# Patient Record
Sex: FEMALE | Race: WHITE | NOT HISPANIC OR LATINO | Employment: FULL TIME | URBAN - METROPOLITAN AREA
[De-identification: names, ages, dates, MRNs, and addresses within clinical notes are randomized per-mention and may not be internally consistent; named-entity substitution may affect disease eponyms.]

---

## 2017-01-27 ENCOUNTER — OFFICE VISIT (OUTPATIENT)
Dept: MEDICAL GROUP | Facility: MEDICAL CENTER | Age: 62
End: 2017-01-27
Payer: COMMERCIAL

## 2017-01-27 VITALS
BODY MASS INDEX: 31.77 KG/M2 | OXYGEN SATURATION: 95 % | TEMPERATURE: 97.3 F | DIASTOLIC BLOOD PRESSURE: 102 MMHG | HEART RATE: 80 BPM | HEIGHT: 66 IN | RESPIRATION RATE: 12 BRPM | WEIGHT: 197.7 LBS | SYSTOLIC BLOOD PRESSURE: 138 MMHG

## 2017-01-27 DIAGNOSIS — E78.49 OTHER HYPERLIPIDEMIA: ICD-10-CM

## 2017-01-27 DIAGNOSIS — Z01.419 ENCOUNTER FOR GYNECOLOGICAL EXAMINATION: ICD-10-CM

## 2017-01-27 DIAGNOSIS — I10 ESSENTIAL HYPERTENSION: ICD-10-CM

## 2017-01-27 DIAGNOSIS — J06.9 ACUTE URI: ICD-10-CM

## 2017-01-27 DIAGNOSIS — R73.9 HYPERGLYCEMIA: ICD-10-CM

## 2017-01-27 DIAGNOSIS — Z23 NEED FOR SHINGLES VACCINE: ICD-10-CM

## 2017-01-27 DIAGNOSIS — M85.80 OSTEOPENIA: ICD-10-CM

## 2017-01-27 DIAGNOSIS — Z23 NEED FOR INFLUENZA VACCINATION: ICD-10-CM

## 2017-01-27 DIAGNOSIS — D22.9 ATYPICAL NEVI: ICD-10-CM

## 2017-01-27 DIAGNOSIS — Z00.00 PREVENTATIVE HEALTH CARE: ICD-10-CM

## 2017-01-27 DIAGNOSIS — M79.671 RIGHT FOOT PAIN: ICD-10-CM

## 2017-01-27 DIAGNOSIS — E03.1 CONGENITAL HYPOTHYROIDISM: ICD-10-CM

## 2017-01-27 PROCEDURE — 90471 IMMUNIZATION ADMIN: CPT | Performed by: NURSE PRACTITIONER

## 2017-01-27 PROCEDURE — 99396 PREV VISIT EST AGE 40-64: CPT | Mod: 25 | Performed by: NURSE PRACTITIONER

## 2017-01-27 PROCEDURE — 90686 IIV4 VACC NO PRSV 0.5 ML IM: CPT | Performed by: NURSE PRACTITIONER

## 2017-01-27 PROCEDURE — 90736 HZV VACCINE LIVE SUBQ: CPT | Performed by: NURSE PRACTITIONER

## 2017-01-27 PROCEDURE — 90472 IMMUNIZATION ADMIN EACH ADD: CPT | Performed by: NURSE PRACTITIONER

## 2017-01-27 RX ORDER — PROMETHAZINE HYDROCHLORIDE AND CODEINE PHOSPHATE 6.25; 1 MG/5ML; MG/5ML
5-10 SYRUP ORAL NIGHTLY PRN
Qty: 240 ML | Refills: 0 | Status: SHIPPED
Start: 2017-01-27 | End: 2017-09-25

## 2017-01-27 RX ORDER — LISINOPRIL 5 MG/1
5 TABLET ORAL DAILY
Qty: 90 TAB | Refills: 3 | Status: SHIPPED | OUTPATIENT
Start: 2017-01-27 | End: 2018-03-01 | Stop reason: SDUPTHER

## 2017-01-27 RX ORDER — LEVOTHYROXINE SODIUM 112 UG/1
TABLET ORAL
Qty: 90 TAB | Refills: 3 | Status: SHIPPED | OUTPATIENT
Start: 2017-01-27 | End: 2018-04-27 | Stop reason: SDUPTHER

## 2017-01-27 RX ORDER — ROSUVASTATIN CALCIUM 40 MG/1
40 TABLET, COATED ORAL DAILY
Qty: 90 TAB | Refills: 3 | Status: SHIPPED | OUTPATIENT
Start: 2017-01-27 | End: 2018-02-09 | Stop reason: SDUPTHER

## 2017-01-27 ASSESSMENT — PATIENT HEALTH QUESTIONNAIRE - PHQ9: CLINICAL INTERPRETATION OF PHQ2 SCORE: 0

## 2017-01-27 NOTE — PROGRESS NOTES
Chief Complaint   Patient presents with   • Annual Exam     physical & possible pap       HPI:  Christa is a 61 y.o.  female who presents for annual exam. She is a few concerns such as new onset right foot pain for the past 6 months without trauma. She like an x-ray which was suggested by her chiropractor. 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 Her pain is typically with a lot of use of her right foot.  She has osteopenia, borderline osteoporosis with her last bone density showing a T score of -2.4 in her spine area Has had 2 prolia injections - last was Dec 2016 - pt has to pay a fortune for this and would like to discuss other options. She is allergic to bisphosphonates, she had temporary paralysis in her right leg after taking an oral bisphosphonate.   Her hypertension sent well controlled and she needs refills of medication, she states her blood pressure is always high when she is in here. Blood pressures been around 130/80 at home. She is due for blood work. She reports that she's been exercising and avoiding white carbohydrates, she does have type 2 diabetes and is taking metformin. She has been refusing to check her blood sugars at home.  Regarding her health maintenance:   Last pap: 2 years ago, she had a partial hysterectomy multiple years ago and cannot recall if she has a cervix  Abnormal Pap hx: No   Periods: None, hysterectomy patient  Last Mammo: Done 2 months ago and normal  Last colonoscopy: This is up-to-date.  Bone density test: She is due for this  Last Lab: due for follow up   Last Td:current   Influenza vaccination: She is due for this  Pneumococcal vaccination:not applicable   Zostavax: She is due for this  Hx STD''s: no   Regular exercise: yes      meds:   Current Outpatient Prescriptions   Medication Sig Dispense Refill   • metformin (GLUCOPHAGE) 500 MG Tab TAKE ONE TABLET BY MOUTH ONCE DAILY AT BEDTIME WITH DINNER. OK TO TAKE 0.5 TAB IF YOU HAVE STOMACH ISSUES 90 Tab  0   • ciprofloxacin (CIPRO) 500 MG Tab TAKE ONE TABLET BY MOUTH TWICE DAILY 20 Tab 0   • hydrocortisone 2.5 % Cream topical cream APPLY CREAM TOPICALLY TWICE DAILY AS NEEDED 1 Tube 0   • lisinopril (PRINIVIL) 5 MG Tab Take 1 Tab by mouth every day. 30 Tab 11   • cyclobenzaprine (FLEXERIL) 10 MG Tab Take 1 Tab by mouth 3 times a day as needed. 30 Tab 0   • albuterol (VENTOLIN OR PROVENTIL) 108 (90 BASE) MCG/ACT Aero Soln inhalation aerosol Inhale 2 Puffs by mouth every 6 hours as needed for Shortness of Breath. 8.5 g 3   • levothyroxine (SYNTHROID) 112 MCG Tab TAKE ONE TABLET BY MOUTH ONCE DAILY 90 Tab 3   • rosuvastatin (CRESTOR) 40 MG tablet Take 1 Tab by mouth every day. 30 Tab 11   • estradiol (ESTRACE) 0.5 MG tablet Take 1 Tab by mouth every day. 90 Tab 3   • hydrocortisone rectal (PROCTOCREAM-HC) 2.5 % CREA Apply bid prn 1 Tube 3   • aspirin buffered (ASCRIPTIN) 325 MG TABS Take 2 Tabs by mouth every four hours as needed.       No current facility-administered medications for this visit.       Allergies: No Known Allergies    family:   Family History   Problem Relation Age of Onset   • Heart Disease Mother    • Hypertension Mother    • Hyperlipidemia Sister    • Hyperlipidemia Brother    • Heart Disease Brother    • Heart Disease Maternal Grandmother    • Hyperlipidemia Maternal Grandmother        social hx:   Social History     Social History   • Marital Status: Single     Spouse Name: N/A   • Number of Children: N/A   • Years of Education: N/A     Occupational History   • Not on file.     Social History Main Topics   • Smoking status: Never Smoker    • Smokeless tobacco: Never Used   • Alcohol Use: No   • Drug Use: No   • Sexual Activity: Not on file      Comment: , 4 children, teacher @ [employer]     Other Topics Concern   • Not on file     Social History Narrative       ROS:  No fever, chills, sweats.   No polydipsia, polyuria, temperature intolerance, significant weight changes   No visual changes,  "blurred vision.  No chest pain, palpitations, peripheral swelling   No chronic cough, shortness of breath, dyspnea with exertion.   No dysphagia, odynophagia, black or bloody stools.   No abdominal pain, nausea, persistent diarrhea, constipation   No dysuria, hematuria, incontinence. Denies nocturia  No rash, pruritis, pigment changes.   No focal weakness, syncope, headache, confusion, persistent numbness.   All other systems are reviewed and negative.    PHYSICAL EXAMINATION:  Blood pressure 138/102, pulse 80, temperature 36.3 °C (97.3 °F), resp. rate 12, height 1.676 m (5' 6\"), weight 89.676 kg (197 lb 11.2 oz), SpO2 95 %.  General appearance: Overweight female, healthy, well developed, well nourished  Psych: alert, no distress, cooperative  Eyes: EOM's normal, pupils equal, round, reactive to light  ENT: Ears: external ears normal to inspection and palpation, TM's clear, Nose/Sinuses: nose shows no deformity, asymmetry, or inflammation  Neck: no asymmetry, masses, or scars, no adenopathy, thyroid normal to palpation  Lungs:chest symmetric with normal A/P diameter, no chest deformities noted, normal respiratory rate and rhythm  Cardiovascular:regular rate and rhythm, S1 normal  Breasts: normal in size and symmetry, skin normal, physiologic fibronodularity  Abdomen: umbilicus normal, no masses palpable, no organomegaly  Musculoskeletal:ROM of all joints is normal, no evidence of joint instability. Right foot: She does have some tenderness to her fifth distal metatarsal and in between her first and second metatarsal area  Lymphatic: None significantly enlarged  Skin: no rash, no edema.  5 x 7 mm raised irritated / scabbed nevi to right lower cheek just lateral ear.  Neuro: mental status intact, cranial nerves 2-12 intact  Pelvic: external genitalia normal, cervix and uterus are surgically absent, adnexa without masses or tenderness, vaginal mucosa normal      ASSESSMENT/PLAN:  1.annual physical exam: HCM:  Pap and " breast exams done.  BSE technique reviewed and patient encouraged to perform self-exam monthly.   Encourage monthly self breast exam  Encourage daily exercise for at least 30 minutes  Mammogram and colonoscopy are up-to-date  Recommend 1500 mg Calcium with 600 units vit d daily.    Follow up one year for annual PAP  Recommend CBC, CMP, TSH, LP, A1c- fasting and a follow-up here to review labs  2. Osteopenia: Consider Evista. Recheck DEXA prior to initiating any other therapy. Discussed the importance of weightbearing exercise and avoidance of caffeine.  3.  Right foot pain: Check x-ray, suspect arthritis.  4.  Hypertension: Elevated here. Reports that home monitoring has been good. Follow-up for numbers consistently greater than 140/90

## 2017-01-27 NOTE — MR AVS SNAPSHOT
"        Christa Ramirez   2017 3:20 PM   Office Visit   MRN: [id number]    Department:  [address]   Dept Phone:  [phone]    Description:  Female : 1955   Provider:  SABINO Mchugh           Reason for Visit     Annual Exam physical & possible pap      Allergies as of 2017     Allergen Noted Reactions    Boniva [Ibandronic Acid] 2016       \"right side numbness\"      You were diagnosed with     Encounter for gynecological examination   [5352367]       Atypical nevi   [366526]       Osteopenia   [653120]       Need for shingles vaccine   [332456]       Right foot pain   [973465]       Essential hypertension   [0197140]       Other hyperlipidemia   [1789072]       Congenital hypothyroidism   [243.ICD-9-CM]       Hyperglycemia   [458678]       Preventative health care   [428315]       Acute URI   [026965]       Need for influenza vaccination   [947584]         Vital Signs     Blood Pressure Pulse Temperature Respirations Height Weight    138/102 mmHg 80 36.3 °C (97.3 °F) 12 1.676 m (5' 6\") 89.676 kg (197 lb 11.2 oz)    Body Mass Index Oxygen Saturation Smoking Status             31.92 kg/m2 95% Never Smoker          Basic Information     Date Of Birth Sex Race Ethnicity Preferred Language    1955 Female Unknown Unknown English      Problem List              ICD-10-CM Priority Class Noted - Resolved    Hypertension I10   Unknown - Present    Hyperlipidemia E78.5   Unknown - Present    Frequent UTI N39.0   Unknown - Present    Hemorrhoid K64.9   Unknown - Present    Hypothyroid E03.9   2012 - Present    Hyperglycemia R73.9   2012 - Present    Obesity E66.9   2012 - Present    Thyroid nodule E04.1   2013 - Present    Osteopenia M85.80   2016 - Present      Health Maintenance        Date Due Completion Dates    IMM HEP B VACCINE (1 of 3 - Primary Series) 1955 ---    DIABETES MONOFILAMENT / LE EXAM 1955 ---    RETINAL " SCREENING 5/29/1974 ---    URINE ACR / MICROALBUMIN 5/29/1974 ---    IMM ZOSTER VACCINE 5/29/2016 ---    IMM INFLUENZA (1) 9/1/2016 12/23/2015, 12/22/2014, 11/26/2013, 10/9/2012    A1C SCREENING 10/29/2016 4/29/2016, 1/15/2016    FASTING LIPID PROFILE 1/15/2017 1/15/2016, 12/29/2014, 12/5/2013, 11/12/2012, 8/22/2011, 10/17/2009    SERUM CREATININE 1/15/2017 1/15/2016, 12/29/2014, 12/5/2013, 11/12/2012, 8/22/2011    COLONOSCOPY 9/11/2017 9/11/2007 (N/S)    Override on 9/11/2007: (N/S)    MAMMOGRAM 11/30/2017 11/30/2016, 1/15/2015, 1/9/2015, 12/20/2013, 12/14/2012, 11/23/2011, 10/1/2010, 7/28/2009, 7/28/2009, 7/10/2008, 7/10/2008, 6/26/2007, 6/26/2007, 4/26/2006    PAP SMEAR 12/23/2018 12/23/2015, 12/22/2014, 10/9/2012, 7/27/2009    IMM DTaP/Tdap/Td Vaccine (2 - Td) 11/26/2023 11/26/2013            Current Immunizations     Influenza TIV (IM) 11/26/2013, 10/9/2012 12:58 PM    Influenza Vaccine Quad Inj (Pf) 1/27/2017  4:02 PM, 12/23/2015 10:28 AM    Influenza Vaccine Quad Inj (Preserved) 12/22/2014  9:07 AM    SHINGLES VACCINE 1/27/2017  4:01 PM    Tdap Vaccine 11/26/2013      Below and/or attached are the medications your provider expects you to take. Review all of your home medications and newly ordered medications with your provider and/or pharmacist. Follow medication instructions as directed by your provider and/or pharmacist. Please keep your medication list with you and share with your provider. Update the information when medications are discontinued, doses are changed, or new medications (including over-the-counter products) are added; and carry medication information at all times in the event of emergency situations     Allergies:  BONIVA - (reactions not documented)               Medications  Valid as of: January 27, 2017 -  4:48 PM    Generic Name Brand Name Tablet Size Instructions for use    Albuterol Sulfate (Aero Soln) albuterol 108 (90 BASE) MCG/ACT Inhale 2 Puffs by mouth every 6 hours as needed for  Shortness of Breath.        Aspirin Buf(IrUoz-WrEps-BmIml) (Tab) ASCRIPTIN 325 MG Take 2 Tabs by mouth every four hours as needed.        Ciprofloxacin HCl (Tab) CIPRO 500 MG TAKE ONE TABLET BY MOUTH TWICE DAILY        Cyclobenzaprine HCl (Tab) FLEXERIL 10 MG Take 1 Tab by mouth 3 times a day as needed.        Estradiol (Tab) ESTRACE 0.5 MG Take 1 Tab by mouth every day.        Hydrocortisone (Cream) PROCTOZONE HC 2.5 % Apply bid prn        Hydrocortisone (Cream) hydrocortisone 2.5 % APPLY CREAM TOPICALLY TWICE DAILY AS NEEDED        Levothyroxine Sodium (Tab) SYNTHROID 112 MCG TAKE ONE TABLET BY MOUTH ONCE DAILY        Lisinopril (Tab) PRINIVIL 5 MG Take 1 Tab by mouth every day.        MetFORMIN HCl (Tab) GLUCOPHAGE 500 MG TAKE ONE TABLET BY MOUTH ONCE DAILY AT BEDTIME WITH DINNER. OK TO TAKE 0.5 TAB IF YOU HAVE STOMACH ISSUES        Promethazine-Codeine (Syrup) PHENERGAN-CODEINE 6.25-10 MG/5ML Take 5-10 mL by mouth at bedtime as needed for Cough.        Rosuvastatin Calcium (Tab) CRESTOR 40 MG Take 1 Tab by mouth every day.        .                 Medicines prescribed today were sent to:     [facility] [address] [place] [address] (), [place] - [id number] () [place] [address]    Phone: [phone] Fax: [phone]    Open 24 Hours?: No      Medication refill instructions:       If your prescription bottle indicates you have medication refills left, it is not necessary to call your provider’s office. Please contact your pharmacy and they will refill your medication.    If your prescription bottle indicates you do not have any refills left, you may request refills at any time through one of the following ways: The online [employer] system (except Urgent Care), by calling your provider’s office, or by asking your pharmacy to contact your provider’s office with a refill request. Medication refills are processed only during regular business hours and may not be available until the next business day.  Your provider may request additional information or to have a follow-up visit with you prior to refilling your medication.   *Please Note: Medication refills are assigned a new Rx number when refilled electronically. Your pharmacy may indicate that no refills were authorized even though a new prescription for the same medication is available at the pharmacy. Please request the medicine by name with the pharmacy before contacting your provider for a refill.        Your To Do List     Future Labs/Procedures Complete By Expires    CBC WITH DIFFERENTIAL  As directed 1/28/2018    COMP METABOLIC PANEL  As directed 1/28/2018    DS-BONE DENSITY STUDY (DEXA)  As directed 1/27/2018    DX-FOOT-COMPLETE 3+ RIGHT  As directed 7/30/2017    FREE THYROXINE  As directed 7/28/2017    HEMOGLOBIN A1C  As directed 1/28/2018    LIPID PROFILE  As directed 1/28/2018    TSH  As directed 1/28/2018      Referral     A referral request has been sent to our patient care coordination department. Please allow 3-5 business days for us to process this request and contact you either by phone or mail. If you do not hear from us by the 5th business day, please call us at [phone].           [facility] Access Code: Activation code not generated  Current [facility] Status: Active

## 2017-02-04 ENCOUNTER — HOSPITAL ENCOUNTER (OUTPATIENT)
Dept: LAB | Facility: MEDICAL CENTER | Age: 62
End: 2017-02-04
Attending: NURSE PRACTITIONER
Payer: COMMERCIAL

## 2017-02-04 DIAGNOSIS — Z00.00 PREVENTATIVE HEALTH CARE: ICD-10-CM

## 2017-02-04 LAB
ALBUMIN SERPL BCP-MCNC: 4.4 G/DL (ref 3.2–4.9)
ALBUMIN/GLOB SERPL: 1.6 G/DL
ALP SERPL-CCNC: 56 U/L (ref 30–99)
ALT SERPL-CCNC: 24 U/L (ref 2–50)
ANION GAP SERPL CALC-SCNC: 8 MMOL/L (ref 0–11.9)
AST SERPL-CCNC: 26 U/L (ref 12–45)
BASOPHILS # BLD AUTO: 0.04 K/UL (ref 0–0.12)
BASOPHILS NFR BLD AUTO: 0.7 % (ref 0–1.8)
BILIRUB SERPL-MCNC: 0.4 MG/DL (ref 0.1–1.5)
BUN SERPL-MCNC: 17 MG/DL (ref 8–22)
CALCIUM SERPL-MCNC: 9.8 MG/DL (ref 8.5–10.5)
CHLORIDE SERPL-SCNC: 105 MMOL/L (ref 96–112)
CHOLEST SERPL-MCNC: 154 MG/DL (ref 100–199)
CO2 SERPL-SCNC: 26 MMOL/L (ref 20–33)
CREAT SERPL-MCNC: 0.67 MG/DL (ref 0.5–1.4)
EOSINOPHIL # BLD: 0.18 K/UL (ref 0–0.51)
EOSINOPHIL NFR BLD AUTO: 3 % (ref 0–6.9)
ERYTHROCYTE [DISTWIDTH] IN BLOOD BY AUTOMATED COUNT: 48.8 FL (ref 35.9–50)
EST. AVERAGE GLUCOSE BLD GHB EST-MCNC: 131 MG/DL
GLOBULIN SER CALC-MCNC: 2.8 G/DL (ref 1.9–3.5)
GLUCOSE SERPL-MCNC: 100 MG/DL (ref 65–99)
HBA1C MFR BLD: 6.2 % (ref 0–5.6)
HCT VFR BLD AUTO: 46 % (ref 37–47)
HDLC SERPL-MCNC: 48 MG/DL
HGB BLD-MCNC: 14.6 G/DL (ref 12–16)
IMM GRANULOCYTES # BLD AUTO: 0.01 K/UL (ref 0–0.11)
IMM GRANULOCYTES NFR BLD AUTO: 0.2 % (ref 0–0.9)
LDLC SERPL CALC-MCNC: 86 MG/DL
LYMPHOCYTES # BLD: 1.56 K/UL (ref 1–4.8)
LYMPHOCYTES NFR BLD AUTO: 26 % (ref 22–41)
MCH RBC QN AUTO: 27.7 PG (ref 27–33)
MCHC RBC AUTO-ENTMCNC: 31.7 G/DL (ref 33.6–35)
MCV RBC AUTO: 87.1 FL (ref 81.4–97.8)
MONOCYTES # BLD: 0.41 K/UL (ref 0–0.85)
MONOCYTES NFR BLD AUTO: 6.8 % (ref 0–13.4)
NEUTROPHILS # BLD: 3.81 K/UL (ref 2–7.15)
NEUTROPHILS NFR BLD AUTO: 63.3 % (ref 44–72)
NRBC # BLD AUTO: 0 K/UL
NRBC BLD-RTO: 0 /100 WBC
PLATELET # BLD AUTO: 335 K/UL (ref 164–446)
PMV BLD AUTO: 8.9 FL (ref 9–12.9)
POTASSIUM SERPL-SCNC: 4.3 MMOL/L (ref 3.6–5.5)
PROT SERPL-MCNC: 7.2 G/DL (ref 6–8.2)
RBC # BLD AUTO: 5.28 M/UL (ref 4.2–5.4)
SODIUM SERPL-SCNC: 139 MMOL/L (ref 135–145)
T4 FREE SERPL-MCNC: 1.01 NG/DL (ref 0.53–1.43)
TRIGL SERPL-MCNC: 101 MG/DL (ref 0–149)
TSH SERPL DL<=0.005 MIU/L-ACNC: 0.7 UIU/ML (ref 0.3–3.7)
WBC # BLD AUTO: 6 K/UL (ref 4.8–10.8)

## 2017-02-04 PROCEDURE — 80061 LIPID PANEL: CPT

## 2017-02-04 PROCEDURE — 84443 ASSAY THYROID STIM HORMONE: CPT

## 2017-02-04 PROCEDURE — 84439 ASSAY OF FREE THYROXINE: CPT

## 2017-02-04 PROCEDURE — 80053 COMPREHEN METABOLIC PANEL: CPT

## 2017-02-04 PROCEDURE — 36415 COLL VENOUS BLD VENIPUNCTURE: CPT

## 2017-02-04 PROCEDURE — 85025 COMPLETE CBC W/AUTO DIFF WBC: CPT

## 2017-02-04 PROCEDURE — 83036 HEMOGLOBIN GLYCOSYLATED A1C: CPT

## 2017-02-13 DIAGNOSIS — J02.9 ACUTE PHARYNGITIS, UNSPECIFIED ETIOLOGY: ICD-10-CM

## 2017-02-13 RX ORDER — AZITHROMYCIN 250 MG/1
TABLET, FILM COATED ORAL
Qty: 6 TAB | Refills: 0 | Status: SHIPPED | OUTPATIENT
Start: 2017-02-13 | End: 2017-09-25

## 2017-02-16 ENCOUNTER — HOSPITAL ENCOUNTER (OUTPATIENT)
Dept: RADIOLOGY | Facility: MEDICAL CENTER | Age: 62
End: 2017-02-16
Attending: NURSE PRACTITIONER
Payer: COMMERCIAL

## 2017-02-16 DIAGNOSIS — M79.671 RIGHT FOOT PAIN: ICD-10-CM

## 2017-02-16 DIAGNOSIS — M85.80 OSTEOPENIA: ICD-10-CM

## 2017-02-16 PROCEDURE — 77080 DXA BONE DENSITY AXIAL: CPT

## 2017-02-16 PROCEDURE — 73630 X-RAY EXAM OF FOOT: CPT | Mod: RT

## 2017-02-17 DIAGNOSIS — M85.80 OSTEOPENIA: ICD-10-CM

## 2017-02-17 DIAGNOSIS — M79.671 RIGHT FOOT PAIN: ICD-10-CM

## 2017-02-17 RX ORDER — RALOXIFENE HYDROCHLORIDE 60 MG/1
60 TABLET, FILM COATED ORAL DAILY
Qty: 30 TAB | Refills: 11 | Status: SHIPPED | OUTPATIENT
Start: 2017-02-17 | End: 2017-07-11 | Stop reason: SDUPTHER

## 2017-02-28 ENCOUNTER — OFFICE VISIT (OUTPATIENT)
Dept: PLASTIC SURGERY | Facility: IMAGING CENTER | Age: 62
End: 2017-02-28
Payer: COMMERCIAL

## 2017-02-28 ENCOUNTER — HOSPITAL ENCOUNTER (OUTPATIENT)
Facility: MEDICAL CENTER | Age: 62
End: 2017-02-28
Attending: NURSE PRACTITIONER
Payer: COMMERCIAL

## 2017-02-28 DIAGNOSIS — D48.5 NEOPLASM OF UNCERTAIN BEHAVIOR OF SKIN: ICD-10-CM

## 2017-02-28 LAB — PATHOLOGY CONSULT NOTE: NORMAL

## 2017-02-28 PROCEDURE — 99201 PR OFFICE/OUTPT VISIT,NEW,LEVL I: CPT | Mod: 25 | Performed by: NURSE PRACTITIONER

## 2017-02-28 PROCEDURE — 11100 PR BIOPSY OF SKIN LESION: CPT | Performed by: NURSE PRACTITIONER

## 2017-02-28 PROCEDURE — 88305 TISSUE EXAM BY PATHOLOGIST: CPT

## 2017-02-28 NOTE — ASSESSMENT & PLAN NOTE
She has had a skin lesion on the right side of her jaw since December . She states it won't  stop bleeding, she has to keep a bandage on it all times or it will bleed on her clothing.

## 2017-02-28 NOTE — MR AVS SNAPSHOT
"        Christa Ramirez   2017 3:30 PM   Office Visit   MRN: [id number]    Department:  [facility]   Dept Phone:  [phone]    Description:  Female : 1955   Provider:  SABINO Galvan           Reason for Visit     Skin Lesion           Allergies as of 2017     Allergen Noted Reactions    Boniva [Ibandronic Acid] 2016       \"right side numbness\"      You were diagnosed with     Neoplasm of uncertain behavior of skin   [238.2.ICD-9-CM]         Vital Signs     Smoking Status                   Never Smoker            Basic Information     Date Of Birth Sex Race Ethnicity Preferred Language    1955 Female Unknown Non- English      Problem List              ICD-10-CM Priority Class Noted - Resolved    Hypertension I10   Unknown - Present    Hyperlipidemia E78.5   Unknown - Present    Frequent UTI N39.0   Unknown - Present    Hemorrhoid K64.9   Unknown - Present    Hypothyroid E03.9   2012 - Present    Hyperglycemia R73.9   2012 - Present    Obesity E66.9   2012 - Present    Thyroid nodule E04.1   2013 - Present    Osteopenia M85.80   2016 - Present    Neoplasm of uncertain behavior of skin D48.5   2017 - Present      Health Maintenance        Date Due Completion Dates    IMM HEP B VACCINE (1 of 3 - Primary Series) 1955 ---    DIABETES MONOFILAMENT / LE EXAM 1955 ---    RETINAL SCREENING 1974 ---    URINE ACR / MICROALBUMIN 1974 ---    A1C SCREENING 2017, 2016, 1/15/2016    COLONOSCOPY 2017 (N/S)    Override on 2007: (N/S)    MAMMOGRAM 2017, 1/15/2015, 2015, 2013, 2012, 2011, 10/1/2010, 2009, 2009, 7/10/2008, 7/10/2008, 2007, 2007, 2006    FASTING LIPID PROFILE 2018, 1/15/2016, 2014, 2013, 2012, 2011, 10/17/2009    SERUM CREATININE 2018, 1/15/2016, " 12/29/2014, 12/5/2013, 11/12/2012, 8/22/2011    PAP SMEAR 12/23/2018 12/23/2015, 12/22/2014, 10/9/2012, 7/27/2009    IMM DTaP/Tdap/Td Vaccine (2 - Td) 11/26/2023 11/26/2013            Current Immunizations     Influenza TIV (IM) 11/26/2013, 10/9/2012 12:58 PM    Influenza Vaccine Quad Inj (Pf) 1/27/2017  4:02 PM, 12/23/2015 10:28 AM    Influenza Vaccine Quad Inj (Preserved) 12/22/2014  9:07 AM    SHINGLES VACCINE 1/27/2017  4:01 PM    Tdap Vaccine 11/26/2013      Below and/or attached are the medications your provider expects you to take. Review all of your home medications and newly ordered medications with your provider and/or pharmacist. Follow medication instructions as directed by your provider and/or pharmacist. Please keep your medication list with you and share with your provider. Update the information when medications are discontinued, doses are changed, or new medications (including over-the-counter products) are added; and carry medication information at all times in the event of emergency situations     Allergies:  BONIVA - (reactions not documented)               Medications  Valid as of: March 07, 2017 -  3:01 PM    Generic Name Brand Name Tablet Size Instructions for use    Albuterol Sulfate (Aero Soln) albuterol 108 (90 BASE) MCG/ACT Inhale 2 Puffs by mouth every 6 hours as needed for Shortness of Breath.        Aspirin Buf(VvEjg-RiViv-QdVgh) (Tab) ASCRIPTIN 325 MG Take 2 Tabs by mouth every four hours as needed.        Azithromycin (Tab) ZITHROMAX 250  mg day one, 250 mg day 2-5        Ciprofloxacin HCl (Tab) CIPRO 500 MG TAKE ONE TABLET BY MOUTH TWICE DAILY        Cyclobenzaprine HCl (Tab) FLEXERIL 10 MG Take 1 Tab by mouth 3 times a day as needed.        Estradiol (Tab) ESTRACE 0.5 MG Take 1 Tab by mouth every day.        Hydrocortisone (Cream) PROCTOZONE HC 2.5 % Apply bid prn        Hydrocortisone (Cream) hydrocortisone 2.5 % APPLY CREAM TOPICALLY TO AFFECTED AREA(S) TWICE DAILY AS NEEDED         Levothyroxine Sodium (Tab) SYNTHROID 112 MCG TAKE ONE TABLET BY MOUTH ONCE DAILY        Lisinopril (Tab) PRINIVIL 5 MG Take 1 Tab by mouth every day.        MetFORMIN HCl (Tab) GLUCOPHAGE 500 MG TAKE ONE TABLET BY MOUTH ONCE DAILY AT BEDTIME WITH DINNER. OK TO TAKE 0.5 TAB IF YOU HAVE STOMACH ISSUES        Promethazine-Codeine (Syrup) PHENERGAN-CODEINE 6.25-10 MG/5ML Take 5-10 mL by mouth at bedtime as needed for Cough.        Raloxifene HCl (Tab) EVISTA 60 MG Take 1 Tab by mouth every day.        Rosuvastatin Calcium (Tab) CRESTOR 40 MG Take 1 Tab by mouth every day.        .                 Medicines prescribed today were sent to:     [facility] [address] [place] [address] (), [place] - [address]    [address] () [place] [address]    Phone: [phone] Fax: [phone]    Open 24 Hours?: No      Medication refill instructions:       If your prescription bottle indicates you have medication refills left, it is not necessary to call your provider’s office. Please contact your pharmacy and they will refill your medication.    If your prescription bottle indicates you do not have any refills left, you may request refills at any time through one of the following ways: The online [employer] system (except Urgent Care), by calling your provider’s office, or by asking your pharmacy to contact your provider’s office with a refill request. Medication refills are processed only during regular business hours and may not be available until the next business day. Your provider may request additional information or to have a follow-up visit with you prior to refilling your medication.   *Please Note: Medication refills are assigned a new Rx number when refilled electronically. Your pharmacy may indicate that no refills were authorized even though a new prescription for the same medication is available at the pharmacy. Please request the medicine by name with the pharmacy before contacting your provider for a  refill.           MyChart Access Code: Activation code not generated  Current [employer] Status: Active

## 2017-03-01 NOTE — PROGRESS NOTES
Chief Complaint   Patient presents with   • Skin Lesion         HISTORY OF THE PRESENT ILLNESS: Patient is a 61 y.o. Female patient of Aundrea Pat. This pleasant patient is here today regarding a skin lesion on the right side of her face at the jaw line. I      Neoplasm of uncertain behavior of skin  She has had a skin lesion on the right side of her jaw that has been bleeding since December . It was a round raised lesion prior to that, she scraped it and then it started bleeding. She states it won't  stop bleeding, she has to keep a bandage on it all times or it will bleed on her clothing.        Allergies: Boniva    Current Outpatient Prescriptions   Medication Sig Dispense Refill   • hydrocortisone 2.5 % Cream topical cream APPLY CREAM TOPICALLY TO AFFECTED AREA(S) TWICE DAILY AS NEEDED 28 g 0   • raloxifene (EVISTA) 60 MG Tab Take 1 Tab by mouth every day. 30 Tab 11   • azithromycin (ZITHROMAX) 250 MG Tab 500 mg day one, 250 mg day 2-5 6 Tab 0   • lisinopril (PRINIVIL) 5 MG Tab Take 1 Tab by mouth every day. 90 Tab 3   • rosuvastatin (CRESTOR) 40 MG tablet Take 1 Tab by mouth every day. 90 Tab 3   • levothyroxine (SYNTHROID) 112 MCG Tab TAKE ONE TABLET BY MOUTH ONCE DAILY 90 Tab 3   • promethazine-codeine (PHENERGAN-CODEINE) 6.25-10 MG/5ML Syrup Take 5-10 mL by mouth at bedtime as needed for Cough. 240 mL 0   • metformin (GLUCOPHAGE) 500 MG Tab TAKE ONE TABLET BY MOUTH ONCE DAILY AT BEDTIME WITH DINNER. OK TO TAKE 0.5 TAB IF YOU HAVE STOMACH ISSUES 90 Tab 0   • ciprofloxacin (CIPRO) 500 MG Tab TAKE ONE TABLET BY MOUTH TWICE DAILY 20 Tab 0   • cyclobenzaprine (FLEXERIL) 10 MG Tab Take 1 Tab by mouth 3 times a day as needed. 30 Tab 0   • albuterol (VENTOLIN OR PROVENTIL) 108 (90 BASE) MCG/ACT Aero Soln inhalation aerosol Inhale 2 Puffs by mouth every 6 hours as needed for Shortness of Breath. 8.5 g 3   • estradiol (ESTRACE) 0.5 MG tablet Take 1 Tab by mouth every day. 90 Tab 3   • hydrocortisone rectal  (PROCTOCREAM-HC) 2.5 % CREA Apply bid prn 1 Tube 3   • aspirin buffered (ASCRIPTIN) 325 MG TABS Take 2 Tabs by mouth every four hours as needed.       No current facility-administered medications for this visit.       Past Medical History   Diagnosis Date   • Hypertension    • Hyperlipidemia    • Frequent UTI    • HEMORRHOIDS    • OSTEOPOROSIS    • Neoplasm of uncertain behavior of skin 2017       Past Surgical History   Procedure Laterality Date   • Bladder suspension     • Colonoscopy       recheck 5 years   • Abdominal hysterectomy total         Social History   Substance Use Topics   • Smoking status: Never Smoker    • Smokeless tobacco: Never Used   • Alcohol Use: No       Family Status   Relation Status Death Age   • Mother       osteoporosis   • Sister Alive    • Brother     • Father       Family History   Problem Relation Age of Onset   • Heart Disease Mother    • Hypertension Mother    • Hyperlipidemia Sister    • Hyperlipidemia Brother    • Heart Disease Brother    • Heart Disease Maternal Grandmother    • Hyperlipidemia Maternal Grandmother        Review of Systems   Constitutional: Negative for fever, chills, weight loss and malaise/fatigue.   Skin:skin lesion on her face bleeding since December    Exam: There were no vitals taken for this visit.  General: Normal appearing. No distress.  Skin: five mm open bleeding lesion at the jaw line     Consent is signed     Preoperative diagnosis: Skin neoplasm of uncertain behavior of the skin  Postoperative diagnosis: Skin neoplasm of uncertain behavior of the skin            Anesthesia: Half to 1 cc of lidocaine with epinephrine    EBL: Less than 2 cc    Complications: None    Procedure: After informed consent patient was placed on the table supine. An area 2 cm superior lateral to the skin neoplasm was swabbed with CHlora prep due to her stated allergy to iodine Using a 27-gauge in 1-1/2 inch needle local anesthesia was obtained  with half cc of lidocaine with epinephrine.  Using a #5 punch, a punch biopsy was performed, lesion wassent to pathology. Two # 3 prolene sutures were placed.  Patient tolerated the procedure well . Antibacterial ointment and a sterile dressing was applied, patient was instructed on wound care. Patient was instructed on signs and symptoms of infection. She will call the office if these occur.  Please note that this dictation was created using voice recognition software. I have made every reasonable attempt to correct obvious errors, but I expect that there are errors of grammar and possibly content that I did not discover before finalizing the note.      Assessment/Plan  1. Neoplasm of uncertain behavior of skin  Further treatment based on pathology results

## 2017-03-07 ENCOUNTER — OFFICE VISIT (OUTPATIENT)
Dept: PLASTIC SURGERY | Facility: IMAGING CENTER | Age: 62
End: 2017-03-07
Payer: COMMERCIAL

## 2017-03-07 DIAGNOSIS — C44.310 BASAL CELL CARCINOMA OF FACE: ICD-10-CM

## 2017-03-07 NOTE — MR AVS SNAPSHOT
"        Christa Ramirez   3/7/2017 3:00 PM   Office Visit   MRN: [id number]    Department:  [facility]   Dept Phone:  [phone]    Description:  Female : 1955   Provider:  SABINO Galvan           Reason for Visit     Suture / Staple Removal           Allergies as of 3/7/2017     Allergen Noted Reactions    Boniva [Ibandronic Acid] 2016       \"right side numbness\"      Vital Signs     Smoking Status                   Never Smoker            Basic Information     Date Of Birth Sex Race Ethnicity Preferred Language    1955 Female Unknown Non- English      Your appointments     2017 10:00 AM   Office Procedure 1 HR with Nolberto Salinas M.D.   [facility] ([facility])    [address]  [place] [address]   [phone]              Problem List              ICD-10-CM Priority Class Noted - Resolved    Hypertension I10   Unknown - Present    Hyperlipidemia E78.5   Unknown - Present    Frequent UTI N39.0   Unknown - Present    Hemorrhoid K64.9   Unknown - Present    Hypothyroid E03.9   2012 - Present    Hyperglycemia R73.9   2012 - Present    Obesity E66.9   2012 - Present    Thyroid nodule E04.1   2013 - Present    Osteopenia M85.80   2016 - Present    Neoplasm of uncertain behavior of skin D48.5   2017 - Present      Health Maintenance        Date Due Completion Dates    IMM HEP B VACCINE (1 of 3 - Primary Series) 1955 ---    DIABETES MONOFILAMENT / LE EXAM 1955 ---    RETINAL SCREENING 1974 ---    URINE ACR / MICROALBUMIN 1974 ---    A1C SCREENING 2017, 2016, 1/15/2016    COLONOSCOPY 2017 (N/S)    Override on 2007: (N/S)    MAMMOGRAM 2017, 1/15/2015, 2015, 2013, 2012, 2011, 10/1/2010, 2009, 2009, 7/10/2008, 7/10/2008, 2007, 2007, 2006    FASTING LIPID PROFILE " 2/4/2018 2/4/2017, 1/15/2016, 12/29/2014, 12/5/2013, 11/12/2012, 8/22/2011, 10/17/2009    SERUM CREATININE 2/4/2018 2/4/2017, 1/15/2016, 12/29/2014, 12/5/2013, 11/12/2012, 8/22/2011    PAP SMEAR 12/23/2018 12/23/2015, 12/22/2014, 10/9/2012, 7/27/2009    IMM DTaP/Tdap/Td Vaccine (2 - Td) 11/26/2023 11/26/2013            Current Immunizations     Influenza TIV (IM) 11/26/2013, 10/9/2012 12:58 PM    Influenza Vaccine Quad Inj (Pf) 1/27/2017  4:02 PM, 12/23/2015 10:28 AM    Influenza Vaccine Quad Inj (Preserved) 12/22/2014  9:07 AM    SHINGLES VACCINE 1/27/2017  4:01 PM    Tdap Vaccine 11/26/2013      Below and/or attached are the medications your provider expects you to take. Review all of your home medications and newly ordered medications with your provider and/or pharmacist. Follow medication instructions as directed by your provider and/or pharmacist. Please keep your medication list with you and share with your provider. Update the information when medications are discontinued, doses are changed, or new medications (including over-the-counter products) are added; and carry medication information at all times in the event of emergency situations     Allergies:  BONIVA - (reactions not documented)               Medications  Valid as of: March 08, 2017 -  8:17 AM    Generic Name Brand Name Tablet Size Instructions for use    Albuterol Sulfate (Aero Soln) albuterol 108 (90 BASE) MCG/ACT Inhale 2 Puffs by mouth every 6 hours as needed for Shortness of Breath.        Aspirin Buf([id number]) (Tab) ASCRIPTIN 325 MG Take 2 Tabs by mouth every four hours as needed.        Azithromycin (Tab) ZITHROMAX 250  mg day one, 250 mg day 2-5        Ciprofloxacin HCl (Tab) CIPRO 500 MG TAKE ONE TABLET BY MOUTH TWICE DAILY        Cyclobenzaprine HCl (Tab) FLEXERIL 10 MG Take 1 Tab by mouth 3 times a day as needed.        Estradiol (Tab) ESTRACE 0.5 MG Take 1 Tab by mouth every day.        Hydrocortisone (Cream) PROCTOZONE  HC 2.5 % Apply bid prn        Hydrocortisone (Cream) hydrocortisone 2.5 % APPLY CREAM TOPICALLY TO AFFECTED AREA(S) TWICE DAILY AS NEEDED        Levothyroxine Sodium (Tab) SYNTHROID 112 MCG TAKE ONE TABLET BY MOUTH ONCE DAILY        Lisinopril (Tab) PRINIVIL 5 MG Take 1 Tab by mouth every day.        MetFORMIN HCl (Tab) GLUCOPHAGE 500 MG TAKE ONE TABLET BY MOUTH ONCE DAILY AT BEDTIME WITH DINNER. OK TO TAKE 0.5 TAB IF YOU HAVE STOMACH ISSUES        Promethazine-Codeine (Syrup) PHENERGAN-CODEINE 6.25-10 MG/5ML Take 5-10 mL by mouth at bedtime as needed for Cough.        Raloxifene HCl (Tab) EVISTA 60 MG Take 1 Tab by mouth every day.        Rosuvastatin Calcium (Tab) CRESTOR 40 MG Take 1 Tab by mouth every day.        .                 Medicines prescribed today were sent to:     [facility] [address] [place] [address] (), [place] - [id number] () [place] [address]    Phone: [phone] Fax: [phone]    Open 24 Hours?: No      Medication refill instructions:       If your prescription bottle indicates you have medication refills left, it is not necessary to call your provider’s office. Please contact your pharmacy and they will refill your medication.    If your prescription bottle indicates you do not have any refills left, you may request refills at any time through one of the following ways: The online [employer] system (except Urgent Care), by calling your provider’s office, or by asking your pharmacy to contact your provider’s office with a refill request. Medication refills are processed only during regular business hours and may not be available until the next business day. Your provider may request additional information or to have a follow-up visit with you prior to refilling your medication.   *Please Note: Medication refills are assigned a new Rx number when refilled electronically. Your pharmacy may indicate that no refills were authorized even though a new prescription for the same  medication is available at the pharmacy. Please request the medicine by name with the pharmacy before contacting your provider for a refill.           MyChart Access Code: Activation code not generated  Current MyChart Status: Active

## 2017-03-08 PROBLEM — C44.310 BASAL CELL CARCINOMA OF FACE: Status: ACTIVE | Noted: 2017-03-08

## 2017-03-08 NOTE — PROGRESS NOTES
Patient presents for suture removal from punch biopsy in her right cheek.   We reviewed pathology report that showed a invasive basal cell carcinoma. She scheduled an appointment with Dr Salinas for April for wider excision.    Two sutures were removed from clean and dry punch biopsy site.

## 2017-04-12 NOTE — TELEPHONE ENCOUNTER
Was the patient seen in the last year in this department? Yes     Does patient have an active prescription for medications requested? No     Received Request Via: Pharmacy     Last visit:1/27/17

## 2017-04-17 ENCOUNTER — TELEPHONE (OUTPATIENT)
Dept: MEDICAL GROUP | Facility: LAB | Age: 62
End: 2017-04-17

## 2017-04-17 DIAGNOSIS — M21.619 BUNION OF UNSPECIFIED FOOT: ICD-10-CM

## 2017-06-22 ENCOUNTER — OFFICE VISIT (OUTPATIENT)
Dept: DERMATOLOGY | Facility: IMAGING CENTER | Age: 62
End: 2017-06-22
Payer: COMMERCIAL

## 2017-06-22 DIAGNOSIS — C44.319 BASAL CELL CARCINOMA OF RIGHT LATERAL CHEEK: ICD-10-CM

## 2017-06-22 PROCEDURE — 99201 PR OFFICE/OUTPT VISIT,NEW,LEVL I: CPT | Performed by: SURGERY

## 2017-06-22 NOTE — PROGRESS NOTES
"CC:  Basal cell carcinoma of the right cheek    HPI:  63 y/o female with a lesion on her right cheek.  She is not certain how long it has been there, but she first noticed it a couple months ago when she scratched the area with her fingernail and caused some bleeding.  She was evaluated by Tiffanie Taveras who performed a punch biopsy which showed invasive basal cell carcinoma.  She is here for further management.  She does have a history of multiple skin cancers, mostly on her arms and trunk.  These have been treated with excision or with \"chemo cream\" with good results.  She has not tried treating this lesion with anything besides the biopsy.  Since the biopsy, she has not noticed any residual lesion.  She is interested in treating this one with a topical medication if possible.      Past Medical History   Diagnosis Date   • Hypertension    • Hyperlipidemia    • Frequent UTI    • HEMORRHOIDS    • OSTEOPOROSIS    • Neoplasm of uncertain behavior of skin 2/28/2017     Current Outpatient Prescriptions on File Prior to Visit   Medication Sig Dispense Refill   • metformin (GLUCOPHAGE) 500 MG Tab TAKE ONE TABLET BY MOUTH AT BEDTIME WITH DINNER. OK TO TAKE 1/2 TABLET IF YOU HAVE STOMACH ISSUES 90 Tab 0   • hydrocortisone 2.5 % Cream topical cream APPLY CREAM TOPICALLY TO AFFECTED AREA(S) TWICE DAILY AS NEEDED 28 g 0   • raloxifene (EVISTA) 60 MG Tab Take 1 Tab by mouth every day. 30 Tab 11   • azithromycin (ZITHROMAX) 250 MG Tab 500 mg day one, 250 mg day 2-5 6 Tab 0   • lisinopril (PRINIVIL) 5 MG Tab Take 1 Tab by mouth every day. 90 Tab 3   • rosuvastatin (CRESTOR) 40 MG tablet Take 1 Tab by mouth every day. 90 Tab 3   • levothyroxine (SYNTHROID) 112 MCG Tab TAKE ONE TABLET BY MOUTH ONCE DAILY 90 Tab 3   • promethazine-codeine (PHENERGAN-CODEINE) 6.25-10 MG/5ML Syrup Take 5-10 mL by mouth at bedtime as needed for Cough. 240 mL 0   • ciprofloxacin (CIPRO) 500 MG Tab TAKE ONE TABLET BY MOUTH TWICE DAILY 20 Tab 0   • " cyclobenzaprine (FLEXERIL) 10 MG Tab Take 1 Tab by mouth 3 times a day as needed. 30 Tab 0   • albuterol (VENTOLIN OR PROVENTIL) 108 (90 BASE) MCG/ACT Aero Soln inhalation aerosol Inhale 2 Puffs by mouth every 6 hours as needed for Shortness of Breath. 8.5 g 3   • estradiol (ESTRACE) 0.5 MG tablet Take 1 Tab by mouth every day. 90 Tab 3   • hydrocortisone rectal (PROCTOCREAM-HC) 2.5 % CREA Apply bid prn 1 Tube 3   • aspirin buffered (ASCRIPTIN) 325 MG TABS Take 2 Tabs by mouth every four hours as needed.       No current facility-administered medications on file prior to visit.     Allergies: Boniva    Exam:    HEENT: normocephalic, atraumatic  Eyes:  EOMI, sclera non-icteric  Lymphatics: no periauricular, submental/submandibular, or cervical adenopathy  Skin: 1.0 x 0.3 area of skin discoloration and scar from prior biopsy on right lateral cheek, just above jawline; no definite lesion seen.    Assessment: invasive basal cell carcinoma of the right cheek    Plan: we have discussed various treatment options.  She is interested in treatment with efudex, as she has had success with this in the past, and her  has recently successfully treated several skin cancers with it.  I have cautioned her that it is not as effective at invasive basal cell as it is with superficial basal cell, but because there was no gross lesion left after the biopsy, and only microscopic positive margins, I feel that it is worth a try.  She will use it for 6 weeks and follow back up 2-3 weeks after stopping to check for any residual.  Follow up sooner if she notices any growing lesions.

## 2017-06-22 NOTE — MR AVS SNAPSHOT
"        Christa Ramirez   2017 10:00 AM   Appointment   MRN: [id number]    Department:  [facility]   Dept Phone:  [phone]    Description:  Female : 1955   Provider:  Nolberto Salinas M.D.           Allergies as of 2017     Allergen Noted Reactions    Boniva [Ibandronic Acid] 2016       \"right side numbness\"      Vital Signs     Smoking Status                   Never Smoker            Basic Information     Date Of Birth Sex Race Ethnicity Preferred Language    1955 Female Unknown Non- English      Your appointments     Aug 10, 2017  1:00 PM   Follow Up Visit with Nolberto Salinas M.D.   [facility] ([facility])    [address]  [facility] [id number] [phone]           You will be receiving a confirmation call a few days before your appointment from our automated call confirmation system.              Problem List              ICD-10-CM Priority Class Noted - Resolved    Hypertension I10   Unknown - Present    Hyperlipidemia E78.5   Unknown - Present    Frequent UTI N39.0   Unknown - Present    Hemorrhoid K64.9   Unknown - Present    Hypothyroid E03.9   2012 - Present    Hyperglycemia R73.9   2012 - Present    Obesity E66.9   2012 - Present    Thyroid nodule E04.1   2013 - Present    Osteopenia M85.80   2016 - Present    Neoplasm of uncertain behavior of skin D48.5   2017 - Present    Basal cell carcinoma of face C44.310   3/8/2017 - Present      Health Maintenance        Date Due Completion Dates    DIABETES MONOFILAMENT / LE EXAM 1955 ---    RETINAL SCREENING 1974 ---    URINE ACR / MICROALBUMIN 1974 ---    A1C SCREENING 2017, 2016, 1/15/2016    COLONOSCOPY 2017 (N/S)    Override on 2007: (N/S)    MAMMOGRAM 2017, 1/15/2015, 2015, 2013, 2012, 2011, 10/1/2010, 2009, " 7/28/2009, 7/10/2008, 7/10/2008, 6/26/2007, 6/26/2007, 4/26/2006    FASTING LIPID PROFILE 2/4/2018 2/4/2017, 1/15/2016, 12/29/2014, 12/5/2013, 11/12/2012, 8/22/2011, 10/17/2009    SERUM CREATININE 2/4/2018 2/4/2017, 1/15/2016, 12/29/2014, 12/5/2013, 11/12/2012, 8/22/2011    PAP SMEAR 12/23/2018 12/23/2015, 12/22/2014, 10/9/2012, 7/27/2009    IMM DTaP/Tdap/Td Vaccine (2 - Td) 11/26/2023 11/26/2013            Current Immunizations     Influenza TIV (IM) 11/26/2013, 10/9/2012 12:58 PM    Influenza Vaccine Quad Inj (Pf) 1/27/2017  4:02 PM, 12/23/2015 10:28 AM    Influenza Vaccine Quad Inj (Preserved) 12/22/2014  9:07 AM    SHINGLES VACCINE 1/27/2017  4:01 PM    Tdap Vaccine 11/26/2013      Below and/or attached are the medications your provider expects you to take. Review all of your home medications and newly ordered medications with your provider and/or pharmacist. Follow medication instructions as directed by your provider and/or pharmacist. Please keep your medication list with you and share with your provider. Update the information when medications are discontinued, doses are changed, or new medications (including over-the-counter products) are added; and carry medication information at all times in the event of emergency situations     Allergies:  BONIVA - (reactions not documented)               Medications  Valid as of: June 22, 2017 - 11:07 AM    Generic Name Brand Name Tablet Size Instructions for use    Albuterol Sulfate (Aero Soln) albuterol 108 (90 BASE) MCG/ACT Inhale 2 Puffs by mouth every 6 hours as needed for Shortness of Breath.        Aspirin Buf([id number]) (Tab) ASCRIPTIN 325 MG Take 2 Tabs by mouth every four hours as needed.        Azithromycin (Tab) ZITHROMAX 250  mg day one, 250 mg day 2-5        Ciprofloxacin HCl (Tab) CIPRO 500 MG TAKE ONE TABLET BY MOUTH TWICE DAILY        Cyclobenzaprine HCl (Tab) FLEXERIL 10 MG Take 1 Tab by mouth 3 times a day as needed.        Estradiol  (Tab) ESTRACE 0.5 MG Take 1 Tab by mouth every day.        Hydrocortisone (Cream) PROCTOZONE HC 2.5 % Apply bid prn        Hydrocortisone (Cream) hydrocortisone 2.5 % APPLY CREAM TOPICALLY TO AFFECTED AREA(S) TWICE DAILY AS NEEDED        Levothyroxine Sodium (Tab) SYNTHROID 112 MCG TAKE ONE TABLET BY MOUTH ONCE DAILY        Lisinopril (Tab) PRINIVIL 5 MG Take 1 Tab by mouth every day.        MetFORMIN HCl (Tab) GLUCOPHAGE 500 MG TAKE ONE TABLET BY MOUTH AT BEDTIME WITH DINNER. OK TO TAKE 1/2 TABLET IF YOU HAVE STOMACH ISSUES        Promethazine-Codeine (Syrup) PHENERGAN-CODEINE 6.25-10 MG/5ML Take 5-10 mL by mouth at bedtime as needed for Cough.        Raloxifene HCl (Tab) EVISTA 60 MG Take 1 Tab by mouth every day.        Rosuvastatin Calcium (Tab) CRESTOR 40 MG Take 1 Tab by mouth every day.        .                 Medicines prescribed today were sent to:     [facility] [address] [place] [address]), [place]  [id number] () [place] [address]    Phone: [phone] Fax: [phone]    Open 24 Hours?: No      Medication refill instructions:       If your prescription bottle indicates you have medication refills left, it is not necessary to call your provider’s office. Please contact your pharmacy and they will refill your medication.    If your prescription bottle indicates you do not have any refills left, you may request refills at any time through one of the following ways: The online [employer] system (except Urgent Care), by calling your provider’s office, or by asking your pharmacy to contact your provider’s office with a refill request. Medication refills are processed only during regular business hours and may not be available until the next business day. Your provider may request additional information or to have a follow-up visit with you prior to refilling your medication.   *Please Note: Medication refills are assigned a new Rx number when refilled electronically. Your pharmacy may  indicate that no refills were authorized even though a new prescription for the same medication is available at the pharmacy. Please request the medicine by name with the pharmacy before contacting your provider for a refill.           [employer] Access Code: Activation code not generated  Current [employer] Status: Active

## 2017-06-26 RX ORDER — FLUOROURACIL 50 MG/G
CREAM TOPICAL
Qty: 1 TUBE | Refills: 0 | Status: SHIPPED | OUTPATIENT
Start: 2017-06-26 | End: 2017-11-10

## 2017-07-11 DIAGNOSIS — M85.80 OSTEOPENIA, UNSPECIFIED LOCATION: ICD-10-CM

## 2017-07-11 RX ORDER — RALOXIFENE HYDROCHLORIDE 60 MG/1
60 TABLET, FILM COATED ORAL DAILY
Qty: 90 TAB | Refills: 3 | Status: SHIPPED | OUTPATIENT
Start: 2017-07-11 | End: 2017-11-10

## 2017-07-11 NOTE — TELEPHONE ENCOUNTER
Was the patient seen in the last year in this department? Yes     Does patient have an active prescription for medications requested? No     Received Request Via: Patient     Last visit:1/27/17    Patient would like 90 day rx

## 2017-08-10 ENCOUNTER — OFFICE VISIT (OUTPATIENT)
Dept: DERMATOLOGY | Facility: IMAGING CENTER | Age: 62
End: 2017-08-10
Payer: COMMERCIAL

## 2017-08-10 DIAGNOSIS — C44.319 BASAL CELL CARCINOMA OF RIGHT CHEEK: ICD-10-CM

## 2017-08-10 PROCEDURE — 99212 OFFICE O/P EST SF 10 MIN: CPT | Performed by: SURGERY

## 2017-08-10 NOTE — MR AVS SNAPSHOT
"        Christa Ramirez   8/10/2017 1:00 PM   Office Visit   MRN: [id number]    Department:  [facility]   Dept Phone:  [phone]    Description:  Female : 1955   Provider:  Nolberto Salinas M.D.           Allergies as of 8/10/2017     Allergen Noted Reactions    Boniva [Ibandronic Acid] 2016       \"right side numbness\"      You were diagnosed with     Basal cell carcinoma of right cheek   [[id number]]         Vital Signs     Smoking Status                   Never Smoker            Basic Information     Date Of Birth Sex Race Ethnicity Preferred Language    1955 Female Unknown Non- English      Problem List              ICD-10-CM Priority Class Noted - Resolved    Hypertension I10   Unknown - Present    Hyperlipidemia E78.5   Unknown - Present    Frequent UTI N39.0   Unknown - Present    Hemorrhoid K64.9   Unknown - Present    Hypothyroid E03.9   2012 - Present    Hyperglycemia R73.9   2012 - Present    Obesity E66.9   2012 - Present    Thyroid nodule E04.1   2013 - Present    Osteopenia M85.80   2016 - Present    Neoplasm of uncertain behavior of skin D48.5   2017 - Present    Basal cell carcinoma of face C44.310   3/8/2017 - Present      Health Maintenance        Date Due Completion Dates    DIABETES MONOFILAMENT / LE EXAM 1955 ---    RETINAL SCREENING 1974 ---    URINE ACR / MICROALBUMIN 1974 ---    A1C SCREENING 2017, 2016, 1/15/2016    IMM INFLUENZA (1) 2017, 2015, 2014, 2013, 10/9/2012    COLONOSCOPY 2017 (N/S)    Override on 2007: (N/S)    MAMMOGRAM 2017, 1/15/2015, 2015, 2013, 2012, 2011, 10/1/2010, 2009, 2009, 7/10/2008, 7/10/2008, 2007, 2007, 2006    FASTING LIPID PROFILE 2018, 1/15/2016, 2014, 2013, 2012, 2011, 10/17/2009    SERUM CREATININE " 2/4/2018 2/4/2017, 1/15/2016, 12/29/2014, 12/5/2013, 11/12/2012, 8/22/2011    PAP SMEAR 12/23/2018 12/23/2015, 12/22/2014, 10/9/2012, 7/27/2009    IMM DTaP/Tdap/Td Vaccine (2 - Td) 11/26/2023 11/26/2013            Current Immunizations     Influenza TIV (IM) 11/26/2013, 10/9/2012 12:58 PM    Influenza Vaccine Quad Inj (Pf) 1/27/2017  4:02 PM, 12/23/2015 10:28 AM    Influenza Vaccine Quad Inj (Preserved) 12/22/2014  9:07 AM    SHINGLES VACCINE 1/27/2017  4:01 PM    Tdap Vaccine 11/26/2013      Below and/or attached are the medications your provider expects you to take. Review all of your home medications and newly ordered medications with your provider and/or pharmacist. Follow medication instructions as directed by your provider and/or pharmacist. Please keep your medication list with you and share with your provider. Update the information when medications are discontinued, doses are changed, or new medications (including over-the-counter products) are added; and carry medication information at all times in the event of emergency situations     Allergies:  BONIVA - (reactions not documented)               Medications  Valid as of: August 10, 2017 -  2:02 PM    Generic Name Brand Name Tablet Size Instructions for use    Albuterol Sulfate (Aero Soln) albuterol 108 (90 BASE) MCG/ACT Inhale 2 Puffs by mouth every 6 hours as needed for Shortness of Breath.        Aspirin Buf(XlIjr-JfCjl-[id number]) (Tab) ASCRIPTIN 325 MG Take 2 Tabs by mouth every four hours as needed.        Azithromycin (Tab) ZITHROMAX 250  mg day one, 250 mg day 2-5        Ciprofloxacin HCl (Tab) CIPRO 500 MG TAKE ONE TABLET BY MOUTH TWICE DAILY        Cyclobenzaprine HCl (Tab) FLEXERIL 10 MG Take 1 Tab by mouth 3 times a day as needed.        Estradiol (Tab) ESTRACE 0.5 MG Take 1 Tab by mouth every day.        Fluorouracil (Cream) EFUDEX 5 % Apply to right cheek lesion twice daily for 6 weeks.        Hydrocortisone (Cream) PROCTOZONE HC 2.5 % Apply  bid prn        Hydrocortisone (Cream) hydrocortisone 2.5 % APPLY CREAM TOPICALLY TO AFFECTED AREA(S) TWICE DAILY AS NEEDED        Levothyroxine Sodium (Tab) SYNTHROID 112 MCG TAKE ONE TABLET BY MOUTH ONCE DAILY        Lisinopril (Tab) PRINIVIL 5 MG Take 1 Tab by mouth every day.        MetFORMIN HCl (Tab) GLUCOPHAGE 500 MG TAKE ONE TABLET BY MOUTH IN THE EVENING WITH  DINNER. OK  TO TAKE ONE-HALF TABLET IF YOU HAVE STOMACH ISSUES        Promethazine-Codeine (Syrup) PHENERGAN-CODEINE 6.25-10 MG/5ML Take 5-10 mL by mouth at bedtime as needed for Cough.        Raloxifene HCl (Tab) EVISTA 60 MG Take 1 Tab by mouth every day.        Rosuvastatin Calcium (Tab) CRESTOR 40 MG Take 1 Tab by mouth every day.        .                 Medicines prescribed today were sent to:     [facility] [address] [place] [address] (), [place] - [id number] () [place] [address]    Phone: [phone] Fax: [phone]    Open 24 Hours?: No      Medication refill instructions:       If your prescription bottle indicates you have medication refills left, it is not necessary to call your provider’s office. Please contact your pharmacy and they will refill your medication.    If your prescription bottle indicates you do not have any refills left, you may request refills at any time through one of the following ways: The online [employer] system (except Urgent Care), by calling your provider’s office, or by asking your pharmacy to contact your provider’s office with a refill request. Medication refills are processed only during regular business hours and may not be available until the next business day. Your provider may request additional information or to have a follow-up visit with you prior to refilling your medication.   *Please Note: Medication refills are assigned a new Rx number when refilled electronically. Your pharmacy may indicate that no refills were authorized even though a new prescription for the same medication is  available at the pharmacy. Please request the medicine by name with the pharmacy before contacting your provider for a refill.           MyChart Access Code: Activation code not generated  Current MyChart Status: Active

## 2017-08-10 NOTE — PROGRESS NOTES
S:  Here for follow up for efudex treatment of basal cell carcinoma of the right jawline.  She used the efudex for 6 weeks, and did not notice anything.  She says that she has been unable to see any trace of the lesion since the biopsy has healed.  No new lesions, and no new issues.    O:  Right jawline - scar visible from previous biopsy, but no surrounding lesion or discoloration.    A:  Basal cell carcinoma right cheek (jawline).     P:  Though the biopsy showed positive margins, she does reiterate that essentially all of the lesion was removed with the biopsy.  She has used efudex for 6 weeks, and there is still no trace of any lesion.  We have discussed different treatment options, and she would like to just monitor the area for now.  She will follow up in 3 months, or sooner if there is any change.

## 2017-09-25 DIAGNOSIS — N39.0 ACUTE UTI: ICD-10-CM

## 2017-09-25 RX ORDER — CIPROFLOXACIN 500 MG/1
500 TABLET, FILM COATED ORAL 2 TIMES DAILY
Qty: 10 TAB | Refills: 0 | Status: SHIPPED | OUTPATIENT
Start: 2017-09-25 | End: 2017-09-30

## 2017-11-10 ENCOUNTER — OFFICE VISIT (OUTPATIENT)
Dept: MEDICAL GROUP | Facility: PHYSICIAN GROUP | Age: 62
End: 2017-11-10
Payer: COMMERCIAL

## 2017-11-10 VITALS
WEIGHT: 200 LBS | SYSTOLIC BLOOD PRESSURE: 124 MMHG | HEIGHT: 66 IN | TEMPERATURE: 97.5 F | RESPIRATION RATE: 14 BRPM | OXYGEN SATURATION: 95 % | DIASTOLIC BLOOD PRESSURE: 78 MMHG | BODY MASS INDEX: 32.14 KG/M2 | HEART RATE: 76 BPM

## 2017-11-10 DIAGNOSIS — E04.1 THYROID NODULE: ICD-10-CM

## 2017-11-10 DIAGNOSIS — E78.2 MIXED HYPERLIPIDEMIA: ICD-10-CM

## 2017-11-10 DIAGNOSIS — Z23 NEED FOR VACCINATION: ICD-10-CM

## 2017-11-10 DIAGNOSIS — E03.8 HYPOTHYROIDISM DUE TO HASHIMOTO'S THYROIDITIS: ICD-10-CM

## 2017-11-10 DIAGNOSIS — E66.09 CLASS 1 OBESITY DUE TO EXCESS CALORIES WITHOUT SERIOUS COMORBIDITY WITH BODY MASS INDEX (BMI) OF 30.0 TO 30.9 IN ADULT: ICD-10-CM

## 2017-11-10 DIAGNOSIS — M25.561 CHRONIC PAIN OF RIGHT KNEE: ICD-10-CM

## 2017-11-10 DIAGNOSIS — E11.9 TYPE 2 DIABETES MELLITUS WITHOUT COMPLICATION, WITHOUT LONG-TERM CURRENT USE OF INSULIN (HCC): ICD-10-CM

## 2017-11-10 DIAGNOSIS — I10 ESSENTIAL HYPERTENSION: ICD-10-CM

## 2017-11-10 DIAGNOSIS — E55.9 VITAMIN D DEFICIENCY: ICD-10-CM

## 2017-11-10 DIAGNOSIS — N39.0 FREQUENT UTI: ICD-10-CM

## 2017-11-10 DIAGNOSIS — K64.9 HEMORRHOIDS, UNSPECIFIED HEMORRHOID TYPE: ICD-10-CM

## 2017-11-10 DIAGNOSIS — G89.29 CHRONIC PAIN OF RIGHT KNEE: ICD-10-CM

## 2017-11-10 DIAGNOSIS — E06.3 HYPOTHYROIDISM DUE TO HASHIMOTO'S THYROIDITIS: ICD-10-CM

## 2017-11-10 DIAGNOSIS — Z12.11 SCREEN FOR COLON CANCER: ICD-10-CM

## 2017-11-10 DIAGNOSIS — M85.80 OSTEOPENIA, UNSPECIFIED LOCATION: ICD-10-CM

## 2017-11-10 PROBLEM — C44.310 BASAL CELL CARCINOMA OF FACE: Status: RESOLVED | Noted: 2017-03-08 | Resolved: 2017-11-10

## 2017-11-10 PROBLEM — D48.5 NEOPLASM OF UNCERTAIN BEHAVIOR OF SKIN: Status: RESOLVED | Noted: 2017-02-28 | Resolved: 2017-11-10

## 2017-11-10 PROCEDURE — 99214 OFFICE O/P EST MOD 30 MIN: CPT | Mod: 25 | Performed by: INTERNAL MEDICINE

## 2017-11-10 PROCEDURE — 90471 IMMUNIZATION ADMIN: CPT | Performed by: INTERNAL MEDICINE

## 2017-11-10 PROCEDURE — 90686 IIV4 VACC NO PRSV 0.5 ML IM: CPT | Performed by: INTERNAL MEDICINE

## 2017-11-11 NOTE — ASSESSMENT & PLAN NOTE
She tells me that she is in medication for osteoporosis, but she does not remember name. She used to be on prolia infusion, but it is expensive   DEXA Scan 2016:  FINDINGS:  lumbar spine T score of -2.3   The proximal left femur  T score of -1.3     When compared with the most recent study dated 1/9/2015, there has been a 1.6% increase in the bone mineral density of the lumbar spine and a 1.2% increase in the bone mineral density of the left femur.

## 2017-11-11 NOTE — ASSESSMENT & PLAN NOTE
She is trying low calories, exercise, she has lost weight. Counseling for a small portion, balanced diet, exercising for3-5 times per week.

## 2017-11-11 NOTE — ASSESSMENT & PLAN NOTE
TSH nl 02/2017. She has been on similar levothyroxine dose for many years. She denies hair loss, dry skin, constipation,

## 2017-11-11 NOTE — ASSESSMENT & PLAN NOTE
Well controled. She is only on metformin 500 mg daily. Last a1c 6.2. She has no complication, she is having low carb diet. She is on statin, asa 81 mg daily, she is on ACE. She has no complications. She has had regular retinal screening

## 2017-11-13 ENCOUNTER — TELEPHONE (OUTPATIENT)
Dept: MEDICAL GROUP | Facility: PHYSICIAN GROUP | Age: 62
End: 2017-11-13

## 2017-11-13 RX ORDER — RALOXIFENE HYDROCHLORIDE 60 MG/1
60 TABLET, FILM COATED ORAL DAILY
Qty: 30 TAB | Refills: 11 | COMMUNITY
Start: 2017-11-13 | End: 2018-04-13 | Stop reason: SDUPTHER

## 2017-11-14 NOTE — PROGRESS NOTES
Subjective:   Christa Ramirez is a 62 y.o. female here today for establish, diabetes     Vitamin D deficiency  She is on vitamin d supplement, last lab work vitamin D level, not on file     Type 2 diabetes mellitus without complication (CMS-[facility])  Well controled. She is only on metformin 500 mg daily. Last a1c 6.2. She has no complication, she is having low carb diet. She is on statin, asa 81 mg daily, she is on ACE. She has no complications. She has had regular retinal screening     Osteopenia  She tells me that she is in medication for osteoporosis, but she does not remember name. She used to be on prolia infusion, but it is expensive   DEXA Scan 2016:  FINDINGS:  lumbar spine T score of -2.3   The proximal left femur  T score of -1.3     When compared with the most recent study dated 1/9/2015, there has been a 1.6% increase in the bone mineral density of the lumbar spine and a 1.2% increase in the bone mineral density of the left femur.    Obesity  She is trying low calories, exercise, she has lost weight. Counseling for a small portion, balanced diet, exercising for3-5 times per week.      Hypothyroid  TSH nl 02/2017. She has been on similar levothyroxine dose for many years. She denies hair loss, dry skin, constipation,    Hypertension  Blood pressure well controlled on lisinopril 5 mg daily. She denies chest pain, palpitation, headache, blurry vision, lightheadedness, leg swelling, orthopnea.    Hyperlipidemia  Last lipid panel 02/2017. Well managed on crestor 40 mg daily. Primary prevention     Hemorrhoid  Not bothering. She uses hydrocortisone cream prn     Frequent UTI  She has history of frequent UTI. She has seen uorlogist. She takes cipro prn , when she feels that she is getting a UTI. Today. She denies dysuria, hematuria, flank pain    Chronic pain of right knee  She has chronic bilateral knee, but the right is worse. She has seen orthopedic. She was told she needs knee replacement, but she is  putting on hold.       Current medicines (including changes today)  Current Outpatient Prescriptions   Medication Sig Dispense Refill   • metformin (GLUCOPHAGE) 500 MG Tab TAKE ONE TABLET BY MOUTH ONCE DAILY IN THE EVENING WITH  DINNER  *OK  TO  TAKE  ONE-HALF  IF  YOU  HAVE  STOMACH  ISSUES* 90 Tab 1   • lisinopril (PRINIVIL) 5 MG Tab Take 1 Tab by mouth every day. 90 Tab 3   • rosuvastatin (CRESTOR) 40 MG tablet Take 1 Tab by mouth every day. 90 Tab 3   • levothyroxine (SYNTHROID) 112 MCG Tab TAKE ONE TABLET BY MOUTH ONCE DAILY 90 Tab 3   • ciprofloxacin (CIPRO) 500 MG Tab TAKE ONE TABLET BY MOUTH TWICE DAILY 20 Tab 0   • hydrocortisone rectal (PROCTOCREAM-HC) 2.5 % CREA Apply bid prn 1 Tube 3   • aspirin buffered (ASCRIPTIN) 325 MG TABS Take 2 Tabs by mouth every four hours as needed.     • raloxifene (EVISTA) 60 MG Tab Take 1 Tab by mouth every day. 30 Tab 11     No current facility-administered medications for this visit.      She  has a past medical history of Cancer (CMS-[facility]); Diabetes (CMS-[facility]); Frequent UTI; HEMORRHOIDS; Hyperlipidemia; Hypertension; Neoplasm of uncertain behavior of skin (2/28/2017); OSTEOPOROSIS; and Thyroid disease.    Current Outpatient Prescriptions   Medication Sig Dispense Refill   • metformin (GLUCOPHAGE) 500 MG Tab TAKE ONE TABLET BY MOUTH ONCE DAILY IN THE EVENING WITH  DINNER  *OK  TO  TAKE  ONE-HALF  IF  YOU  HAVE  STOMACH  ISSUES* 90 Tab 1   • lisinopril (PRINIVIL) 5 MG Tab Take 1 Tab by mouth every day. 90 Tab 3   • rosuvastatin (CRESTOR) 40 MG tablet Take 1 Tab by mouth every day. 90 Tab 3   • levothyroxine (SYNTHROID) 112 MCG Tab TAKE ONE TABLET BY MOUTH ONCE DAILY 90 Tab 3   • ciprofloxacin (CIPRO) 500 MG Tab TAKE ONE TABLET BY MOUTH TWICE DAILY 20 Tab 0   • hydrocortisone rectal (PROCTOCREAM-HC) 2.5 % CREA Apply bid prn 1 Tube 3   • aspirin buffered (ASCRIPTIN) 325 MG TABS Take 2 Tabs by mouth every four hours as needed.     • raloxifene (EVISTA) 60 MG Tab Take 1 Tab by  "mouth every day. 30 Tab 11     No current facility-administered medications for this visit.        Allergies as of 11/10/2017 - Reviewed 11/10/2017   Allergen Reaction Noted   • Boniva [ibandronic acid]  05/13/2016       Social History     Social History   • Marital status:      Spouse name: N/A   • Number of children: N/A   • Years of education: N/A     Occupational History   • Not on file.     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[employer]     Other Topics Concern   • Not on file     Social History Narrative   • No narrative on file        Family History   Problem Relation Age of Onset   • Heart Disease Mother    • Hyperlipidemia Brother    • Heart Disease Brother 65     MI   • Heart Disease Maternal Grandmother    • Hyperlipidemia Maternal Grandmother    • Cancer Neg Hx        Past Surgical History:   Procedure Laterality Date   • COLONOSCOPY  2007    recheck 5 years   • ABDOMINAL HYSTERECTOMY TOTAL      partial   • APPENDECTOMY     • BLADDER SUSPENSION     • KNEE ARTHROPLASTY TOTAL     • TONSILLECTOMY         ROS   All systems reviewed are negative except for HPI       Objective:     Blood pressure 124/78, pulse 76, temperature 36.4 °C (97.5 °F), resp. rate 14, height 1.676 m (5' 6\"), weight 90.7 kg (200 lb), SpO2 95 %, not currently breastfeeding. Body mass index is 32.28 kg/m².   Physical Exam:  Constitutional: Alert, no distress.  Skin: Warm, dry, good turgor, no rashes in visible areas.  Eye: Equal, round and reactive, conjunctiva clear, lids normal.  ENMT: Lips without lesions, good dentition, oropharynx clear.  Neck: Trachea midline, no masses, no thyromegaly. No cervical or supraclavicular lymphadenopathy  Respiratory: Unlabored respiratory effort, lungs clear to auscultation, no wheezes, no ronchi.  Cardiovascular: Normal S1, S2, no " murmur, no edema.  Abdomen: Soft, non-tender, no masses, no hepatosplenomegaly.  Psych: Alert and oriented x3, normal affect and mood.        Assessment and Plan:   The following treatment plan was discussed    1. Frequent UTI  cipro refill, if pt feels she is developing uTI   - CBC WITH DIFFERENTIAL; Future    2. Hemorrhoids, unspecified hemorrhoid type  Steroid cream prn   - CBC WITH DIFFERENTIAL; Future  - COMP METABOLIC PANEL; Future    3. Type 2 diabetes mellitus without complication, without long-term current use of insulin (CMS-HCC)  Monofilament testing with a 10 gram force: sensation intact: intact bilaterally  Visual Inspection: Feet without maceration, ulcers, fissures.  Pedal pulses: intact bilaterally  Continue same treatment, I encourage weight loss, eating healthy.   continue to monitor   - CBC WITH DIFFERENTIAL; Future  - COMP METABOLIC PANEL; Future  - HEMOGLOBIN A1C; Future  - MICROALBUMIN CREAT RATIO URINE; Future  - LIPID PROFILE; Future    4. Essential hypertension  Continue same treatment, continue to monitor   - CBC WITH DIFFERENTIAL; Future  - COMP METABOLIC PANEL; Future  - LIPID PROFILE; Future    5. Mixed hyperlipidemia  Continue same treatment, continue to monitor   - CBC WITH DIFFERENTIAL; Future  - COMP METABOLIC PANEL; Future  - LIPID PROFILE; Future    6. Hypothyroidism due to Hashimoto's thyroiditis  Continue same treatment, continue to monitor   - CBC WITH DIFFERENTIAL; Future  - COMP METABOLIC PANEL; Future  - TSH WITH REFLEX TO FT4; Future    7. Thyroid nodule  Consider repeat US .  - CBC WITH DIFFERENTIAL; Future  - COMP METABOLIC PANEL; Future  - TSH WITH REFLEX TO FT4; Future    8. Osteopenia, unspecified location  Continue vitamin d and Ca   - CBC WITH DIFFERENTIAL; Future  - COMP METABOLIC PANEL; Future    9. Class 1 obesity due to excess calories without serious comorbidity with body mass index (BMI) of 30.0 to 30.9 in adult  Counseling for a small portion, balanced diet,  exercising 3-5 times per week.    - Patient identified as having weight management issue.  Appropriate orders and counseling given.  - CBC WITH DIFFERENTIAL; Future  - COMP METABOLIC PANEL; Future    10. Need for vaccination  - INFLUENZA VACCINE QUAD INJ >3Y(PF)    11. Screen for colon cancer    - OCCULT BLOOD FECES IMMUNOASSAY (FIT); Future      12. Chronic pain of right knee  Consider XR, ortho referral whenever pt needs   - CBC WITH DIFFERENTIAL; Future  - COMP METABOLIC PANEL; Future    13. Vitamin D deficiency  Continue supplement, continue to monitor.   - VITAMIN D,25 HYDROXY; Future      Followup: Return in about 6 months (around 5/10/2018) for Short, follow up on DMII.

## 2017-11-14 NOTE — ASSESSMENT & PLAN NOTE
She has history of frequent UTI. She has seen uorlogist. She takes cipro prn , when she feels that she is getting a UTI. Today. She denies dysuria, hematuria, flank pain

## 2017-11-14 NOTE — TELEPHONE ENCOUNTER
----- Message from Jackeline Cueva, Med Ass't sent at 11/13/2017  7:15 AM PST -----  Regarding: FW: Prescription Question  Contact: [phone]      ----- Message -----  From: Christa Ramirez  Sent: 11/11/2017   6:35 PM  To: Jeff Mg Ma  Subject: Prescription Question                            Yg De Guzman-   It was nice to meet you yesterday. The medication for my bones that I am taking is Raloxifene HCL 60 MG.  Also, I seem to have left the forms for blood work and knee x-ray referral that you gave me in your office.  I will stop by on Monday and see if I can get those from the  staff.    Many thanks,    Christa Ramirez

## 2017-11-14 NOTE — ASSESSMENT & PLAN NOTE
She has chronic bilateral knee, but the right is worse. She has seen orthopedic. She was told she needs knee replacement, but she is putting on hold.

## 2017-11-14 NOTE — ASSESSMENT & PLAN NOTE
Blood pressure well controlled on lisinopril 5 mg daily. She denies chest pain, palpitation, headache, blurry vision, lightheadedness, leg swelling, orthopnea.

## 2017-12-14 RX ORDER — CIPROFLOXACIN 500 MG/1
TABLET, FILM COATED ORAL
Qty: 20 TAB | Refills: 0 | Status: SHIPPED | OUTPATIENT
Start: 2017-12-14 | End: 2018-06-06 | Stop reason: SDUPTHER

## 2017-12-29 ENCOUNTER — HOSPITAL ENCOUNTER (OUTPATIENT)
Dept: RADIOLOGY | Facility: MEDICAL CENTER | Age: 62
End: 2017-12-29
Attending: INTERNAL MEDICINE
Payer: COMMERCIAL

## 2017-12-29 DIAGNOSIS — Z12.39 SCREENING FOR MALIGNANT NEOPLASM OF BREAST: ICD-10-CM

## 2017-12-29 PROCEDURE — G0202 SCR MAMMO BI INCL CAD: HCPCS

## 2018-02-05 ENCOUNTER — OFFICE VISIT (OUTPATIENT)
Dept: URGENT CARE | Facility: CLINIC | Age: 63
End: 2018-02-05
Payer: COMMERCIAL

## 2018-02-05 VITALS
SYSTOLIC BLOOD PRESSURE: 110 MMHG | RESPIRATION RATE: 18 BRPM | DIASTOLIC BLOOD PRESSURE: 60 MMHG | HEART RATE: 84 BPM | OXYGEN SATURATION: 97 % | BODY MASS INDEX: 30.67 KG/M2 | WEIGHT: 190.8 LBS | HEIGHT: 66 IN | TEMPERATURE: 97.6 F

## 2018-02-05 DIAGNOSIS — R11.0 NAUSEA: ICD-10-CM

## 2018-02-05 DIAGNOSIS — K52.9 GASTROENTERITIS: ICD-10-CM

## 2018-02-05 PROCEDURE — 99214 OFFICE O/P EST MOD 30 MIN: CPT | Performed by: PHYSICIAN ASSISTANT

## 2018-02-05 RX ORDER — ONDANSETRON 4 MG/1
4 TABLET, FILM COATED ORAL EVERY 6 HOURS PRN
Qty: 20 TAB | Refills: 1 | Status: SHIPPED | OUTPATIENT
Start: 2018-02-05 | End: 2018-03-08

## 2018-02-05 RX ORDER — ONDANSETRON 4 MG/1
4 TABLET, ORALLY DISINTEGRATING ORAL ONCE
Status: COMPLETED | OUTPATIENT
Start: 2018-02-05 | End: 2018-02-05

## 2018-02-05 RX ADMIN — ONDANSETRON 4 MG: 4 TABLET, ORALLY DISINTEGRATING ORAL at 08:56

## 2018-02-05 ASSESSMENT — ENCOUNTER SYMPTOMS
HEADACHES: 0
SHORTNESS OF BREATH: 0
VOMITING: 1
FLANK PAIN: 0
DIARRHEA: 0
ROS GI COMMENTS: 1
FEVER: 0
NAUSEA: 1
NUMBER OF EPISODES OF EMESIS TODAY: 1
HEARTBURN: 0
SENSORY CHANGE: 0
CONSTIPATION: 0
CHILLS: 0
COUGH: 0
SORE THROAT: 0
BLOOD IN STOOL: 0
ABDOMINAL PAIN: 0
FOCAL WEAKNESS: 0

## 2018-02-05 NOTE — PROGRESS NOTES
"Subjective:   Christa Ramirez is a 62 y.o. female who presents for GI Problem (cannot keep anything in stomach, bubbling feeling) and Emesis (x3days)        GI Problem   This is a new problem. The current episode started in the past 7 days (3d). Associated symptoms include nausea and vomiting. Pertinent negatives include no abdominal pain ( more dyspepsia), chest pain, chills, congestion, coughing, fever, headaches, rash or sore throat.   Emesis   Associated symptoms include nausea and vomiting. Pertinent negatives include no abdominal pain ( more dyspepsia), chest pain, chills, congestion, coughing, fever, headaches, rash or sore throat.   Notes nausea/vomiting, denies diarrhea, notes started Wednesday night, no emesis until last night,  Denies recent: abx, international travel, preparing water from outdoor sources or suspicious food sources. 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 Denies PMH of abd surg.      Denies suspect food, denies eating any foods of concern. Denies PMH of abd surgery, denies abd pain c/o dyspepsia, deneiies dysuria/freq/urg/hematuria.     Review of Systems   Constitutional: Negative for chills and fever.   HENT: Negative for congestion and sore throat.    Respiratory: Negative for cough and shortness of breath.    Cardiovascular: Negative for chest pain and leg swelling.   Gastrointestinal: Positive for nausea and vomiting. Negative for abdominal pain ( more dyspepsia), blood in stool, constipation, diarrhea, heartburn and melena.        1   Genitourinary: Negative for dysuria, flank pain, frequency, hematuria and urgency.   Skin: Negative for rash.   Neurological: Negative for sensory change, focal weakness and headaches.     Allergies   Allergen Reactions   • Boniva [Ibandronic Acid]      \"right side numbness\"     I have worn a mask for the entire encounter with this patient.      Objective:   /60   Pulse 84   Temp 36.4 °C (97.6 °F)   Resp 18  " " Ht 1.676 m (5' 6\")   Wt 86.5 kg (190 lb 12.8 oz)   SpO2 97%   BMI 30.80 kg/m²   Physical Exam   Constitutional: She is oriented to person, place, and time. She appears well-developed and well-nourished. No distress.   HENT:   Head: Normocephalic and atraumatic.   Right Ear: External ear normal.   Left Ear: External ear normal.   Nose: Nose normal.   Mouth/Throat: Uvula is midline, oropharynx is clear and moist and mucous membranes are normal.   Eyes: Conjunctivae and lids are normal. Right eye exhibits no discharge. Left eye exhibits no discharge. No scleral icterus.   Neck: Neck supple.   Pulmonary/Chest: Effort normal. No respiratory distress.   Abdominal: Soft. Normal appearance and bowel sounds are normal. She exhibits no distension and no mass. There is no tenderness. There is no rigidity, no rebound, no guarding, no CVA tenderness, no tenderness at McBurney's point and negative Salinas's sign.   Musculoskeletal: Normal range of motion.   Lymphadenopathy:     She has no cervical adenopathy.   Neurological: She is alert and oriented to person, place, and time. She is not disoriented.   Skin: Skin is warm and dry. She is not diaphoretic. No erythema. No pallor.   Psychiatric: Her speech is normal and behavior is normal.   Nursing note and vitals reviewed.  POCT zofran - tolerates well      Assessment/Plan:   Assessment    1. Gastroenteritis  Supportive care is reviewed with patient/caregiver - recommend to push PO fluids and electrolytes, discuss typical course, sent w/ school /' work note, zofran / imoidum, BRAT diet, trend resolution  Return to clinic with lack of resolution or progression of symptoms.  - ondansetron (ZOFRAN) 4 MG Tab tablet; Take 1 Tab by mouth every 6 hours as needed for Nausea/Vomiting.  Dispense: 20 Tab; Refill: 1    2. Nausea    - ondansetron (ZOFRAN) 4 MG Tab tablet; Take 1 Tab by mouth every 6 hours as needed for Nausea/Vomiting.  Dispense: 20 Tab; Refill: 1  - ondansetron (ZOFRAN " ODT) dispertab 4 mg; Take 1 Tab by mouth Once.

## 2018-02-05 NOTE — LETTER
February 5, 2018       Patient: Christa Ramirez   YOB: 1955   Date of Visit: 2/5/2018         To Whom It May Concern:    It is my medical opinion that Christa Ramirez should be excused from work for Wednesday - Friday of last week and Monday and Tuesday of this week due to illness.        If you have any questions or concerns, please don't hesitate to call [phone]          Sincerely,          Elias Funez P.A.-C.  Electronically Signed

## 2018-02-09 DIAGNOSIS — E78.49 OTHER HYPERLIPIDEMIA: ICD-10-CM

## 2018-02-09 RX ORDER — ROSUVASTATIN CALCIUM 40 MG/1
TABLET, COATED ORAL
Qty: 90 TAB | Refills: 3 | Status: SHIPPED | OUTPATIENT
Start: 2018-02-09 | End: 2019-02-11 | Stop reason: SDUPTHER

## 2018-03-01 ENCOUNTER — PATIENT MESSAGE (OUTPATIENT)
Dept: MEDICAL GROUP | Facility: PHYSICIAN GROUP | Age: 63
End: 2018-03-01

## 2018-03-01 DIAGNOSIS — E06.3 HYPOTHYROIDISM DUE TO HASHIMOTO'S THYROIDITIS: ICD-10-CM

## 2018-03-01 DIAGNOSIS — I10 ESSENTIAL HYPERTENSION: ICD-10-CM

## 2018-03-01 DIAGNOSIS — E55.9 VITAMIN D DEFICIENCY: ICD-10-CM

## 2018-03-01 DIAGNOSIS — E03.8 HYPOTHYROIDISM DUE TO HASHIMOTO'S THYROIDITIS: ICD-10-CM

## 2018-03-01 DIAGNOSIS — E11.9 TYPE 2 DIABETES MELLITUS WITHOUT COMPLICATION, WITHOUT LONG-TERM CURRENT USE OF INSULIN (HCC): ICD-10-CM

## 2018-03-01 DIAGNOSIS — E78.2 MIXED HYPERLIPIDEMIA: ICD-10-CM

## 2018-03-01 NOTE — TELEPHONE ENCOUNTER
----- Message from Isadora Kaminski, Med Ass't sent at 3/1/2018  7:28 AM PST -----  Regarding: FW: Non-Urgent Medical Question  Contact: [phone]      ----- Message -----  From: Christa Ramirez  Sent: 3/1/2018   7:20 AM  To: Jeff Mccarthy Ma  Subject: Non-Urgent Medical Question                      Good morning-  Have my physical with you next week, 8th.  Should I have the blood work done ahead of time or after? How do I go about getting the paperwork for that or is it in the system and I just come in and do it at your facility?  Thanks!

## 2018-03-02 RX ORDER — LISINOPRIL 5 MG/1
TABLET ORAL
Qty: 90 TAB | Refills: 3 | Status: SHIPPED | OUTPATIENT
Start: 2018-03-02 | End: 2019-03-25 | Stop reason: SDUPTHER

## 2018-03-05 ENCOUNTER — HOSPITAL ENCOUNTER (OUTPATIENT)
Dept: LAB | Facility: MEDICAL CENTER | Age: 63
End: 2018-03-05
Attending: INTERNAL MEDICINE
Payer: COMMERCIAL

## 2018-03-05 DIAGNOSIS — E66.09 CLASS 1 OBESITY DUE TO EXCESS CALORIES WITHOUT SERIOUS COMORBIDITY WITH BODY MASS INDEX (BMI) OF 30.0 TO 30.9 IN ADULT: ICD-10-CM

## 2018-03-05 DIAGNOSIS — N39.0 FREQUENT UTI: ICD-10-CM

## 2018-03-05 DIAGNOSIS — Z12.11 SCREEN FOR COLON CANCER: ICD-10-CM

## 2018-03-05 DIAGNOSIS — E03.8 HYPOTHYROIDISM DUE TO HASHIMOTO'S THYROIDITIS: ICD-10-CM

## 2018-03-05 DIAGNOSIS — Z23 NEED FOR VACCINATION: ICD-10-CM

## 2018-03-05 DIAGNOSIS — E06.3 HYPOTHYROIDISM DUE TO HASHIMOTO'S THYROIDITIS: ICD-10-CM

## 2018-03-05 DIAGNOSIS — M25.561 CHRONIC PAIN OF RIGHT KNEE: ICD-10-CM

## 2018-03-05 DIAGNOSIS — K64.9 HEMORRHOIDS, UNSPECIFIED HEMORRHOID TYPE: ICD-10-CM

## 2018-03-05 DIAGNOSIS — E55.9 VITAMIN D DEFICIENCY: ICD-10-CM

## 2018-03-05 DIAGNOSIS — M85.80 OSTEOPENIA, UNSPECIFIED LOCATION: ICD-10-CM

## 2018-03-05 DIAGNOSIS — E11.9 TYPE 2 DIABETES MELLITUS WITHOUT COMPLICATION, WITHOUT LONG-TERM CURRENT USE OF INSULIN (HCC): ICD-10-CM

## 2018-03-05 DIAGNOSIS — E04.1 THYROID NODULE: ICD-10-CM

## 2018-03-05 DIAGNOSIS — G89.29 CHRONIC PAIN OF RIGHT KNEE: ICD-10-CM

## 2018-03-05 DIAGNOSIS — E78.2 MIXED HYPERLIPIDEMIA: ICD-10-CM

## 2018-03-05 DIAGNOSIS — I10 ESSENTIAL HYPERTENSION: ICD-10-CM

## 2018-03-05 LAB
25(OH)D3 SERPL-MCNC: 25 NG/ML (ref 30–100)
ALBUMIN SERPL BCP-MCNC: 4.2 G/DL (ref 3.2–4.9)
ALBUMIN/GLOB SERPL: 1.4 G/DL
ALP SERPL-CCNC: 55 U/L (ref 30–99)
ALT SERPL-CCNC: 15 U/L (ref 2–50)
ANION GAP SERPL CALC-SCNC: 8 MMOL/L (ref 0–11.9)
AST SERPL-CCNC: 20 U/L (ref 12–45)
BASOPHILS # BLD AUTO: 0.6 % (ref 0–1.8)
BASOPHILS # BLD: 0.04 K/UL (ref 0–0.12)
BILIRUB SERPL-MCNC: 0.5 MG/DL (ref 0.1–1.5)
BUN SERPL-MCNC: 18 MG/DL (ref 8–22)
CALCIUM SERPL-MCNC: 9.8 MG/DL (ref 8.5–10.5)
CHLORIDE SERPL-SCNC: 107 MMOL/L (ref 96–112)
CHOLEST SERPL-MCNC: 152 MG/DL (ref 100–199)
CO2 SERPL-SCNC: 25 MMOL/L (ref 20–33)
CREAT SERPL-MCNC: 0.66 MG/DL (ref 0.5–1.4)
CREAT UR-MCNC: 170.4 MG/DL
EOSINOPHIL # BLD AUTO: 0.15 K/UL (ref 0–0.51)
EOSINOPHIL NFR BLD: 2.3 % (ref 0–6.9)
ERYTHROCYTE [DISTWIDTH] IN BLOOD BY AUTOMATED COUNT: 48.9 FL (ref 35.9–50)
EST. AVERAGE GLUCOSE BLD GHB EST-MCNC: 151 MG/DL
GLOBULIN SER CALC-MCNC: 3 G/DL (ref 1.9–3.5)
GLUCOSE SERPL-MCNC: 116 MG/DL (ref 65–99)
HBA1C MFR BLD: 6.9 % (ref 0–5.6)
HCT VFR BLD AUTO: 43.8 % (ref 37–47)
HDLC SERPL-MCNC: 49 MG/DL
HGB BLD-MCNC: 14.2 G/DL (ref 12–16)
IMM GRANULOCYTES # BLD AUTO: 0.02 K/UL (ref 0–0.11)
IMM GRANULOCYTES NFR BLD AUTO: 0.3 % (ref 0–0.9)
LDLC SERPL CALC-MCNC: 82 MG/DL
LYMPHOCYTES # BLD AUTO: 1.4 K/UL (ref 1–4.8)
LYMPHOCYTES NFR BLD: 21.5 % (ref 22–41)
MCH RBC QN AUTO: 28.4 PG (ref 27–33)
MCHC RBC AUTO-ENTMCNC: 32.4 G/DL (ref 33.6–35)
MCV RBC AUTO: 87.6 FL (ref 81.4–97.8)
MICROALBUMIN UR-MCNC: 1.1 MG/DL
MICROALBUMIN/CREAT UR: 6 MG/G (ref 0–30)
MONOCYTES # BLD AUTO: 0.45 K/UL (ref 0–0.85)
MONOCYTES NFR BLD AUTO: 6.9 % (ref 0–13.4)
NEUTROPHILS # BLD AUTO: 4.45 K/UL (ref 2–7.15)
NEUTROPHILS NFR BLD: 68.4 % (ref 44–72)
NRBC # BLD AUTO: 0 K/UL
NRBC BLD-RTO: 0 /100 WBC
PLATELET # BLD AUTO: 286 K/UL (ref 164–446)
PMV BLD AUTO: 9.3 FL (ref 9–12.9)
POTASSIUM SERPL-SCNC: 3.9 MMOL/L (ref 3.6–5.5)
PROT SERPL-MCNC: 7.2 G/DL (ref 6–8.2)
RBC # BLD AUTO: 5 M/UL (ref 4.2–5.4)
SODIUM SERPL-SCNC: 140 MMOL/L (ref 135–145)
TRIGL SERPL-MCNC: 105 MG/DL (ref 0–149)
TSH SERPL DL<=0.005 MIU/L-ACNC: 0.86 UIU/ML (ref 0.38–5.33)
WBC # BLD AUTO: 6.5 K/UL (ref 4.8–10.8)

## 2018-03-05 PROCEDURE — 80053 COMPREHEN METABOLIC PANEL: CPT

## 2018-03-05 PROCEDURE — 82043 UR ALBUMIN QUANTITATIVE: CPT

## 2018-03-05 PROCEDURE — 84443 ASSAY THYROID STIM HORMONE: CPT

## 2018-03-05 PROCEDURE — 83036 HEMOGLOBIN GLYCOSYLATED A1C: CPT

## 2018-03-05 PROCEDURE — 36415 COLL VENOUS BLD VENIPUNCTURE: CPT

## 2018-03-05 PROCEDURE — 82306 VITAMIN D 25 HYDROXY: CPT

## 2018-03-05 PROCEDURE — 85025 COMPLETE CBC W/AUTO DIFF WBC: CPT

## 2018-03-05 PROCEDURE — 80061 LIPID PANEL: CPT

## 2018-03-05 PROCEDURE — 82570 ASSAY OF URINE CREATININE: CPT

## 2018-03-06 NOTE — PROGRESS NOTES
Dk starks  I wanted to let you know that the labs look good. Glucose is worse.  We can discuss at your next appointment.   Best,  Zita De Guzman M.D.

## 2018-03-08 ENCOUNTER — OFFICE VISIT (OUTPATIENT)
Dept: MEDICAL GROUP | Facility: PHYSICIAN GROUP | Age: 63
End: 2018-03-08
Payer: COMMERCIAL

## 2018-03-08 VITALS
HEIGHT: 66 IN | WEIGHT: 189.6 LBS | RESPIRATION RATE: 14 BRPM | HEART RATE: 73 BPM | TEMPERATURE: 98.4 F | SYSTOLIC BLOOD PRESSURE: 132 MMHG | OXYGEN SATURATION: 95 % | BODY MASS INDEX: 30.47 KG/M2 | DIASTOLIC BLOOD PRESSURE: 78 MMHG

## 2018-03-08 DIAGNOSIS — E11.9 TYPE 2 DIABETES MELLITUS WITHOUT COMPLICATION, WITHOUT LONG-TERM CURRENT USE OF INSULIN (HCC): ICD-10-CM

## 2018-03-08 DIAGNOSIS — E06.3 HYPOTHYROIDISM DUE TO HASHIMOTO'S THYROIDITIS: ICD-10-CM

## 2018-03-08 DIAGNOSIS — E55.9 VITAMIN D DEFICIENCY: ICD-10-CM

## 2018-03-08 DIAGNOSIS — M85.80 OSTEOPENIA, UNSPECIFIED LOCATION: ICD-10-CM

## 2018-03-08 DIAGNOSIS — M25.561 CHRONIC PAIN OF RIGHT KNEE: ICD-10-CM

## 2018-03-08 DIAGNOSIS — E66.09 CLASS 1 OBESITY DUE TO EXCESS CALORIES WITHOUT SERIOUS COMORBIDITY WITH BODY MASS INDEX (BMI) OF 30.0 TO 30.9 IN ADULT: ICD-10-CM

## 2018-03-08 DIAGNOSIS — G89.29 CHRONIC PAIN OF RIGHT KNEE: ICD-10-CM

## 2018-03-08 DIAGNOSIS — I10 ESSENTIAL HYPERTENSION: ICD-10-CM

## 2018-03-08 DIAGNOSIS — Z23 NEED FOR VACCINATION: ICD-10-CM

## 2018-03-08 DIAGNOSIS — E03.8 HYPOTHYROIDISM DUE TO HASHIMOTO'S THYROIDITIS: ICD-10-CM

## 2018-03-08 DIAGNOSIS — K64.9 HEMORRHOIDS, UNSPECIFIED HEMORRHOID TYPE: ICD-10-CM

## 2018-03-08 DIAGNOSIS — N39.0 FREQUENT UTI: ICD-10-CM

## 2018-03-08 DIAGNOSIS — E78.2 MIXED HYPERLIPIDEMIA: ICD-10-CM

## 2018-03-08 PROCEDURE — 99396 PREV VISIT EST AGE 40-64: CPT | Mod: 25 | Performed by: INTERNAL MEDICINE

## 2018-03-08 PROCEDURE — 90471 IMMUNIZATION ADMIN: CPT | Performed by: INTERNAL MEDICINE

## 2018-03-08 PROCEDURE — 90732 PPSV23 VACC 2 YRS+ SUBQ/IM: CPT | Performed by: INTERNAL MEDICINE

## 2018-03-08 ASSESSMENT — PAIN SCALES - GENERAL: PAINLEVEL: NO PAIN

## 2018-03-08 ASSESSMENT — PATIENT HEALTH QUESTIONNAIRE - PHQ9: CLINICAL INTERPRETATION OF PHQ2 SCORE: 0

## 2018-03-09 NOTE — ASSESSMENT & PLAN NOTE
A1c slight worse 6.9. She is only on metformin 500 mg daily. No complications. She is on ace, asa, statin. She is up to date with retinal screening, we will try to get records. She is up to date . Microalbinuria,  cholesterol

## 2018-03-09 NOTE — ASSESSMENT & PLAN NOTE
TSH nl 03/2018. She is on levothyroxine 112 MCG daily. She denies constipation, loss, dry skin, brittle nails, intolerance to cold weather.

## 2018-03-09 NOTE — ASSESSMENT & PLAN NOTE
Counseling for a small portion, balanced diet, exercising for3-5 times per week. She is working with that. Small portion

## 2018-03-09 NOTE — PROGRESS NOTES
Subjective:   Christa Ramirez is a 62 y.o. female here today for physical, diabetes, htn     Hypertension  Blood pressure well controlled on lisinopril 5 mg daily. She denies chest pain, palpitation, headache, blurry vision, lightheadedness, leg swelling, orthopnea.    Hyperlipidemia  Last lipid panel 03/2018. Well managed, she is on crestor 40 mg daily. No side effects     Frequent UTI  She has history of frequent UTI. She has seen uorlogist. She takes cipro prn , when she feels that she is getting a UTI. Today. She denies dysuria, hematuria, flank pain    Hemorrhoid  Not bothering. She has seen GI. She is using steroid cream prn     Hypothyroid  TSH nl 03/2018. She is on levothyroxine 112 MCG daily. She denies constipation, loss, dry skin, brittle nails, intolerance to cold weather.    Type 2 diabetes mellitus without complication (CMS-[facility])  A1c slight worse 6.9. She is only on metformin 500 mg daily. No complications. She is on ace, asa, statin. She is up to date with retinal screening, we will try to get records. She is up to date . Microalbinuria,  cholesterol    Obesity  Counseling for a small portion, balanced diet, exercising for3-5 times per week. She is working with that. Small portion       Osteopenia  She tells me that she is in medication for osteoporosis, but she does not remember name. She used to be on prolia infusion, but it is expensive   DEXA Scan 2016:  FINDINGS:  lumbar spine T score of -2.3   The proximal left femur  T score of -1.3     When compared with the most recent study dated 1/9/2015, there has been a 1.6% increase in the bone mineral density of the lumbar spine and a 1.2% increase in the bone mineral density of the left femur.    Chronic pain of right knee  She has chronic bilateral knee, but the right is worse. She has seen orthopedic. She was told she needs knee replacement, but she is putting on hold.     Vitamin D deficiency  She started to take vitamin D supplement, I  advise to take vitamin D supplement 1000 U daily        Current medicines (including changes today)  Current Outpatient Prescriptions   Medication Sig Dispense Refill   • aspirin EC (ECOTRIN) 81 MG Tablet Delayed Response Take 1 Tab by mouth every day. 30 Tab    • lisinopril (PRINIVIL) 5 MG Tab TAKE ONE TABLET BY MOUTH ONCE DAILY (DISCONTINUE  HCTZ) 90 Tab 3   • rosuvastatin (CRESTOR) 40 MG tablet TAKE ONE TABLET BY MOUTH ONCE DAILY 90 Tab 3   • ciprofloxacin (CIPRO) 500 MG Tab TAKE ONE TABLET BY MOUTH TWICE DAILY 20 Tab 0   • raloxifene (EVISTA) 60 MG Tab Take 1 Tab by mouth every day. 30 Tab 11   • metformin (GLUCOPHAGE) 500 MG Tab TAKE ONE TABLET BY MOUTH ONCE DAILY IN THE EVENING WITH  DINNER  *OK  TO  TAKE  ONE-HALF  IF  YOU  HAVE  STOMACH  ISSUES* 90 Tab 1   • levothyroxine (SYNTHROID) 112 MCG Tab TAKE ONE TABLET BY MOUTH ONCE DAILY 90 Tab 3   • hydrocortisone rectal (PROCTOCREAM-HC) 2.5 % CREA Apply bid prn 1 Tube 3     No current facility-administered medications for this visit.      She  has a past medical history of Cancer (CMS-HCC); Diabetes (CMS-HCC); Frequent UTI; HEMORRHOIDS; Hyperlipidemia; Hypertension; Neoplasm of uncertain behavior of skin (2/28/2017); OSTEOPOROSIS; and Thyroid disease.    Current Outpatient Prescriptions   Medication Sig Dispense Refill   • aspirin EC (ECOTRIN) 81 MG Tablet Delayed Response Take 1 Tab by mouth every day. 30 Tab    • lisinopril (PRINIVIL) 5 MG Tab TAKE ONE TABLET BY MOUTH ONCE DAILY (DISCONTINUE  HCTZ) 90 Tab 3   • rosuvastatin (CRESTOR) 40 MG tablet TAKE ONE TABLET BY MOUTH ONCE DAILY 90 Tab 3   • ciprofloxacin (CIPRO) 500 MG Tab TAKE ONE TABLET BY MOUTH TWICE DAILY 20 Tab 0   • raloxifene (EVISTA) 60 MG Tab Take 1 Tab by mouth every day. 30 Tab 11   • metformin (GLUCOPHAGE) 500 MG Tab TAKE ONE TABLET BY MOUTH ONCE DAILY IN THE EVENING WITH  DINNER  *OK  TO  TAKE  ONE-HALF  IF  YOU  HAVE  STOMACH  ISSUES* 90 Tab 1   • levothyroxine (SYNTHROID) 112 MCG Tab TAKE ONE  "TABLET BY MOUTH ONCE DAILY 90 Tab 3   • hydrocortisone rectal (PROCTOCREAM-HC) 2.5 % CREA Apply bid prn 1 Tube 3     No current facility-administered medications for this visit.        Allergies as of 03/08/2018 - Reviewed 03/08/2018   Allergen Reaction Noted   • Boniva [ibandronic acid]  05/13/2016       Social History     Social History   • Marital status:      Spouse name: N/A   • Number of children: N/A   • Years of education: N/A     Occupational History   • Not on file.     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[employer]     Other Topics Concern   • Not on file     Social History Narrative   • No narrative on file        Family History   Problem Relation Age of Onset   • Heart Disease Mother    • Hyperlipidemia Brother    • Heart Disease Brother 65     MI   • Heart Disease Maternal Grandmother    • Hyperlipidemia Maternal Grandmother    • Cancer Neg Hx        Past Surgical History:   Procedure Laterality Date   • COLONOSCOPY  2007    recheck 5 years   • ABDOMINAL HYSTERECTOMY TOTAL      partial   • APPENDECTOMY     • BLADDER SUSPENSION     • KNEE ARTHROPLASTY TOTAL     • TONSILLECTOMY         ROS   All systems reviewed are negative except for HPI       Objective:     Blood pressure 132/78, pulse 73, temperature 36.9 °C (98.4 °F), resp. rate 14, height 1.676 m (5' 6\"), weight 86 kg (189 lb 9.5 oz), SpO2 95 %, not currently breastfeeding. Body mass index is 30.6 kg/m².   Physical Exam:  Constitutional: Alert, no distress.  Skin: Warm, dry, good turgor, no rashes in visible areas.  Eye: Equal, round and reactive, conjunctiva clear, lids normal.  ENMT: Lips without lesions, good dentition, oropharynx clear.  Neck: Trachea midline, no masses, no thyromegaly. No cervical or supraclavicular lymphadenopathy. Tympanic membranes clear  Respiratory: " Unlabored respiratory effort, lungs clear to auscultation, no wheezes, no ronchi.  Cardiovascular: Normal S1, S2, no murmur, no edema.  Abdomen: Soft, non-tender, no masses, no hepatosplenomegaly.  Psych: Alert and oriented x3, normal affect and mood.        Assessment and Plan:   The following treatment plan was discussed    1. Essential hypertension  Continue same treatment. Continue to monitor    2. Mixed hyperlipidemia  Continue same treatment. Continue to monitor    3. Frequent UTI  No active symptoms, cipro prn     4. Hemorrhoids, unspecified hemorrhoid type  Steroid cream prn. Well hydration, high fiber diet     5. Hypothyroidism due to Hashimoto's thyroiditis  Continue same treatment. Continue to monitor    6. Type 2 diabetes mellitus without complication, without long-term current use of insulin (CMS-[facility])  Continue same treatment. Continue to monitor    7. Class 1 obesity due to excess calories without serious comorbidity with body mass index (BMI) of 30.0 to 30.9 in adult  Counseling for a small portion, balanced diet, exercising for3-5 times per week.      8. Osteopenia, unspecified location  Continue same treatment. Continue to monitor    9. Chronic pain of right knee  Tylenol prn     10. Vitamin D deficiency  Continue supplement, continue to monitor     11. Need for vaccination  - PNEUMOCOCCAL POLYSACCHARIDE VACCINE 23-VALENT =>1YO SQ/IM      Followup: Return in about 1 year (around 3/8/2019), or if symptoms worsen or fail to improve, for Short.

## 2018-03-20 ENCOUNTER — OFFICE VISIT (OUTPATIENT)
Dept: URGENT CARE | Facility: CLINIC | Age: 63
End: 2018-03-20
Payer: COMMERCIAL

## 2018-03-20 VITALS
SYSTOLIC BLOOD PRESSURE: 130 MMHG | DIASTOLIC BLOOD PRESSURE: 74 MMHG | RESPIRATION RATE: 14 BRPM | HEART RATE: 80 BPM | HEIGHT: 66 IN | WEIGHT: 189 LBS | OXYGEN SATURATION: 97 % | TEMPERATURE: 98.6 F | BODY MASS INDEX: 30.37 KG/M2

## 2018-03-20 DIAGNOSIS — I88.9 LYMPHADENITIS: ICD-10-CM

## 2018-03-20 DIAGNOSIS — J02.9 PHARYNGITIS, UNSPECIFIED ETIOLOGY: ICD-10-CM

## 2018-03-20 PROCEDURE — 99214 OFFICE O/P EST MOD 30 MIN: CPT | Performed by: FAMILY MEDICINE

## 2018-03-20 RX ORDER — AMOXICILLIN 500 MG/1
500 CAPSULE ORAL 2 TIMES DAILY
Qty: 20 CAP | Refills: 0 | Status: SHIPPED | OUTPATIENT
Start: 2018-03-20 | End: 2018-03-30

## 2018-03-20 RX ORDER — IBUPROFEN 200 MG
200 TABLET ORAL EVERY 6 HOURS PRN
COMMUNITY
End: 2019-03-19

## 2018-03-26 ASSESSMENT — ENCOUNTER SYMPTOMS
HEADACHES: 0
SORE THROAT: 1

## 2018-03-26 NOTE — PROGRESS NOTES
"Subjective:   Christa Ramirez is a 62 y.o. female who presents for Otalgia (l neck lump x 3 days . )        Otalgia    There is pain in the left ear. This is a new problem. The current episode started in the past 7 days. The problem occurs constantly. The problem has been gradually worsening. There has been no fever. Associated symptoms include a sore throat. Pertinent negatives include no ear discharge, headaches or hearing loss.     Review of Systems   HENT: Positive for ear pain and sore throat. Negative for ear discharge and hearing loss.    Neurological: Negative for headaches.     Allergies   Allergen Reactions   • Boniva [Ibandronic Acid]      \"right side numbness\"   • Other Food      Walnuts .      Objective:   /74   Pulse 80   Temp 37 °C (98.6 °F)   Resp 14   Ht 1.676 m (5' 6\")   Wt 85.7 kg (189 lb)   SpO2 97%   BMI 30.51 kg/m²   Physical Exam   Constitutional: She is oriented to person, place, and time. She appears well-developed and well-nourished. No distress.   HENT:   Head: Normocephalic and atraumatic.   Right Ear: External ear normal.   Left Ear: External ear normal.   Mouth/Throat: Uvula is midline. Posterior oropharyngeal edema and posterior oropharyngeal erythema present. No oropharyngeal exudate or tonsillar abscesses.   Eyes: Conjunctivae and EOM are normal. Pupils are equal, round, and reactive to light.   Cardiovascular: Normal rate, regular rhythm, normal heart sounds and intact distal pulses.    No murmur heard.  Pulmonary/Chest: Effort normal and breath sounds normal. No respiratory distress.   Abdominal: Soft. Bowel sounds are normal. She exhibits no distension. There is no tenderness.   Musculoskeletal: Normal range of motion.   Lymphadenopathy:     She has cervical adenopathy.        Left cervical: Superficial cervical adenopathy present. No deep cervical and no posterior cervical adenopathy present.        Left: No supraclavicular adenopathy present.   Neurological: " She is alert and oriented to person, place, and time. She has normal reflexes. No sensory deficit.   Skin: Skin is warm and dry.   Psychiatric: She has a normal mood and affect. Her behavior is normal.         Assessment/Plan:   Assessment    1. Lymphadenitis  - amoxicillin (AMOXIL) 500 MG Cap; Take 1 Cap by mouth 2 times a day for 10 days.  Dispense: 20 Cap; Refill: 0    2. Pharyngitis, unspecified etiology  - amoxicillin (AMOXIL) 500 MG Cap; Take 1 Cap by mouth 2 times a day for 10 days.  Dispense: 20 Cap; Refill: 0  Differential diagnosis, natural history, supportive care, and indications for immediate follow-up discussed.

## 2018-04-13 RX ORDER — RALOXIFENE HYDROCHLORIDE 60 MG/1
60 TABLET, FILM COATED ORAL DAILY
Qty: 90 TAB | Refills: 3 | Status: SHIPPED | OUTPATIENT
Start: 2018-04-13 | End: 2019-07-08 | Stop reason: SDUPTHER

## 2018-04-27 DIAGNOSIS — E03.1 CONGENITAL HYPOTHYROIDISM: ICD-10-CM

## 2018-04-27 RX ORDER — LEVOTHYROXINE SODIUM 112 UG/1
TABLET ORAL
Qty: 90 TAB | Refills: 3 | Status: SHIPPED | OUTPATIENT
Start: 2018-04-27 | End: 2019-06-17 | Stop reason: SDUPTHER

## 2018-05-18 ENCOUNTER — APPOINTMENT (OUTPATIENT)
Dept: MEDICAL GROUP | Facility: PHYSICIAN GROUP | Age: 63
End: 2018-05-18

## 2018-06-06 ENCOUNTER — PATIENT MESSAGE (OUTPATIENT)
Dept: MEDICAL GROUP | Facility: PHYSICIAN GROUP | Age: 63
End: 2018-06-06

## 2018-06-06 RX ORDER — CIPROFLOXACIN 500 MG/1
TABLET, FILM COATED ORAL
Qty: 20 TAB | Refills: 0 | Status: SHIPPED | OUTPATIENT
Start: 2018-06-06 | End: 2019-03-19

## 2018-06-06 NOTE — TELEPHONE ENCOUNTER
----- Message from Kristin Boggs, Med Ass't sent at 6/6/2018  2:51 PM PDT -----  Regarding: FW: Prescription Question  Contact: [phone]      ----- Message -----  From: Christa Ramirez  Sent: 6/6/2018   2:42 PM  To: Jeff Mccarthy Ma  Subject: Prescription Question                            Hi Dr. De Guzman-  Leaving for my  trip next week for three weeks and was hoping to take a prescription of ciproflaxen with me.  I will be swimming and in hot tubs etc and do not want to be without it should a bladder infection start.  Hope your holiday was restful and fun!    Thanks, Christa Ramirez

## 2018-10-13 ENCOUNTER — OFFICE VISIT (OUTPATIENT)
Dept: URGENT CARE | Facility: CLINIC | Age: 63
End: 2018-10-13
Payer: COMMERCIAL

## 2018-10-13 VITALS
SYSTOLIC BLOOD PRESSURE: 162 MMHG | DIASTOLIC BLOOD PRESSURE: 110 MMHG | WEIGHT: 185 LBS | HEIGHT: 66 IN | TEMPERATURE: 97.5 F | BODY MASS INDEX: 29.73 KG/M2 | OXYGEN SATURATION: 96 % | HEART RATE: 76 BPM

## 2018-10-13 DIAGNOSIS — R03.0 TRANSIENT ELEVATED BLOOD PRESSURE: ICD-10-CM

## 2018-10-13 DIAGNOSIS — J00 NASOPHARYNGITIS: ICD-10-CM

## 2018-10-13 DIAGNOSIS — J40 BRONCHITIS: ICD-10-CM

## 2018-10-13 PROCEDURE — 99214 OFFICE O/P EST MOD 30 MIN: CPT | Performed by: PHYSICIAN ASSISTANT

## 2018-10-13 RX ORDER — PROMETHAZINE HYDROCHLORIDE AND CODEINE PHOSPHATE 6.25; 1 MG/5ML; MG/5ML
5-10 SYRUP ORAL 3 TIMES DAILY PRN
Qty: 150 ML | Refills: 0 | Status: SHIPPED | OUTPATIENT
Start: 2018-10-13 | End: 2018-10-18

## 2018-10-13 RX ORDER — CLONIDINE HYDROCHLORIDE 0.1 MG/1
0.1 TABLET ORAL ONCE
Status: COMPLETED | OUTPATIENT
Start: 2018-10-13 | End: 2018-10-13

## 2018-10-13 RX ORDER — METHYLPREDNISOLONE 4 MG/1
TABLET ORAL
Qty: 1 TAB | Refills: 0 | Status: SHIPPED | OUTPATIENT
Start: 2018-10-13 | End: 2019-03-19

## 2018-10-13 RX ORDER — CEFUROXIME AXETIL 500 MG/1
500 TABLET ORAL 2 TIMES DAILY
Qty: 20 TAB | Refills: 0 | Status: SHIPPED | OUTPATIENT
Start: 2018-10-13 | End: 2018-10-23

## 2018-10-13 RX ADMIN — CLONIDINE HYDROCHLORIDE 0.1 MG: 0.1 TABLET ORAL at 15:58

## 2018-10-13 ASSESSMENT — ENCOUNTER SYMPTOMS
COUGH: 1
SINUS PAIN: 1
SORE THROAT: 0
SHORTNESS OF BREATH: 0
RHINORRHEA: 1
FEVER: 0
HEMOPTYSIS: 0
CONSTITUTIONAL NEGATIVE: 1
EYES NEGATIVE: 1
CARDIOVASCULAR NEGATIVE: 1
MUSCULOSKELETAL NEGATIVE: 1
WHEEZING: 0

## 2018-10-13 NOTE — PROGRESS NOTES
"Subjective:      Christa Ramirez is a 63 y.o. female who presents with Congestion (x8 days. Productive cough, chest congestion, sinus congestion.)            Cough   This is a new problem. The current episode started in the past 7 days. The problem has been unchanged. The problem occurs every few minutes. The cough is productive of sputum. Associated symptoms include nasal congestion and rhinorrhea. Pertinent negatives include no fever, hemoptysis, sore throat, shortness of breath or wheezing. Nothing aggravates the symptoms. She has tried nothing for the symptoms. The treatment provided no relief. There is no history of asthma.       Review of Systems   Constitutional: Negative.  Negative for fever.   HENT: Positive for congestion, rhinorrhea and sinus pain. Negative for sore throat.         +phar./tons redn  +maxill.sinus press.     Eyes: Negative.    Respiratory: Positive for cough. Negative for hemoptysis, shortness of breath and wheezing.    Cardiovascular: Negative.    Musculoskeletal: Negative.    Skin: Negative.           Objective:     BP (!) 162/110   Pulse 76   Temp 36.4 °C (97.5 °F)   Ht 1.676 m (5' 6\")   Wt 83.9 kg (185 lb)   SpO2 96%   BMI 29.86 kg/m²      Physical Exam   Constitutional: She is oriented to person, place, and time. She appears well-developed and well-nourished. No distress.   HENT:   Head: Normocephalic and atraumatic.   Mouth/Throat: No oropharyngeal exudate.      +maxill.sinus press.     Eyes: Pupils are equal, round, and reactive to light. Conjunctivae and EOM are normal.   Neck: Normal range of motion. Neck supple.   Cardiovascular: Normal rate, regular rhythm and normal heart sounds.    Pulmonary/Chest: Effort normal and breath sounds normal. No respiratory distress. She has no wheezes. She has no rales.   Lymphadenopathy:     She has cervical adenopathy.   Neurological: She is alert and oriented to person, place, and time.   Skin: Skin is warm and dry.   Psychiatric: " She has a normal mood and affect. Her behavior is normal.   Nursing note and vitals reviewed.    Active Ambulatory Problems     Diagnosis Date Noted   • Hypertension    • Hyperlipidemia    • Frequent UTI    • Hemorrhoid    • Hypothyroid 11/18/2012   • Type 2 diabetes mellitus without complication (HCC) 11/18/2012   • Obesity 11/18/2012   • Osteopenia 02/16/2016   • Chronic pain of right knee 11/10/2017   • Vitamin D deficiency 11/10/2017     Resolved Ambulatory Problems     Diagnosis Date Noted   • OSTEOPOROSIS    • Thyroid nodule 11/28/2013   • Neoplasm of uncertain behavior of skin 02/28/2017   • Basal cell carcinoma of face 03/08/2017     Past Medical History:   Diagnosis Date   • Cancer (HCC)    • Diabetes (HCC)    • Frequent UTI    • HEMORRHOIDS    • Hyperlipidemia    • Hypertension    • Neoplasm of uncertain behavior of skin 2/28/2017   • OSTEOPOROSIS    • Thyroid disease      Current Outpatient Prescriptions on File Prior to Visit   Medication Sig Dispense Refill   • metFORMIN (GLUCOPHAGE) 500 MG Tab TAKE 1 TABLET BY MOUTH IN THE EVENING WITH DINNER (TAKE 1/2 IF YOU HAVE STOMACH ISSUES) 90 Tab 0   • levothyroxine (SYNTHROID) 112 MCG Tab TAKE ONE TABLET BY MOUTH ONCE DAILY 90 Tab 3   • raloxifene (EVISTA) 60 MG Tab Take 1 Tab by mouth every day. 90 Tab 3   • aspirin EC (ECOTRIN) 81 MG Tablet Delayed Response Take 1 Tab by mouth every day. 30 Tab    • lisinopril (PRINIVIL) 5 MG Tab TAKE ONE TABLET BY MOUTH ONCE DAILY (DISCONTINUE  HCTZ) 90 Tab 3   • rosuvastatin (CRESTOR) 40 MG tablet TAKE ONE TABLET BY MOUTH ONCE DAILY 90 Tab 3   • hydrocortisone rectal (PROCTOCREAM-HC) 2.5 % CREA Apply bid prn 1 Tube 3   • ciprofloxacin (CIPRO) 500 MG Tab TAKE ONE TABLET BY MOUTH TWICE DAILY 20 Tab 0   • ibuprofen (MOTRIN) 200 MG Tab Take 200 mg by mouth every 6 hours as needed.       No current facility-administered medications on file prior to visit.      Social History     Social History   • Marital status:       Spouse name: N/A   • Number of children: N/A   • Years of education: N/A     Occupational History   • Not on file.     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[employer]     Other Topics Concern   • Not on file     Social History Narrative   • No narrative on file     Family History   Problem Relation Age of Onset   • Heart Disease Mother    • Hyperlipidemia Brother    • Heart Disease Brother 65        MI   • Heart Disease Maternal Grandmother    • Hyperlipidemia Maternal Grandmother    • Cancer Neg Hx      Boniva [ibandronic acid] and Other food              Assessment/Plan:     ·  bronchitis/RAD  · Sinusitis  · elev BP          ·  pt has been taking lot of otc cold meds, decong; suspect is cause of high BP; no CP or neuro sx; ; nsaids; [hold rx for abx prn [conditional use] if sx persist/worsen]  ·  monitor BP; if still high RTC or f/u PCP

## 2019-01-02 ENCOUNTER — HOSPITAL ENCOUNTER (OUTPATIENT)
Dept: RADIOLOGY | Facility: MEDICAL CENTER | Age: 64
End: 2019-01-02
Attending: INTERNAL MEDICINE
Payer: COMMERCIAL

## 2019-01-02 DIAGNOSIS — Z12.31 VISIT FOR SCREENING MAMMOGRAM: ICD-10-CM

## 2019-01-02 PROCEDURE — 77063 BREAST TOMOSYNTHESIS BI: CPT

## 2019-02-11 DIAGNOSIS — E78.49 OTHER HYPERLIPIDEMIA: ICD-10-CM

## 2019-02-12 RX ORDER — ROSUVASTATIN CALCIUM 40 MG/1
TABLET, COATED ORAL
Qty: 90 TAB | Refills: 1 | Status: SHIPPED | OUTPATIENT
Start: 2019-02-12 | End: 2019-08-20 | Stop reason: SDUPTHER

## 2019-03-14 ENCOUNTER — APPOINTMENT (OUTPATIENT)
Dept: RADIOLOGY | Facility: IMAGING CENTER | Age: 64
End: 2019-03-14
Attending: PHYSICIAN ASSISTANT
Payer: COMMERCIAL

## 2019-03-14 ENCOUNTER — OFFICE VISIT (OUTPATIENT)
Dept: URGENT CARE | Facility: CLINIC | Age: 64
End: 2019-03-14
Payer: COMMERCIAL

## 2019-03-14 VITALS
TEMPERATURE: 96.9 F | SYSTOLIC BLOOD PRESSURE: 120 MMHG | HEART RATE: 81 BPM | WEIGHT: 185 LBS | OXYGEN SATURATION: 96 % | RESPIRATION RATE: 16 BRPM | HEIGHT: 66 IN | DIASTOLIC BLOOD PRESSURE: 70 MMHG | BODY MASS INDEX: 29.73 KG/M2

## 2019-03-14 DIAGNOSIS — R05.9 COUGH: ICD-10-CM

## 2019-03-14 DIAGNOSIS — J40 BRONCHITIS: ICD-10-CM

## 2019-03-14 PROCEDURE — 71046 X-RAY EXAM CHEST 2 VIEWS: CPT | Mod: TC | Performed by: PHYSICIAN ASSISTANT

## 2019-03-14 PROCEDURE — 99214 OFFICE O/P EST MOD 30 MIN: CPT | Performed by: PHYSICIAN ASSISTANT

## 2019-03-14 RX ORDER — PROMETHAZINE HYDROCHLORIDE AND CODEINE PHOSPHATE 6.25; 1 MG/5ML; MG/5ML
5 SYRUP ORAL 4 TIMES DAILY PRN
Qty: 100 ML | Refills: 0 | Status: SHIPPED | OUTPATIENT
Start: 2019-03-14 | End: 2019-03-19

## 2019-03-14 RX ORDER — AZITHROMYCIN 250 MG/1
TABLET, FILM COATED ORAL
Qty: 6 TAB | Refills: 0 | Status: SHIPPED | OUTPATIENT
Start: 2019-03-14 | End: 2019-03-19

## 2019-03-14 ASSESSMENT — ENCOUNTER SYMPTOMS
EYE DISCHARGE: 0
EYE PAIN: 0
MYALGIAS: 0
SINUS PAIN: 0
CHILLS: 0
SORE THROAT: 0
HEMOPTYSIS: 0
FEVER: 0
SHORTNESS OF BREATH: 0
SPUTUM PRODUCTION: 1
NAUSEA: 0
COUGH: 1
CONSTIPATION: 0
ABDOMINAL PAIN: 0
DIARRHEA: 0
HEADACHES: 0
VOMITING: 0

## 2019-03-19 ENCOUNTER — OFFICE VISIT (OUTPATIENT)
Dept: MEDICAL GROUP | Facility: PHYSICIAN GROUP | Age: 64
End: 2019-03-19
Payer: COMMERCIAL

## 2019-03-19 VITALS
DIASTOLIC BLOOD PRESSURE: 82 MMHG | HEART RATE: 81 BPM | TEMPERATURE: 97.3 F | OXYGEN SATURATION: 97 % | WEIGHT: 196 LBS | SYSTOLIC BLOOD PRESSURE: 126 MMHG | RESPIRATION RATE: 14 BRPM | HEIGHT: 66 IN | BODY MASS INDEX: 31.5 KG/M2

## 2019-03-19 DIAGNOSIS — K64.9 HEMORRHOIDS, UNSPECIFIED HEMORRHOID TYPE: ICD-10-CM

## 2019-03-19 DIAGNOSIS — Z12.11 SCREENING FOR COLORECTAL CANCER: ICD-10-CM

## 2019-03-19 DIAGNOSIS — E55.9 VITAMIN D DEFICIENCY: ICD-10-CM

## 2019-03-19 DIAGNOSIS — E66.09 CLASS 1 OBESITY DUE TO EXCESS CALORIES WITHOUT SERIOUS COMORBIDITY WITH BODY MASS INDEX (BMI) OF 30.0 TO 30.9 IN ADULT: ICD-10-CM

## 2019-03-19 DIAGNOSIS — E78.2 MIXED HYPERLIPIDEMIA: ICD-10-CM

## 2019-03-19 DIAGNOSIS — E03.8 HYPOTHYROIDISM DUE TO HASHIMOTO'S THYROIDITIS: ICD-10-CM

## 2019-03-19 DIAGNOSIS — E06.3 HYPOTHYROIDISM DUE TO HASHIMOTO'S THYROIDITIS: ICD-10-CM

## 2019-03-19 DIAGNOSIS — E11.9 TYPE 2 DIABETES MELLITUS WITHOUT COMPLICATION, WITHOUT LONG-TERM CURRENT USE OF INSULIN (HCC): ICD-10-CM

## 2019-03-19 DIAGNOSIS — I10 ESSENTIAL HYPERTENSION: ICD-10-CM

## 2019-03-19 DIAGNOSIS — Z12.12 SCREENING FOR COLORECTAL CANCER: ICD-10-CM

## 2019-03-19 PROCEDURE — 99396 PREV VISIT EST AGE 40-64: CPT | Performed by: INTERNAL MEDICINE

## 2019-03-19 ASSESSMENT — PATIENT HEALTH QUESTIONNAIRE - PHQ9: CLINICAL INTERPRETATION OF PHQ2 SCORE: 0

## 2019-03-20 NOTE — PROGRESS NOTES
Subjective:   Christa Ramirez is a 63 y.o. female here today for diabetes, hypertension, physical     61-year-old female past medical history of hypothyroidism, type 2 diabetes, hypertension, hyperlipidemia presented as routine follow-up for physical.  Patient has no active complaints.  She has gained a few pounds due to weather, not being active. She is feeling well. BP well controled on lisinopril 5 mg daily.  Denies chest pain, shortness of breath, leg swelling, blurry vision lightheadedness.  Diabetes has been well managed in the past.  There is no complication from diabetes.  She is on metformin 500 mg twice daily.  She is on statin, aspirin 81 mg daily.  Monofilament testing with a 10 gram force: sensation intact: intact bilaterally  Visual Inspection: Feet without maceration, ulcers, fissures.  Pedal pulses: intact bilaterally    She is due for colon cancer screening. colo guard ordered.     Current medicines (including changes today)  Current Outpatient Prescriptions   Medication Sig Dispense Refill   • hydrocortisone rectal (PROCTOCREAM-HC) 2.5 % Cream Apply bid prn 1 Tube 3   • rosuvastatin (CRESTOR) 40 MG tablet TAKE 1 TABLET BY MOUTH ONCE DAILY 90 Tab 1   • metFORMIN (GLUCOPHAGE) 500 MG Tab TAKE 1 TABLET BY MOUTH ONCE DAILY IN THE EVENING WITH  DINNER(TAKE ONE-HALF TAB IF YOU HAVE STOMACH ISSUES) 90 Tab 1   • levothyroxine (SYNTHROID) 112 MCG Tab TAKE ONE TABLET BY MOUTH ONCE DAILY 90 Tab 3   • raloxifene (EVISTA) 60 MG Tab Take 1 Tab by mouth every day. 90 Tab 3   • aspirin EC (ECOTRIN) 81 MG Tablet Delayed Response Take 1 Tab by mouth every day. 30 Tab    • lisinopril (PRINIVIL) 5 MG Tab TAKE ONE TABLET BY MOUTH ONCE DAILY (DISCONTINUE  HCTZ) 90 Tab 3     No current facility-administered medications for this visit.      She  has a past medical history of Cancer (HCC); Diabetes (HCC); Frequent UTI; HEMORRHOIDS; Hyperlipidemia; Hypertension; Neoplasm of uncertain behavior of skin (2/28/2017);  OSTEOPOROSIS; and Thyroid disease.    Current Outpatient Prescriptions   Medication Sig Dispense Refill   • hydrocortisone rectal (PROCTOCREAM-HC) 2.5 % Cream Apply bid prn 1 Tube 3   • rosuvastatin (CRESTOR) 40 MG tablet TAKE 1 TABLET BY MOUTH ONCE DAILY 90 Tab 1   • metFORMIN (GLUCOPHAGE) 500 MG Tab TAKE 1 TABLET BY MOUTH ONCE DAILY IN THE EVENING WITH  DINNER(TAKE ONE-HALF TAB IF YOU HAVE STOMACH ISSUES) 90 Tab 1   • levothyroxine (SYNTHROID) 112 MCG Tab TAKE ONE TABLET BY MOUTH ONCE DAILY 90 Tab 3   • raloxifene (EVISTA) 60 MG Tab Take 1 Tab by mouth every day. 90 Tab 3   • aspirin EC (ECOTRIN) 81 MG Tablet Delayed Response Take 1 Tab by mouth every day. 30 Tab    • lisinopril (PRINIVIL) 5 MG Tab TAKE ONE TABLET BY MOUTH ONCE DAILY (DISCONTINUE  HCTZ) 90 Tab 3     No current facility-administered medications for this visit.        Allergies as of 03/19/2019 - Reviewed 03/19/2019   Allergen Reaction Noted   • Boniva [ibandronic acid]  05/13/2016   • Other food  03/20/2018       Social History     Social History   • Marital status:      Spouse name: N/A   • Number of children: N/A   • Years of education: N/A     Occupational History   • Not on file.     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[employer]     Other Topics Concern   • Not on file     Social History Narrative   • No narrative on file        Family History   Problem Relation Age of Onset   • Heart Disease Mother    • Hyperlipidemia Brother    • Heart Disease Brother 65        MI   • Heart Disease Maternal Grandmother    • Hyperlipidemia Maternal Grandmother    • Cancer Neg Hx        Past Surgical History:   Procedure Laterality Date   • COLONOSCOPY  2007    recheck 5 years   • ABDOMINAL HYSTERECTOMY TOTAL      partial   • APPENDECTOMY     • BLADDER SUSPENSION     • KNEE ARTHROPLASTY TOTAL    "  • TONSILLECTOMY         ROS   All systems reviewed are negative except for HPI       Objective:     Blood pressure 126/82, pulse 81, temperature 36.3 °C (97.3 °F), temperature source Temporal, resp. rate 14, height 1.676 m (5' 6\"), weight 88.9 kg (196 lb), SpO2 97 %, not currently breastfeeding. Body mass index is 31.64 kg/m².   Physical Exam:  Constitutional: Alert, no distress.  Skin: Warm, dry, good turgor, no rashes in visible areas.  Eye: Equal, round and reactive, conjunctiva clear, lids normal.  ENMT: Lips without lesions, good dentition, oropharynx clear.  Neck: Trachea midline, no masses, no thyromegaly. No cervical or supraclavicular lymphadenopathy  Respiratory: Unlabored respiratory effort, lungs clear to auscultation, no wheezes, no ronchi.  Cardiovascular: Normal S1, S2, no murmur, no edema.  Abdomen: Soft, non-tender, no masses, no hepatosplenomegaly.  Psych: Alert and oriented x3, normal affect and mood.  Monofilament as per above       Assessment and Plan:   The following treatment plan was discussed    1. Hemorrhoids, unspecified hemorrhoid type  Refill provided on the cream. She is doing well  - hydrocortisone rectal (PROCTOCREAM-HC) 2.5 % Cream; Apply bid prn  Dispense: 1 Tube; Refill: 3    2. Screening for colorectal cancer  - COLOGUARD (FIT DNA)    3. Mixed hyperlipidemia  Continue same treatment, continue to monitor   - Lipid Profile; Future    4. Essential hypertension  Continue same treatment, continue to monitor   - Lipid Profile; Future    5. Hypothyroidism due to Hashimoto's thyroiditis  Continue same treatment, continue to monitor   - CBC WITH DIFFERENTIAL; Future  - TSH; Future    6. Class 1 obesity due to excess calories without serious comorbidity with body mass index (BMI) of 30.0 to 30.9 in adult  Counseling for a small portion, balanced diet, exercising for3-5 times per week.    7. Type 2 diabetes mellitus without complication, without long-term current use of insulin " (HCC)  Continue same treatment, continue to monitor   - HEMOGLOBIN A1C; Future  - Comp Metabolic Panel; Future  - MICROALBUMIN CREAT RATIO URINE; Future  - Diabetic Monofilament Lower Extremity Exam    8. Vitamin D deficiency  Continue supplement, continue to monitor   - VITAMIN D,25 HYDROXY; Future      Followup: Return in about 1 year (around 3/19/2020), or if symptoms worsen or fail to improve, for Short.

## 2019-03-25 DIAGNOSIS — I10 ESSENTIAL HYPERTENSION: ICD-10-CM

## 2019-03-26 RX ORDER — LISINOPRIL 5 MG/1
TABLET ORAL
Qty: 90 TAB | Refills: 3 | Status: SHIPPED | OUTPATIENT
Start: 2019-03-26 | End: 2020-01-02 | Stop reason: SDUPTHER

## 2019-03-29 ENCOUNTER — HOSPITAL ENCOUNTER (OUTPATIENT)
Dept: LAB | Facility: MEDICAL CENTER | Age: 64
End: 2019-03-29
Attending: INTERNAL MEDICINE
Payer: COMMERCIAL

## 2019-03-29 DIAGNOSIS — E11.9 TYPE 2 DIABETES MELLITUS WITHOUT COMPLICATION, WITHOUT LONG-TERM CURRENT USE OF INSULIN (HCC): ICD-10-CM

## 2019-03-29 DIAGNOSIS — E06.3 HYPOTHYROIDISM DUE TO HASHIMOTO'S THYROIDITIS: ICD-10-CM

## 2019-03-29 DIAGNOSIS — E78.2 MIXED HYPERLIPIDEMIA: ICD-10-CM

## 2019-03-29 DIAGNOSIS — I10 ESSENTIAL HYPERTENSION: ICD-10-CM

## 2019-03-29 DIAGNOSIS — E55.9 VITAMIN D DEFICIENCY: ICD-10-CM

## 2019-03-29 DIAGNOSIS — E03.8 HYPOTHYROIDISM DUE TO HASHIMOTO'S THYROIDITIS: ICD-10-CM

## 2019-03-29 LAB
25(OH)D3 SERPL-MCNC: 25 NG/ML (ref 30–100)
ALBUMIN SERPL BCP-MCNC: 4.2 G/DL (ref 3.2–4.9)
ALBUMIN/GLOB SERPL: 1.3 G/DL
ALP SERPL-CCNC: 54 U/L (ref 30–99)
ALT SERPL-CCNC: 22 U/L (ref 2–50)
ANION GAP SERPL CALC-SCNC: 7 MMOL/L (ref 0–11.9)
AST SERPL-CCNC: 19 U/L (ref 12–45)
BASOPHILS # BLD AUTO: 0.6 % (ref 0–1.8)
BASOPHILS # BLD: 0.04 K/UL (ref 0–0.12)
BILIRUB SERPL-MCNC: 0.4 MG/DL (ref 0.1–1.5)
BUN SERPL-MCNC: 15 MG/DL (ref 8–22)
CALCIUM SERPL-MCNC: 9.1 MG/DL (ref 8.5–10.5)
CHLORIDE SERPL-SCNC: 105 MMOL/L (ref 96–112)
CHOLEST SERPL-MCNC: 187 MG/DL (ref 100–199)
CO2 SERPL-SCNC: 26 MMOL/L (ref 20–33)
CREAT SERPL-MCNC: 0.71 MG/DL (ref 0.5–1.4)
CREAT UR-MCNC: 129.8 MG/DL
EOSINOPHIL # BLD AUTO: 0.19 K/UL (ref 0–0.51)
EOSINOPHIL NFR BLD: 2.9 % (ref 0–6.9)
ERYTHROCYTE [DISTWIDTH] IN BLOOD BY AUTOMATED COUNT: 50.5 FL (ref 35.9–50)
EST. AVERAGE GLUCOSE BLD GHB EST-MCNC: 177 MG/DL
FASTING STATUS PATIENT QL REPORTED: NORMAL
GLOBULIN SER CALC-MCNC: 3.2 G/DL (ref 1.9–3.5)
GLUCOSE SERPL-MCNC: 145 MG/DL (ref 65–99)
HBA1C MFR BLD: 7.8 % (ref 0–5.6)
HCT VFR BLD AUTO: 45.2 % (ref 37–47)
HDLC SERPL-MCNC: 47 MG/DL
HGB BLD-MCNC: 13.7 G/DL (ref 12–16)
IMM GRANULOCYTES # BLD AUTO: 0.01 K/UL (ref 0–0.11)
IMM GRANULOCYTES NFR BLD AUTO: 0.2 % (ref 0–0.9)
LDLC SERPL CALC-MCNC: 106 MG/DL
LYMPHOCYTES # BLD AUTO: 1.72 K/UL (ref 1–4.8)
LYMPHOCYTES NFR BLD: 26.7 % (ref 22–41)
MCH RBC QN AUTO: 27.4 PG (ref 27–33)
MCHC RBC AUTO-ENTMCNC: 30.3 G/DL (ref 33.6–35)
MCV RBC AUTO: 90.4 FL (ref 81.4–97.8)
MICROALBUMIN UR-MCNC: 1.6 MG/DL
MICROALBUMIN/CREAT UR: 12 MG/G (ref 0–30)
MONOCYTES # BLD AUTO: 0.42 K/UL (ref 0–0.85)
MONOCYTES NFR BLD AUTO: 6.5 % (ref 0–13.4)
NEUTROPHILS # BLD AUTO: 4.07 K/UL (ref 2–7.15)
NEUTROPHILS NFR BLD: 63.1 % (ref 44–72)
NRBC # BLD AUTO: 0 K/UL
NRBC BLD-RTO: 0 /100 WBC
PLATELET # BLD AUTO: 342 K/UL (ref 164–446)
PMV BLD AUTO: 9.1 FL (ref 9–12.9)
POTASSIUM SERPL-SCNC: 4 MMOL/L (ref 3.6–5.5)
PROT SERPL-MCNC: 7.4 G/DL (ref 6–8.2)
RBC # BLD AUTO: 5 M/UL (ref 4.2–5.4)
SODIUM SERPL-SCNC: 138 MMOL/L (ref 135–145)
TRIGL SERPL-MCNC: 170 MG/DL (ref 0–149)
TSH SERPL DL<=0.005 MIU/L-ACNC: 2.68 UIU/ML (ref 0.38–5.33)
WBC # BLD AUTO: 6.5 K/UL (ref 4.8–10.8)

## 2019-03-29 PROCEDURE — 82306 VITAMIN D 25 HYDROXY: CPT

## 2019-03-29 PROCEDURE — 80061 LIPID PANEL: CPT

## 2019-03-29 PROCEDURE — 83036 HEMOGLOBIN GLYCOSYLATED A1C: CPT

## 2019-03-29 PROCEDURE — 84443 ASSAY THYROID STIM HORMONE: CPT

## 2019-03-29 PROCEDURE — 82043 UR ALBUMIN QUANTITATIVE: CPT

## 2019-03-29 PROCEDURE — 80053 COMPREHEN METABOLIC PANEL: CPT

## 2019-03-29 PROCEDURE — 82570 ASSAY OF URINE CREATININE: CPT

## 2019-03-29 PROCEDURE — 36415 COLL VENOUS BLD VENIPUNCTURE: CPT

## 2019-03-29 PROCEDURE — 85025 COMPLETE CBC W/AUTO DIFF WBC: CPT

## 2019-04-02 ENCOUNTER — TELEPHONE (OUTPATIENT)
Dept: MEDICAL GROUP | Facility: PHYSICIAN GROUP | Age: 64
End: 2019-04-02

## 2019-04-02 NOTE — TELEPHONE ENCOUNTER
----- Message from Zita De Guzman M.D. sent at 4/2/2019  8:24 AM PDT -----  Please let patient know that diabetes is slightly worse. Also cholesterol is slight elevated. Please advise healthy diet, low carb diet. She can schedule another apt to adjust medications, or she can try conservative management and reevaluate with Dr. Cota at her next apt. Vitamin D is slight low. Please advise pt to take vitamin D supplement 2000 U daily.  Please let me know if patient has any other questions  Thank you  Zita De Guzman M.D.

## 2019-04-02 NOTE — TELEPHONE ENCOUNTER
VOICEMAIL  1. Caller Name: Christa Ramirez                        Call Back Number: [phone] (home)       2. Message: Called and left patient a message to call back to get lab results. LM     3. Patient approves office to leave a detailed voicemail/MyChart message: N\A

## 2019-04-03 NOTE — TELEPHONE ENCOUNTER
Phone Number Called: [phone] (home)       Message: Called and left patient a message. LM     Left Message for patient to call back: yes

## 2019-04-04 ENCOUNTER — PATIENT MESSAGE (OUTPATIENT)
Dept: MEDICAL GROUP | Facility: PHYSICIAN GROUP | Age: 64
End: 2019-04-04

## 2019-04-04 DIAGNOSIS — R19.5 POSITIVE COLORECTAL CANCER SCREENING USING COLOGUARD TEST: ICD-10-CM

## 2019-06-17 DIAGNOSIS — E03.1 CONGENITAL HYPOTHYROIDISM: ICD-10-CM

## 2019-06-17 RX ORDER — LEVOTHYROXINE SODIUM 112 UG/1
TABLET ORAL
Qty: 90 TAB | Refills: 0 | Status: SHIPPED | OUTPATIENT
Start: 2019-06-17 | End: 2019-09-17 | Stop reason: SDUPTHER

## 2019-06-28 ENCOUNTER — APPOINTMENT (OUTPATIENT)
Dept: MEDICAL GROUP | Facility: PHYSICIAN GROUP | Age: 64
End: 2019-06-28
Payer: COMMERCIAL

## 2019-06-28 ENCOUNTER — OFFICE VISIT (OUTPATIENT)
Dept: MEDICAL GROUP | Facility: PHYSICIAN GROUP | Age: 64
End: 2019-06-28
Payer: COMMERCIAL

## 2019-06-28 VITALS
RESPIRATION RATE: 16 BRPM | BODY MASS INDEX: 31.66 KG/M2 | HEART RATE: 78 BPM | TEMPERATURE: 97.9 F | SYSTOLIC BLOOD PRESSURE: 146 MMHG | WEIGHT: 197 LBS | OXYGEN SATURATION: 93 % | HEIGHT: 66 IN | DIASTOLIC BLOOD PRESSURE: 90 MMHG

## 2019-06-28 DIAGNOSIS — E03.8 HYPOTHYROIDISM DUE TO HASHIMOTO'S THYROIDITIS: ICD-10-CM

## 2019-06-28 DIAGNOSIS — E66.9 OBESITY (BMI 30-39.9): ICD-10-CM

## 2019-06-28 DIAGNOSIS — E06.3 HYPOTHYROIDISM DUE TO HASHIMOTO'S THYROIDITIS: ICD-10-CM

## 2019-06-28 DIAGNOSIS — I10 ESSENTIAL HYPERTENSION: Primary | ICD-10-CM

## 2019-06-28 DIAGNOSIS — E11.9 TYPE 2 DIABETES MELLITUS WITHOUT COMPLICATION, WITHOUT LONG-TERM CURRENT USE OF INSULIN (HCC): ICD-10-CM

## 2019-06-28 DIAGNOSIS — Z11.59 NEED FOR HEPATITIS C SCREENING TEST: ICD-10-CM

## 2019-06-28 DIAGNOSIS — Z23 NEED FOR VACCINATION: ICD-10-CM

## 2019-06-28 DIAGNOSIS — N39.0 FREQUENT UTI: ICD-10-CM

## 2019-06-28 LAB
HBA1C MFR BLD: 7.5 % (ref 0–5.6)
INT CON NEG: NEGATIVE
INT CON POS: POSITIVE

## 2019-06-28 PROCEDURE — 99214 OFFICE O/P EST MOD 30 MIN: CPT | Mod: 25 | Performed by: FAMILY MEDICINE

## 2019-06-28 PROCEDURE — 90746 HEPB VACCINE 3 DOSE ADULT IM: CPT | Performed by: FAMILY MEDICINE

## 2019-06-28 PROCEDURE — 83036 HEMOGLOBIN GLYCOSYLATED A1C: CPT | Performed by: FAMILY MEDICINE

## 2019-06-28 PROCEDURE — 90471 IMMUNIZATION ADMIN: CPT | Performed by: FAMILY MEDICINE

## 2019-06-28 NOTE — PROGRESS NOTES
Subjective:     CC:  The primary encounter diagnosis was Essential hypertension. Diagnoses of Type 2 diabetes mellitus without complication, without long-term current use of insulin (HCC), Frequent UTI, Hypothyroidism due to Hashimoto's thyroiditis, Obesity (BMI 30-39.9), Need for hepatitis C screening test, and Need for vaccination were also pertinent to this visit.    HISTORY OF THE PRESENT ILLNESS: Patient is a 64 y.o. female. This pleasant patient is here today to establish care. Her prior PCP was Zita De Guzman MD.    Hypertension  This is a chronic condition.  Current Meds: lisinopril 5 qd  Side effects: none  Home BP Los/70s  Associated symptoms: no cp, no sob, no headaches      Type 2 diabetes mellitus without complication (CMS-HCC) (HCC)  This is a chronic condition.  Current medications: metformin 500 qd  Last A1c: 7.8% (3/2019)  Last diabetic foot exam: 3/2019  Last retinal eye exam: 10/2018 - requesting records  ACEi/ARB: lisinopril  Statin: rosuvastatin   Aspirin: yes  Concomitant HTN: yes - not at goal today  Nightly foot checks: yes      Frequent UTI  This is a chronic condition. She gets frequent UTI and her last UTI was ~6 months ago. She did see urology who told her that she gets UTIs because she has incomplete emptying of the bladder.    Hypothyroid  This is a chronic condition. She has been on medication for ~20 years. Her last TSH in 3/2019 was normal.     Obesity (BMI 30-39.9)  This is a chronic condition.  Weight change: stable  Diet: avoiding processed flours/grains, limiting sugars, working on portion control  Exercise: limited due to knee pain but does walk daily 30 minutes        Allergies: Other food and Boniva [ibandronic acid]    Current Outpatient Prescriptions Ordered in [facility]   Medication Sig Dispense Refill   • CALCIUM-VITAMIN D PO Take  by mouth.     • Multiple Vitamins-Minerals (MULTIVITAMIN PO) Take  by mouth.     • metFORMIN (GLUCOPHAGE) 500 MG Tab Take 1 Tab by mouth 2 times  a day, with meals. 180 Tab 0   • levothyroxine (SYNTHROID) 112 MCG Tab TAKE 1 TABLET BY MOUTH ONCE DAILY 90 Tab 0   • lisinopril (PRINIVIL) 5 MG Tab TAKE 1 TABLET BY MOUTH ONCE DAILY(DISCONTINUE HCTZ) (Patient taking differently: TAKE 1 TABLET BY MOUTH ONCE DAILY) 90 Tab 3   • rosuvastatin (CRESTOR) 40 MG tablet TAKE 1 TABLET BY MOUTH ONCE DAILY 90 Tab 1   • raloxifene (EVISTA) 60 MG Tab Take 1 Tab by mouth every day. 90 Tab 3   • aspirin EC (ECOTRIN) 81 MG Tablet Delayed Response Take 1 Tab by mouth every day. 30 Tab    • hydrocortisone rectal (PROCTOCREAM-HC) 2.5 % Cream Apply bid prn 1 Tube 3     No current Epic-ordered facility-administered medications on file.        Past Medical History:   Diagnosis Date   • Cancer (HCC)    • Diabetes (HCC)    • Frequent UTI    • HEMORRHOIDS    • Hyperlipidemia    • Hypertension    • Neoplasm of uncertain behavior of skin 2/28/2017   • OSTEOPOROSIS    • Thyroid disease        Past Surgical History:   Procedure Laterality Date   • COLONOSCOPY  2007    recheck 5 years   • ABDOMINAL HYSTERECTOMY TOTAL      partial   • APPENDECTOMY     • BLADDER SUSPENSION     • KNEE ARTHROPLASTY TOTAL     • TONSILLECTOMY         Social History   Substance Use Topics   • Smoking status: Never Smoker   • Smokeless tobacco: Never Used   • Alcohol use No       Social History     Social History Narrative   • No narrative on file       Family History   Problem Relation Age of Onset   • Heart Disease Mother    • Hyperlipidemia Brother    • Heart Disease Brother 65        MI   • Heart Disease Maternal Grandmother    • Hyperlipidemia Maternal Grandmother    • Cancer Neg Hx        Health Maintenance: Completed    ROS:   Gen: no fevers/chills, no changes in weight  Eyes: no changes in vision  ENT: no sore throat  Pulm: no sob  CV: no chest pain  GI: no nausea/vomiting  : no dysuria  MSk: + myalgias - occasional  Skin: no rash  Neuro: no headaches      Objective:     Exam: /90 (BP Location: Right  "arm, Patient Position: Sitting, BP Cuff Size: Adult)   Pulse 78   Temp 36.6 °C (97.9 °F) (Temporal)   Resp 16   Ht 1.676 m (5' 6\")   Wt 89.4 kg (197 lb)   SpO2 93%  Body mass index is 31.8 kg/m².    General: Normal appearing. No distress.  HEENT: Normocephalic. Eyes conjunctiva clear lids without ptosis, pupils equal and reactive to light accommodation, ears normal shape and contour, right canal clear and tympanic membrane normal, left canal impacted with cerumen, oropharynx is without erythema, edema or exudates.   Neck: Supple without JVD. Thyroid is not enlarged.  Pulmonary: Clear to ausculation.  Normal effort. No rales, ronchi, or wheezing.  Cardiovascular: Regular rate and rhythm without murmur. Carotid and radial pulses are intact and equal bilaterally.  Abdomen: Soft, nontender, nondistended. Normal bowel sounds. Liver and spleen are not palpable  Neurologic: Grossly nonfocal  Lymph: No cervical or supraclavicular lymph nodes are palpable  Skin: Warm and dry.  No obvious lesions.  Musculoskeletal: Normal gait. No extremity cyanosis, clubbing, or edema.  Psych: Normal mood and affect. Alert and oriented x3. Judgment and insight is normal.    Assessment & Plan:   64 y.o. female with the following -    1. Essential hypertension  This is a chronic condition, possibly uncontrolled.  She is on lisinopril and tolerating it well without any side effect.  She reports that her home blood pressures generally are in the 130s/70s range.  Her blood pressure was initially 142/88.  Repeat at the end of the visit was 146/90.  She is under some increased stress this morning as she has been awake and taking care of things since 5 AM because she is going to go  her daughter from college that is 8 hours away.  -Continue lisinopril 5 mg daily  -Monitor at next visit    2. Type 2 diabetes mellitus without complication, without long-term current use of insulin (HCC)  This is a chronic condition, uncontrolled.  Her last " hemoglobin A1c in March, 219 was 7.8%.  Today it is 7.5%.  She is only on metformin 500 once daily her diabetic foot exam is up-to-date.  She reports her retinal eye exam is up-to-date and we are requesting records.  - POCT Hemoglobin A1C  -Increase metformin to 500 mill grams twice daily  -Recheck A1c at next visit    3. Frequent UTI  This is a chronic condition, stable.  She reports a long-standing history of frequent UTIs.  She states that her prior PCP would have a standing prescription of ciprofloxacin available if she got the symptoms for UTI.  She states that her last UTI was approximately 6 months ago and that this past year is been the best year for UTIs.  She did see a urologist in the past who told her that she gets frequent UTIs because she has incomplete emptying of the bladder.    4. Hypothyroidism due to Hashimoto's thyroiditis  This is a chronic condition, controlled.  She has a history of hypothyroidism for approximately 20 years.  She currently is on levothyroxine 112 mcg daily and tolerating it well.  Her last TSH in March, 2018 was within normal limits.  -Continue levothyroxine 112 micro grams daily    5. Obesity (BMI 30-39.9)  This is a chronic condition, stable.  We discussed healthy diet and exercise extensively.  She is already working on the right changes-avoiding processed flour/grains, avoiding sugars, and working on portion control.  She does walk 30 minutes daily but does have any pain so it is somewhat limiting.  She reports that she has seen the health improvement program in the past.  - Patient identified as having weight management issue.  Appropriate orders and counseling given.    6. Need for hepatitis C screening test  - HEP C VIRUS ANTIBODY; Future    7. Need for vaccination  - Hepatitis B Vaccine Adult IM    Return in about 3 months (around 9/28/2019) for f/u DM, get A1c.    Please note that this dictation was created using voice recognition software. I have made every  reasonable attempt to correct obvious errors, but I expect that there are errors of grammar and possibly content that I did not discover before finalizing the note.

## 2019-06-28 NOTE — ASSESSMENT & PLAN NOTE
This is a chronic condition.  Current Meds: lisinopril 5 qd  Side effects: none  Home BP Los/70s  Associated symptoms: no cp, no sob, no headaches

## 2019-06-28 NOTE — LETTER
[facility]  Arlin Cota M.D.  [address] [place] [address]  Fax: [phone]   Authorization for Release/Disclosure of   Protected Health Information   Name: RADHA MCQUEEN : 1955 SSN: [id number]   Address: [facility] [place] [address] Phone:    [phone] (home)    I authorize the entity listed below to release/disclose the PHI below to:   [facility]/Arlin Cota M.D. and Arlin Cota M.D.   Provider or Entity Name:  [facility]    Address   City, [facility], [facility] [place]  Phone:      Fax:     Reason for request: continuity of care   Information to be released:    [  ] LAST COLONOSCOPY,  including any PATH REPORT and follow-up  [  ] LAST FIT/COLOGUARD RESULT [  ] LAST DEXA  [  ] LAST MAMMOGRAM  [  ] LAST PAP  [  ] LAST LABS [X] RETINA EXAM REPORT  [  ] IMMUNIZATION RECORDS  [  ] Release all info      [  ] Check here and initial the line next to each item to release ALL health information INCLUDING  _____ Care and treatment for drug and / or alcohol abuse  _____ HIV testing, infection status, or AIDS  _____ Genetic Testing    DATES OF SERVICE OR TIME PERIOD TO BE DISCLOSED: _____________  I understand and acknowledge that:  * This Authorization may be revoked at any time by you in writing, except if your health information has already been used or disclosed.  * Your health information that will be used or disclosed as a result of you signing this authorization could be re-disclosed by the recipient. If this occurs, your re-disclosed health information may no longer be protected by State or Federal laws.  * You may refuse to sign this Authorization. Your refusal will not affect your ability to obtain treatment.  * This Authorization becomes effective upon signing and will  on (date) __________.      If no date is indicated, this Authorization will  one (1) year from the signature date.    Name: Radha Mcqueen    Signature:   Date:     6/28/2019       PLEASE FAX REQUESTED RECORDS BACK TO: [phone]

## 2019-06-28 NOTE — LETTER
[facility]  Arlin Cota M.D.  [address]  [place] [address]  Fax: [phone]   Authorization for Release/Disclosure of   Protected Health Information   Name: RADHA MCQUEEN : 1955 SSN: [id number]   Address: [facility] [place]   [place] [address] Phone:    [phone] (home)    I authorize the entity listed below to release/disclose the PHI below to:   [employer]/Arlin Cota M.D. and Arlin Cota M.D.   Provider or Entity Name:  [facility]   Address   [facility], [place]            [address], [place] [address] Phone:  [phone]      Fax:       [phone]          Reason for request: continuity of care   Information to be released:    [ X ] LAST COLONOSCOPY,  including any PATH REPORT and follow-up  [ X ] LAST FIT/COLOGUARD RESULT [  ] LAST DEXA  [  ] LAST MAMMOGRAM  [  ] LAST PAP  [  ] LAST LABS [  ] RETINA EXAM REPORT  [  ] IMMUNIZATION RECORDS  [  ] Release all info      [  ] Check here and initial the line next to each item to release ALL health information INCLUDING  _____ Care and treatment for drug and / or alcohol abuse  _____ HIV testing, infection status, or AIDS  _____ Genetic Testing    DATES OF SERVICE OR TIME PERIOD TO BE DISCLOSED: _____________  I understand and acknowledge that:  * This Authorization may be revoked at any time by you in writing, except if your health information has already been used or disclosed.  * Your health information that will be used or disclosed as a result of you signing this authorization could be re-disclosed by the recipient. If this occurs, your re-disclosed health information may no longer be protected by State or Federal laws.  * You may refuse to sign this Authorization. Your refusal will not affect your ability to obtain treatment.  * This Authorization becomes effective upon signing and will  on (date) __________.      If no date is indicated, this Authorization will  one (1) year  from the signature date.    Name: Christa Ramirez    Signature:   Date:     6/28/2019       PLEASE FAX REQUESTED RECORDS BACK TO: [phone]

## 2019-06-28 NOTE — ASSESSMENT & PLAN NOTE
This is a chronic condition. She gets frequent UTI and her last UTI was ~6 months ago. She did see urology who told her that she gets UTIs because she has incomplete emptying of the bladder.

## 2019-06-28 NOTE — ASSESSMENT & PLAN NOTE
This is a chronic condition. She has been on medication for ~20 years. Her last TSH in 3/2019 was normal.

## 2019-06-28 NOTE — ASSESSMENT & PLAN NOTE
This is a chronic condition.  Weight change: stable  Diet: avoiding processed flours/grains, limiting sugars, working on portion control  Exercise: limited due to knee pain but does walk daily 30 minutes

## 2019-07-08 RX ORDER — RALOXIFENE HYDROCHLORIDE 60 MG/1
60 TABLET, FILM COATED ORAL DAILY
Qty: 90 TAB | Refills: 1 | Status: SHIPPED | OUTPATIENT
Start: 2019-07-08 | End: 2020-01-20

## 2019-08-20 DIAGNOSIS — E78.49 OTHER HYPERLIPIDEMIA: ICD-10-CM

## 2019-08-20 RX ORDER — ROSUVASTATIN CALCIUM 40 MG/1
TABLET, COATED ORAL
Qty: 90 TAB | Refills: 3 | Status: SHIPPED | OUTPATIENT
Start: 2019-08-20 | End: 2020-08-04

## 2019-09-17 DIAGNOSIS — E03.1 CONGENITAL HYPOTHYROIDISM: ICD-10-CM

## 2019-09-18 RX ORDER — LEVOTHYROXINE SODIUM 112 UG/1
TABLET ORAL
Qty: 90 TAB | Refills: 1 | Status: SHIPPED | OUTPATIENT
Start: 2019-09-18 | End: 2020-04-13

## 2019-10-11 ENCOUNTER — OFFICE VISIT (OUTPATIENT)
Dept: MEDICAL GROUP | Facility: PHYSICIAN GROUP | Age: 64
End: 2019-10-11
Payer: COMMERCIAL

## 2019-10-11 VITALS
RESPIRATION RATE: 16 BRPM | TEMPERATURE: 97.5 F | BODY MASS INDEX: 31.15 KG/M2 | OXYGEN SATURATION: 94 % | DIASTOLIC BLOOD PRESSURE: 90 MMHG | WEIGHT: 193.8 LBS | HEART RATE: 82 BPM | HEIGHT: 66 IN | SYSTOLIC BLOOD PRESSURE: 140 MMHG

## 2019-10-11 DIAGNOSIS — Z23 NEED FOR VACCINATION: ICD-10-CM

## 2019-10-11 DIAGNOSIS — E11.9 TYPE 2 DIABETES MELLITUS WITHOUT COMPLICATION, WITHOUT LONG-TERM CURRENT USE OF INSULIN (HCC): Primary | ICD-10-CM

## 2019-10-11 DIAGNOSIS — R00.2 PALPITATION: ICD-10-CM

## 2019-10-11 DIAGNOSIS — I10 ESSENTIAL HYPERTENSION: ICD-10-CM

## 2019-10-11 PROCEDURE — 90746 HEPB VACCINE 3 DOSE ADULT IM: CPT | Performed by: FAMILY MEDICINE

## 2019-10-11 PROCEDURE — 90686 IIV4 VACC NO PRSV 0.5 ML IM: CPT | Performed by: FAMILY MEDICINE

## 2019-10-11 PROCEDURE — 90472 IMMUNIZATION ADMIN EACH ADD: CPT | Performed by: FAMILY MEDICINE

## 2019-10-11 PROCEDURE — 90471 IMMUNIZATION ADMIN: CPT | Performed by: FAMILY MEDICINE

## 2019-10-11 PROCEDURE — 99214 OFFICE O/P EST MOD 30 MIN: CPT | Mod: 25 | Performed by: FAMILY MEDICINE

## 2019-10-11 RX ORDER — OLOPATADINE HYDROCHLORIDE 2 MG/ML
SOLUTION/ DROPS OPHTHALMIC
Refills: 8 | COMMUNITY
Start: 2019-08-11

## 2019-10-11 NOTE — ASSESSMENT & PLAN NOTE
This is a chronic condition.  Current Meds: lisinopril 5 qd  Side effects: none  Home BP Los/80s  Associated symptoms: No chest pain or SOB

## 2019-10-11 NOTE — PROGRESS NOTES
"Subjective:     CC: diabetes follow up    HPI:   Christa presents today with     Type 2 diabetes mellitus without complication (CMS-HCC) (HCC)  This is a chronic condition.  Current medications: metformin 500 bid  Last A1c: 7.5% (2019); today 7.2%  Last Microalb/Cr ratio: 3/2019  Last diabetic foot exam: 3/2019  Last retinal eye exam: 10/2019  ACEi/ARB: lisinopril 5 mg  Statin: rosuvastatin 40 mg  Aspirin: yes  Concomitant HTN: yes - not at goal today  Nightly foot checks: yes      Hypertension  This is a chronic condition.  Current Meds: lisinopril 5 qd  Side effects: none  Home BP Los/80s  Associated symptoms: No chest pain or SOB      Palpitation  Reports occasional \"flutter\" to chest that occasionally occurs after activity. This has been going on for years and not worsened. Exercises for 30 minutes daily. No associated chest pain, trouble breathing or sweating. Reports family history of heart disease.      Past Medical History:   Diagnosis Date   • Cancer (HCC)    • Diabetes ([facility])    • Frequent UTI    • HEMORRHOIDS    • Hyperlipidemia    • Hypertension    • Neoplasm of uncertain behavior of skin 2017   • OSTEOPOROSIS    • Thyroid disease        Social History     Tobacco Use   • Smoking status: Never Smoker   • Smokeless tobacco: Never Used   Substance Use Topics   • Alcohol use: No   • Drug use: No       Current Outpatient Medications Ordered in Epic   Medication Sig Dispense Refill   • olopatadine HCl (PATADAY) 0.2 % ophthalmic solution INSTILL 1 DROP INTO EACH EYE ONCE DAILY  8   • metFORMIN (GLUCOPHAGE) 500 MG Tab Take 1 Tab by mouth 3 times a day. 270 Tab 1   • levothyroxine (SYNTHROID) 112 MCG Tab TAKE 1 TABLET BY MOUTH ONCE DAILY 90 Tab 1   • rosuvastatin (CRESTOR) 40 MG tablet TAKE 1 TABLET BY MOUTH ONCE DAILY 90 Tab 3   • raloxifene (EVISTA) 60 MG Tab Take 1 Tab by mouth every day. 90 Tab 1   • CALCIUM-VITAMIN D PO Take  by mouth.     • Multiple Vitamins-Minerals (MULTIVITAMIN PO) Take  by " "mouth.     • lisinopril (PRINIVIL) 5 MG Tab TAKE 1 TABLET BY MOUTH ONCE DAILY(DISCONTINUE HCTZ) (Patient taking differently: TAKE 1 TABLET BY MOUTH ONCE DAILY) 90 Tab 3   • hydrocortisone rectal (PROCTOCREAM-HC) 2.5 % Cream Apply bid prn 1 Tube 3   • aspirin EC (ECOTRIN) 81 MG Tablet Delayed Response Take 1 Tab by mouth every day. 30 Tab      No current Epic-ordered facility-administered medications on file.        Allergies:  Other food and Boniva [ibandronic acid]    Health Maintenance: Completed    ROS:  Pulm: no sob  CV: no chest pain, palpitations as above  GI: no diarrhea    Objective:       Exam:  /90 (BP Location: Right arm, Patient Position: Sitting, BP Cuff Size: Adult)   Pulse 82   Temp 36.4 °C (97.5 °F) (Temporal)   Resp 16   Ht 1.676 m (5' 6\")   Wt 87.9 kg (193 lb 12.8 oz)   SpO2 94%   BMI 31.28 kg/m²  Body mass index is 31.28 kg/m².    Gen: Alert and oriented, No apparent distress.  Neck: Neck is supple without lymphadenopathy.  Lungs: Normal effort, CTA bilaterally, no wheezes, rhonchi, or rales  CV: Regular rate and rhythm. No murmurs, rubs, or gallops.  Ext: No clubbing, cyanosis, edema.    Labs: A1c today 7.2    Assessment & Plan:     64 y.o. female with the following -     1. Type 2 diabetes mellitus without complication, without long-term current use of insulin (HCC)  This is a chronic condition, better controlled.  At last appointment we increased her metformin use to twice daily as her A1c was still uncontrolled at 7.5%.  Today is dropped down to 7.2% and she reports that she overall feels much better and has better energy.  She is up-to-date with her microalbumin to creatinine ratio, diabetic foot exam, and retinal eye exam.  She is on an ACE inhibitor, statin, and baby aspirin.  - POCT Hemoglobin A1C  -Increase metformin to 500 mg 3 times daily    2. Essential hypertension  This is a chronic condition, controlled questionable.  For last couple visits her blood pressures been " "mildly elevated in the 140s/90s range but she reports at home that her wrist cuff generally is in the 130s/70s-80s range.  She is on a low-dose lisinopril and after discussion she is willing to try a slight increase lisinopril to see if she gets better control of her blood pressure.  -Increase lisinopril to 10 mg daily    3. Palpitation  This is a new condition.  She reports that she has been occasionally getting a \"flutter\" in the left chest that seems to occur after she is exerted herself.  This has been going on for years and has not changed at all.  She does exercise for 30 minutes every day and does not get the symptoms during exercise or during exertion but rather occurs right after exertion.  She has no associated chest pain, shortness of breath, diaphoresis, or tingling down her arms.  She does have a strong family history of heart disease.  Due to her strong family history, personal history of diabetes, and being female making ACS present atypically we will do a stress test just to ensure that this is not ACS.  - EC-ECHOCARDIOGRAM REST/STRESS W/O CONT; Future    4. Need for vaccination  - Influenza Vaccine Quad Injection (PF)  - Hepatitis B Vaccine Adult IM      Return in about 3 months (around 1/11/2020) for diabetes (get A1c), HTN.    Please note that this dictation was created using voice recognition software. I have made every reasonable attempt to correct obvious errors, but I expect that there are errors of grammar and possibly content that I did not discover before finalizing the note.      "

## 2019-10-11 NOTE — ASSESSMENT & PLAN NOTE
This is a chronic condition.  Current medications: metformin 500 bid  Last A1c: 7.5% (6/2019); today 7.2%  Last Microalb/Cr ratio: 3/2019  Last diabetic foot exam: 3/2019  Last retinal eye exam: 10/2019  ACEi/ARB: lisinopril 5 mg  Statin: rosuvastatin 40 mg  Aspirin: yes  Concomitant HTN: yes - not at goal today  Nightly foot checks: yes

## 2019-10-11 NOTE — ASSESSMENT & PLAN NOTE
"Reports occasional \"flutter\" to chest that occasionally occurs after activity. This has been going on for years and not worsened. Exercises for 30 minutes daily. No associated chest pain, trouble breathing or sweating. Reports family history of heart disease.  "

## 2019-10-29 ENCOUNTER — APPOINTMENT (OUTPATIENT)
Dept: CARDIOLOGY | Facility: MEDICAL CENTER | Age: 64
End: 2019-10-29
Attending: FAMILY MEDICINE
Payer: COMMERCIAL

## 2019-11-25 ENCOUNTER — PATIENT MESSAGE (OUTPATIENT)
Dept: MEDICAL GROUP | Facility: PHYSICIAN GROUP | Age: 64
End: 2019-11-25

## 2019-11-28 ENCOUNTER — OFFICE VISIT (OUTPATIENT)
Dept: URGENT CARE | Facility: CLINIC | Age: 64
End: 2019-11-28
Payer: COMMERCIAL

## 2019-11-28 VITALS
SYSTOLIC BLOOD PRESSURE: 124 MMHG | TEMPERATURE: 97.4 F | OXYGEN SATURATION: 96 % | RESPIRATION RATE: 16 BRPM | DIASTOLIC BLOOD PRESSURE: 86 MMHG | BODY MASS INDEX: 29.47 KG/M2 | WEIGHT: 183.4 LBS | HEART RATE: 86 BPM | HEIGHT: 66 IN

## 2019-11-28 DIAGNOSIS — R19.7 DIARRHEA, UNSPECIFIED TYPE: ICD-10-CM

## 2019-11-28 DIAGNOSIS — K52.9 GASTROENTERITIS: ICD-10-CM

## 2019-11-28 PROCEDURE — 99214 OFFICE O/P EST MOD 30 MIN: CPT | Performed by: NURSE PRACTITIONER

## 2019-11-28 RX ORDER — DIPHENOXYLATE HYDROCHLORIDE AND ATROPINE SULFATE 2.5; .025 MG/1; MG/1
1 TABLET ORAL 3 TIMES DAILY PRN
Qty: 10 TAB | Refills: 0 | Status: SHIPPED | OUTPATIENT
Start: 2019-11-28 | End: 2019-12-01 | Stop reason: SDUPTHER

## 2019-11-28 ASSESSMENT — ENCOUNTER SYMPTOMS
DIZZINESS: 0
VOMITING: 0
WEIGHT LOSS: 1
COUGH: 0
CHILLS: 0
BACK PAIN: 0
NAUSEA: 0
DIARRHEA: 1
ABDOMINAL PAIN: 0
SENSORY CHANGE: 0
BLOOD IN STOOL: 0
HEADACHES: 0
NERVOUS/ANXIOUS: 0
FEVER: 0

## 2019-11-28 ASSESSMENT — LIFESTYLE VARIABLES: SUBSTANCE_ABUSE: 0

## 2019-11-28 NOTE — PROGRESS NOTES
"Subjective:      Christa Ramirez is a 64 y.o. female who presents with Diarrhea (x 1 wk, diarrhea )    Reviewed past medical, surgical and family history. Reviewed prescription and OTC medications with patient in electronic health record today      Allergies   Allergen Reactions   • Other Food Anaphylaxis     Walnuts    • Boniva [Ibandronic Acid]      \"right side numbness\"             HPI this is a new problem.  Christa is a 64-year-old female who presents with diarrhea for 1 week.  Her diarrhea is loose and liquidy and occurs 5-6 times every day.  She is a  and reports that many of the kids have had diarrhea.  She is tried over-the-counter Kaopectate with mild relief of her symptoms.  She reports some mild abdominal cramping.  Denies nausea, vomiting, fever, chills, ill contacts, recent use of antibiotics.  No other aggravating or alleviating factors.  \"I just cannot take this diarrhea anymore\".      Review of Systems   Constitutional: Positive for weight loss. Negative for chills, fever and malaise/fatigue.   Respiratory: Negative for cough.    Gastrointestinal: Positive for diarrhea. Negative for abdominal pain, blood in stool, melena, nausea and vomiting.   Musculoskeletal: Negative for back pain.   Skin: Negative for itching and rash.   Neurological: Negative for dizziness, sensory change and headaches.   Endo/Heme/Allergies: Negative for environmental allergies.   Psychiatric/Behavioral: Negative for substance abuse. The patient is not nervous/anxious.           Objective:     /86 (BP Location: Left arm, Patient Position: Sitting, BP Cuff Size: Adult)   Pulse 86   Temp 36.3 °C (97.4 °F) (Temporal)   Resp 16   Ht 1.676 m (5' 6\")   Wt 83.2 kg (183 lb 6.4 oz)   SpO2 96%   BMI 29.60 kg/m²      Physical Exam  Vitals signs and nursing note reviewed.   Constitutional:       General: She is not in acute distress.     Appearance: Normal appearance. She is well-developed. She is not " toxic-appearing.   HENT:      Head: Normocephalic.      Right Ear: Hearing normal.      Left Ear: Hearing normal.      Mouth/Throat:      Mouth: Mucous membranes are moist.      Pharynx: Oropharynx is clear. Uvula midline.   Eyes:      General: Lids are normal.      Pupils: Pupils are equal, round, and reactive to light.   Neck:      Musculoskeletal: Full passive range of motion without pain and normal range of motion.      Trachea: Trachea and phonation normal.   Cardiovascular:      Rate and Rhythm: Normal rate and regular rhythm.   Pulmonary:      Effort: Pulmonary effort is normal.      Breath sounds: Normal breath sounds.   Abdominal:      Palpations: Abdomen is soft.      Tenderness: There is generalized tenderness. There is no right CVA tenderness, guarding or rebound. Negative signs include Salinas's sign and McBurney's sign.   Lymphadenopathy:      Cervical: No cervical adenopathy.      Upper Body:      Right upper body: No supraclavicular adenopathy.      Left upper body: No supraclavicular adenopathy.   Skin:     General: Skin is warm and dry.      Findings: No rash.   Neurological:      Mental Status: She is alert and oriented to person, place, and time.   Psychiatric:         Mood and Affect: Mood normal.         Speech: Speech normal.         Behavior: Behavior normal. Behavior is cooperative.         Thought Content: Thought content normal.                 Assessment/Plan:       1. Diarrhea, unspecified type  CULTURE STOOL    diphenoxylate-atropine (LOMOTIL) 2.5-0.025 MG Tab   2. Gastroenteritis         Educated in proper administration of medication(s) ordered today including safety, possible SE, risks, benefits, rationale and alternatives to therapy.     BRAT(Y) diet as tolerated. .  Keep well hydrated    Return to clinic or PCP  4-5  days if current symptoms are not resolving in a satisfactory manner or sooner if new or worsening symptoms occur. Differential diagnosis, natural history, supportive  care, and indications for immediate follow-up discussed at length.   Patient was advised of signs and symptoms which would warrant further evaluation and /or emergent evaluation in ER.  Verbalized agreement with this treatment plan and seemed to understand without barriers. Questions were encouraged and answered to patients satisfaction.

## 2019-11-29 ENCOUNTER — HOSPITAL ENCOUNTER (OUTPATIENT)
Facility: MEDICAL CENTER | Age: 64
End: 2019-11-29
Attending: NURSE PRACTITIONER
Payer: COMMERCIAL

## 2019-11-29 DIAGNOSIS — R19.7 DIARRHEA, UNSPECIFIED TYPE: ICD-10-CM

## 2019-11-29 PROCEDURE — 87045 FECES CULTURE AEROBIC BACT: CPT

## 2019-11-29 PROCEDURE — 87899 AGENT NOS ASSAY W/OPTIC: CPT

## 2019-11-29 PROCEDURE — 87186 SC STD MICRODIL/AGAR DIL: CPT

## 2019-11-29 PROCEDURE — 87046 STOOL CULTR AEROBIC BACT EA: CPT

## 2019-11-30 LAB
E COLI SXT1+2 STL IA: NORMAL
SIGNIFICANT IND 70042: NORMAL
SITE SITE: NORMAL
SOURCE SOURCE: NORMAL

## 2019-12-01 ENCOUNTER — OFFICE VISIT (OUTPATIENT)
Dept: URGENT CARE | Facility: CLINIC | Age: 64
End: 2019-12-01
Payer: COMMERCIAL

## 2019-12-01 VITALS
BODY MASS INDEX: 29.25 KG/M2 | HEART RATE: 80 BPM | RESPIRATION RATE: 16 BRPM | TEMPERATURE: 97.6 F | DIASTOLIC BLOOD PRESSURE: 80 MMHG | SYSTOLIC BLOOD PRESSURE: 120 MMHG | HEIGHT: 66 IN | OXYGEN SATURATION: 98 % | WEIGHT: 182 LBS

## 2019-12-01 DIAGNOSIS — R19.7 DIARRHEA, UNSPECIFIED TYPE: ICD-10-CM

## 2019-12-01 PROCEDURE — 99214 OFFICE O/P EST MOD 30 MIN: CPT | Performed by: FAMILY MEDICINE

## 2019-12-01 RX ORDER — DIPHENOXYLATE HYDROCHLORIDE AND ATROPINE SULFATE 2.5; .025 MG/1; MG/1
1 TABLET ORAL 3 TIMES DAILY PRN
Qty: 15 TAB | Refills: 0 | Status: SHIPPED | OUTPATIENT
Start: 2019-12-01 | End: 2019-12-06

## 2019-12-01 NOTE — PROGRESS NOTES
"  Chief Complaint   Patient presents with   • Diarrhea     x 10 days, diarrhea  \"Pt. was seen on 11/28 for same problem\"           Diarrhea       Here for f/u:    She has had diarrhea for 10 days.        Gradually improving with lomotil.       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           Social History     Socioeconomic History   • Marital status:      Spouse name: Not on file   • Number of children: Not on file   • Years of education: Not on file   • Highest education level: Not on file   Occupational History   • Not on file   Social Needs   • Financial resource strain: Not on file   • Food insecurity:     Worry: Not on file     Inability: Not on file   • Transportation needs:     Medical: Not on file     Non-medical: Not on file   Tobacco Use   • Smoking status: Never Smoker   • Smokeless tobacco: Never Used   Substance and Sexual Activity   • Alcohol use: No   • Drug use: No   • Sexual activity: Yes     Partners: Male     Comment: , 4 children, 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[employer]   Lifestyle   • Physical activity:     Days per week: Not on file     Minutes per session: Not on file   • Stress: Not on file   Relationships   • Social connections:     Talks on phone: Not on file     Gets together: Not on file     Attends Adventism service: Not on file     Active member of club or organization: Not on file     Attends meetings of clubs or organizations: Not on file     Relationship status: Not on file   • Intimate partner violence:     Fear of current or ex partner: Not on file     Emotionally abused: Not on file     Physically abused: Not on file     Forced sexual activity: Not on file   Other Topics Concern   • Not on file   Social History Narrative   • Not on file           Past Medical History:   Diagnosis Date   • Cancer (HCC)    • Diabetes (HCC)    • Frequent UTI    • HEMORRHOIDS    • Hyperlipidemia    • Hypertension    • Neoplasm " "of uncertain behavior of skin 2/28/2017   • OSTEOPOROSIS    • Thyroid disease            Review of Systems   Constitutional: Negative for fever, chills and weight loss.   Respiratory: Negative for cough and wheezing.    Cardiovascular: Negative for chest pain.   Gastrointestinal: Positive for diarrhea    . Negative for nausea, vomiting, abdominal pain and blood in stool.   Neurological: Negative for dizziness and headaches.   All other systems reviewed and are negative.         Objective:     /80 (BP Location: Left arm, Patient Position: Sitting, BP Cuff Size: Large adult)   Pulse 80   Temp 36.4 °C (97.6 °F) (Temporal)   Resp 16   Ht 1.676 m (5' 6\")   Wt 82.6 kg (182 lb)   SpO2 98%     Physical Exam   Constitutional: pt is oriented to person, place, and time and appears well-developed. No distress.   HENT:   Head: Normocephalic and atraumatic.   Mouth/Throat: No oropharyngeal exudate.   Eyes: Conjunctivae are normal. No scleral icterus.   Cardiovascular: Normal rate, regular rhythm and normal heart sounds.    Pulmonary/Chest: Effort normal and breath sounds normal. No respiratory distress. Pt has no wheezes. Pt has no rales.   Abdominal: Normal appearance and bowel sounds are normal. There is no splenomegaly or hepatomegaly. There is no tenderness. There is no rebound, no guarding, no CVA tenderness and no tenderness at McBurney's point.   Lymphadenopathy:     Pt has no cervical adenopathy.   Neurological: pt is alert and oriented to person, place, and time.   Skin: Skin is warm. Pt is not diaphoretic. No erythema.   Psychiatric:  behavior is normal.   Nursing note and vitals reviewed.              Assessment/Plan:         1. Diarrhea, unspecified type  Improving  Lomotil refilled  Continue to push fluids    Stool cx were negative.   Follow up in one week if no improvement, sooner if symptoms worsen.     - diphenoxylate-atropine (LOMOTIL) 2.5-0.025 MG Tab; Take 1 Tab by mouth 3 times a day as needed for " Diarrhea for up to 5 days.  Dispense: 15 Tab; Refill: 0

## 2019-12-01 NOTE — LETTER
December 1, 2019         Patient: Christa Ramirez   YOB: 1955   Date of Visit: 12/1/2019           To Whom it May Concern:    Christa Ramirez was seen in my clinic on 12/1/2019. She may return to work on 12/4.    If you have any questions or concerns, please don't hesitate to call.        Sincerely,           Abbe Collazo M.D.  Electronically Signed

## 2019-12-02 ENCOUNTER — TELEPHONE (OUTPATIENT)
Dept: URGENT CARE | Facility: PHYSICIAN GROUP | Age: 64
End: 2019-12-02

## 2019-12-02 DIAGNOSIS — A04.8 INTESTINAL INFECTION DUE TO AEROMONAS HYDROPHILA: ICD-10-CM

## 2019-12-02 LAB
BACTERIA STL CULT: ABNORMAL
BACTERIA STL CULT: ABNORMAL
E COLI SXT1+2 STL IA: ABNORMAL
SIGNIFICANT IND 70042: ABNORMAL
SITE SITE: ABNORMAL
SOURCE SOURCE: ABNORMAL

## 2019-12-02 RX ORDER — CEFDINIR 300 MG/1
300 CAPSULE ORAL 2 TIMES DAILY
Qty: 10 CAP | Refills: 0 | Status: SHIPPED | OUTPATIENT
Start: 2019-12-02 | End: 2019-12-07

## 2019-12-02 NOTE — TELEPHONE ENCOUNTER
Still having liquid diarrhea  ~ 4-5x / day .   Seen in UC for second visit. Given more lomotil which helps reduce her diarrhea.   Given results of stool culture.   RX sent to pharmacy.   Educated in proper administration of medication(s) ordered today including safety, possible SE, risks, benefits, rationale and alternatives to therapy.

## 2020-01-02 DIAGNOSIS — I10 ESSENTIAL HYPERTENSION: ICD-10-CM

## 2020-01-02 RX ORDER — LISINOPRIL 5 MG/1
TABLET ORAL
Qty: 90 TAB | Refills: 0 | Status: SHIPPED
Start: 2020-01-02 | End: 2020-02-20

## 2020-01-02 NOTE — TELEPHONE ENCOUNTER
Narcisa the patient seen in the last year in this department? Yes    Does patient have an active prescription for medications requested? No     Received Request Via: Pharmacy

## 2020-01-17 ENCOUNTER — HOSPITAL ENCOUNTER (OUTPATIENT)
Dept: RADIOLOGY | Facility: MEDICAL CENTER | Age: 65
End: 2020-01-17
Attending: FAMILY MEDICINE
Payer: COMMERCIAL

## 2020-01-17 ENCOUNTER — APPOINTMENT (OUTPATIENT)
Dept: MEDICAL GROUP | Facility: PHYSICIAN GROUP | Age: 65
End: 2020-01-17
Payer: COMMERCIAL

## 2020-01-17 DIAGNOSIS — Z12.31 VISIT FOR SCREENING MAMMOGRAM: ICD-10-CM

## 2020-01-17 PROCEDURE — 77067 SCR MAMMO BI INCL CAD: CPT

## 2020-01-20 RX ORDER — RALOXIFENE HYDROCHLORIDE 60 MG/1
TABLET, FILM COATED ORAL
Qty: 90 TAB | Refills: 3 | Status: SHIPPED | OUTPATIENT
Start: 2020-01-20 | End: 2021-02-16

## 2020-03-07 NOTE — ASSESSMENT & PLAN NOTE
Suspect neurofibromas, can continue to monitor. Will refer patient to dermatology for evaluation of these skin findings.    This is a chronic condition.  Current medications: metformin 500 qd  Last A1c: 7.8% (3/2019)  Last diabetic foot exam: 3/2019  Last retinal eye exam: 10/2018 - requesting records  ACEi/ARB: lisinopril  Statin: rosuvastatin   Aspirin: yes  Concomitant HTN: yes - not at goal today  Nightly foot checks: yes

## 2020-03-20 ENCOUNTER — OFFICE VISIT (OUTPATIENT)
Dept: MEDICAL GROUP | Facility: PHYSICIAN GROUP | Age: 65
End: 2020-03-20
Payer: COMMERCIAL

## 2020-03-20 VITALS
HEART RATE: 76 BPM | RESPIRATION RATE: 16 BRPM | OXYGEN SATURATION: 93 % | SYSTOLIC BLOOD PRESSURE: 164 MMHG | TEMPERATURE: 97.9 F | DIASTOLIC BLOOD PRESSURE: 96 MMHG | BODY MASS INDEX: 30.31 KG/M2 | WEIGHT: 188.6 LBS | HEIGHT: 66 IN

## 2020-03-20 DIAGNOSIS — Z00.00 WELLNESS EXAMINATION: Primary | ICD-10-CM

## 2020-03-20 DIAGNOSIS — B07.0 PLANTAR WART: ICD-10-CM

## 2020-03-20 DIAGNOSIS — Z23 NEED FOR VACCINATION: ICD-10-CM

## 2020-03-20 DIAGNOSIS — E11.9 TYPE 2 DIABETES MELLITUS WITHOUT COMPLICATION, WITHOUT LONG-TERM CURRENT USE OF INSULIN (HCC): ICD-10-CM

## 2020-03-20 PROCEDURE — 90471 IMMUNIZATION ADMIN: CPT | Performed by: FAMILY MEDICINE

## 2020-03-20 PROCEDURE — 17110 DESTRUCTION B9 LES UP TO 14: CPT | Performed by: FAMILY MEDICINE

## 2020-03-20 PROCEDURE — 99396 PREV VISIT EST AGE 40-64: CPT | Mod: 25 | Performed by: FAMILY MEDICINE

## 2020-03-20 PROCEDURE — 90746 HEPB VACCINE 3 DOSE ADULT IM: CPT | Performed by: FAMILY MEDICINE

## 2020-03-20 ASSESSMENT — FIBROSIS 4 INDEX: FIB4 SCORE: 0.75

## 2020-03-20 ASSESSMENT — PATIENT HEALTH QUESTIONNAIRE - PHQ9: CLINICAL INTERPRETATION OF PHQ2 SCORE: 0

## 2020-03-20 NOTE — PROGRESS NOTES
Subjective:     CC:   Chief Complaint   Patient presents with   • Annual Exam       HPI:   Christa Ramirez is a 64 y.o. female who presents for annual exam. She is feeling well and denies any complaints.    Ob-Gyn/ History:    Patient has GYN provider: no  /Para:  5/4  Last Pap Smear:  . No history of abnormal pap smears.  Gyn Surgery:  hysterectomy.  Current Contraceptive Method:  N/a. Yes currently sexually active.  Last menstrual period:  N/a.  No significant bloating/fluid retention, pelvic pain, or dyspareunia. No vaginal discharge  Post-menopausal bleeding: no  Urinary incontinence: continuous leakage from prolapse; has ciprofloxacin on hand in case she gets a sensation of UTI, not present for last 6 months  Folate intake: n/a     Health Maintenance  Advanced directive: n/a   Osteoporosis Screen/ DEXA: n/a   PT/vit D for falls prevention: n/a   Cholesterol Screening: 3/2019 - T chol 187, , HDL 47, ; ASCVD 10.5%; rosuvastatin    Diabetes Screening: n/a - has DM   Aspirin Use: yes    Diet: healthy   Exercise: regular - recumbent bike, walk, swim in summer   Substance Abuse: no   Safe in relationship.   Seat belts, bike helmet, gun safety discussed.  Sun protection used.    Cancer screening  Colorectal Cancer Screening: due     Lung Cancer Screening: n/a - never smoker  Cervical Cancer Screening: n/a - hysterectomy   Breast Cancer Screening: due     Infectious disease screening/Immunizations  --STI Screening: declined   --Practices safe sex.  --HIV Screening: declined   --Hepatitis C Screening: ordered   --Immunizations:    Influenza: completed    HPV:  n/a    Tetanus: due     Shingles: recommended   Pneumococcal : n/a     Other immunizations: Hep B #3 ordered     She  has a past medical history of Cancer (HCC), Diabetes (HCC), Frequent UTI, HEMORRHOIDS, Hyperlipidemia, Hypertension, Neoplasm of uncertain behavior of skin (2017), OSTEOPOROSIS, and Thyroid  disease.  She  has a past surgical history that includes bladder suspension; colonoscopy (2007); knee arthroplasty total; appendectomy; tonsillectomy; and abdominal hysterectomy total.    Family History   Problem Relation Age of Onset   • Heart Disease Mother    • Hyperlipidemia Brother    • Heart Disease Brother 65        MI   • Heart Disease Maternal Grandmother    • Hyperlipidemia Maternal Grandmother    • Cancer Neg Hx        Social History     Socioeconomic History   • Marital status:      Spouse name: Not on file   • Number of children: Not on file   • Years of education: Not on file   • Highest education level: Not on file   Occupational History   • Not on file   Social Needs   • Financial resource strain: Not on file   • Food insecurity     Worry: Not on file     Inability: Not on file   • Transportation needs     Medical: Not on file     Non-medical: Not on file   Tobacco Use   • Smoking status: Never Smoker   • Smokeless tobacco: Never Used   Substance and Sexual Activity   • Alcohol use: No   • Drug use: No   • Sexual activity: Yes     Partners: Male     Comment: , 4 children, 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[employer]   Lifestyle   • Physical activity     Days per week: Not on file     Minutes per session: Not on file   • Stress: Not on file   Relationships   • Social connections     Talks on phone: Not on file     Gets together: Not on file     Attends Mormonism service: Not on file     Active member of club or organization: Not on file     Attends meetings of clubs or organizations: Not on file     Relationship status: Not on file   • Intimate partner violence     Fear of current or ex partner: Not on file     Emotionally abused: Not on file     Physically abused: Not on file     Forced sexual activity: Not on file   Other Topics Concern   • Not on file   Social History Narrative   • Not on file       Patient Active Problem List    Diagnosis Date Noted   • Palpitation 10/11/2019   • Chronic pain of right knee  "11/10/2017   • Vitamin D deficiency 11/10/2017   • Osteopenia 02/16/2016   • Hypothyroid 11/18/2012   • Type 2 diabetes mellitus without complication (HCC) 11/18/2012   • Obesity (BMI 30-39.9) 11/18/2012   • Hypertension    • Hyperlipidemia    • Frequent UTI    • Hemorrhoid          Current Outpatient Medications   Medication Sig Dispense Refill   • lisinopril (PRINIVIL) 10 MG Tab Take 1 Tab by mouth every day. 90 Tab 0   • raloxifene (EVISTA) 60 MG Tab TAKE 1 TABLET BY MOUTH ONCE DAILY 90 Tab 3   • olopatadine HCl (PATADAY) 0.2 % ophthalmic solution INSTILL 1 DROP INTO EACH EYE ONCE DAILY  8   • metFORMIN (GLUCOPHAGE) 500 MG Tab Take 1 Tab by mouth 3 times a day. (Patient taking differently: Take 500 mg by mouth 2 times a day, with meals.) 270 Tab 1   • levothyroxine (SYNTHROID) 112 MCG Tab TAKE 1 TABLET BY MOUTH ONCE DAILY 90 Tab 1   • rosuvastatin (CRESTOR) 40 MG tablet TAKE 1 TABLET BY MOUTH ONCE DAILY 90 Tab 3   • CALCIUM-VITAMIN D PO Take  by mouth.     • Multiple Vitamins-Minerals (MULTIVITAMIN PO) Take  by mouth.     • hydrocortisone rectal (PROCTOCREAM-HC) 2.5 % Cream Apply bid prn 1 Tube 3   • aspirin EC (ECOTRIN) 81 MG Tablet Delayed Response Take 1 Tab by mouth every day. 30 Tab      No current facility-administered medications for this visit.      Allergies   Allergen Reactions   • Other Food Anaphylaxis     Walnuts    • Boniva [Ibandronic Acid]      \"right side numbness\"       Review of Systems   Constitutional: Negative for fever, chills.   HENT: Negative for congestion.    Eyes: Negative for pain.   Respiratory: Negative for cough .    Cardiovascular: Negative for leg swelling.   Gastrointestinal: Negative for nausea, vomiting.   Genitourinary: Negative for dysuria.   Skin: Negative for rash.   Neurological: Negative for dizziness.   Endo/Heme/Allergies: Does not bleed easily.   Psychiatric/Behavioral: Negative for depression.  The patient is not nervous/anxious.      Objective:     BP (!) 164/96 (BP " "Location: Right arm, Patient Position: Sitting, BP Cuff Size: Adult)   Pulse 76   Temp 36.6 °C (97.9 °F) (Temporal)   Resp 16   Ht 1.676 m (5' 6\")   Wt 85.5 kg (188 lb 9.6 oz)   SpO2 93%   BMI 30.44 kg/m²   Body mass index is 30.44 kg/m².  Wt Readings from Last 4 Encounters:   03/20/20 85.5 kg (188 lb 9.6 oz)   12/01/19 82.6 kg (182 lb)   11/28/19 83.2 kg (183 lb 6.4 oz)   10/11/19 87.9 kg (193 lb 12.8 oz)       Physical Exam:  Constitutional: Well-developed and well-nourished. Not diaphoretic. No distress.   Skin: Skin is warm and dry. No rash noted. Plantar wart on dorsum of right hand with callus.  Head: Atraumatic without lesions.  Eyes: Conjunctivae and extraocular motions are normal. Pupils are equal, round, and reactive to light. No scleral icterus.   Ears:  External ears unremarkable. Tympanic membrane clear and intact on right. Impacted cerumen on left.  Mouth/Throat: Dentition is good. Tongue normal. Oropharynx is clear and moist. Posterior pharynx without erythema or exudates.  Neck: Supple, trachea midline. Normal range of motion. No thyromegaly present. No lymphadenopathy--cervical or supraclavicular.  Cardiovascular: Regular rate and rhythm, S1 and S2 without murmur, rubs, or gallops.  Lungs: Normal inspiratory effort, CTA bilaterally, no wheezes/rhonchi/rales  Abdomen: Soft, non tender, and without distention. Active bowel sounds in all four quadrants. No rebound, guarding, masses or HSM.  Extremities: No cyanosis, clubbing, erythema, nor edema. Distal pulses intact and symmetric.   Musculoskeletal: All major joints AROM full in all directions without pain.  Neurological: Alert and oriented x 3. DTRs 2+/3 and symmetric. No cranial nerve deficit. 5/5 myotomes. Sensation intact.   Psychiatric:  Behavior, mood, and affect are appropriate.    Assessment and Plan:     1. Wellness examination  2. Need for vaccination  Christa is a pleasant 64 old woman here today for annual wellness visit.  She has no " acute concerns.  Last dose of the hepatitis B vaccine was given today.  She is up-to-date with her health maintenance otherwise.  - Hepatitis B Vaccine Adult IM  - CBC WITH DIFFERENTIAL; Future  - TSH WITH REFLEX TO FT4; Future    Patient counseled about skin care, diet, supplements, prenatal vitamins, safe sex and exercise.    3. Type 2 diabetes mellitus without complication, without long-term current use of insulin (HCC)  This is chronic condition, controlled.  Is due for her yearly lab work from the diabetes which has been ordered.  - Comp Metabolic Panel; Future  - HEMOGLOBIN A1C; Future  - Lipid Profile; Future  - MICROALBUMIN CREAT RATIO URINE; Future    4. Plantar wart  This is a new condition.  During her exam she mentioned a plantar wart on the dorsum of her right hand is been present for several months.  She has been trying over-the-counter remedies without much relief.  Cryotherapy attempted in the office, see procedure note for more details.  -Return to office for cryotherapy after shaving down callus on top of the wart    Follow-up: Return for cryotherapy.

## 2020-03-20 NOTE — PROCEDURES
CRYOTHERAPY:  Discussed the benign nature of these lesions.  After discussing risks and benefits, verbal consent was obtained and cryotherapy performed using liquid nitrogen, freeze-thaw-freeze technique x 3.  Patient did not feel any pain with the procedure so I suspect that she will need callus removal before second attempt.

## 2020-04-12 DIAGNOSIS — E03.1 CONGENITAL HYPOTHYROIDISM: ICD-10-CM

## 2020-04-13 RX ORDER — LEVOTHYROXINE SODIUM 112 UG/1
TABLET ORAL
Qty: 90 TAB | Refills: 0 | Status: SHIPPED | OUTPATIENT
Start: 2020-04-13 | End: 2020-07-20

## 2020-05-12 RX ORDER — LISINOPRIL 10 MG/1
TABLET ORAL
Qty: 90 TAB | Refills: 0 | Status: SHIPPED | OUTPATIENT
Start: 2020-05-12 | End: 2020-08-03

## 2020-06-03 ENCOUNTER — HOSPITAL ENCOUNTER (OUTPATIENT)
Dept: LAB | Facility: MEDICAL CENTER | Age: 65
End: 2020-06-03
Attending: FAMILY MEDICINE
Payer: COMMERCIAL

## 2020-06-03 DIAGNOSIS — E11.9 TYPE 2 DIABETES MELLITUS WITHOUT COMPLICATION, WITHOUT LONG-TERM CURRENT USE OF INSULIN (HCC): ICD-10-CM

## 2020-06-03 DIAGNOSIS — Z00.00 WELLNESS EXAMINATION: ICD-10-CM

## 2020-06-03 LAB
ALBUMIN SERPL BCP-MCNC: 3.9 G/DL (ref 3.2–4.9)
ALBUMIN/GLOB SERPL: 1.3 G/DL
ALP SERPL-CCNC: 47 U/L (ref 30–99)
ALT SERPL-CCNC: 14 U/L (ref 2–50)
ANION GAP SERPL CALC-SCNC: 10 MMOL/L (ref 7–16)
AST SERPL-CCNC: 17 U/L (ref 12–45)
BASOPHILS # BLD AUTO: 0.7 % (ref 0–1.8)
BASOPHILS # BLD: 0.04 K/UL (ref 0–0.12)
BILIRUB SERPL-MCNC: 0.3 MG/DL (ref 0.1–1.5)
BUN SERPL-MCNC: 21 MG/DL (ref 8–22)
CALCIUM SERPL-MCNC: 9.7 MG/DL (ref 8.5–10.5)
CHLORIDE SERPL-SCNC: 100 MMOL/L (ref 96–112)
CHOLEST SERPL-MCNC: 155 MG/DL (ref 100–199)
CO2 SERPL-SCNC: 25 MMOL/L (ref 20–33)
CREAT SERPL-MCNC: 0.72 MG/DL (ref 0.5–1.4)
CREAT UR-MCNC: 109.53 MG/DL
EOSINOPHIL # BLD AUTO: 0.17 K/UL (ref 0–0.51)
EOSINOPHIL NFR BLD: 2.8 % (ref 0–6.9)
ERYTHROCYTE [DISTWIDTH] IN BLOOD BY AUTOMATED COUNT: 50 FL (ref 35.9–50)
FASTING STATUS PATIENT QL REPORTED: NORMAL
GLOBULIN SER CALC-MCNC: 2.9 G/DL (ref 1.9–3.5)
GLUCOSE SERPL-MCNC: 104 MG/DL (ref 65–99)
HCT VFR BLD AUTO: 44.8 % (ref 37–47)
HDLC SERPL-MCNC: 53 MG/DL
HGB BLD-MCNC: 13.8 G/DL (ref 12–16)
IMM GRANULOCYTES # BLD AUTO: 0.01 K/UL (ref 0–0.11)
IMM GRANULOCYTES NFR BLD AUTO: 0.2 % (ref 0–0.9)
LDLC SERPL CALC-MCNC: 76 MG/DL
LYMPHOCYTES # BLD AUTO: 1.6 K/UL (ref 1–4.8)
LYMPHOCYTES NFR BLD: 26.2 % (ref 22–41)
MCH RBC QN AUTO: 27.6 PG (ref 27–33)
MCHC RBC AUTO-ENTMCNC: 30.8 G/DL (ref 33.6–35)
MCV RBC AUTO: 89.6 FL (ref 81.4–97.8)
MICROALBUMIN UR-MCNC: <1.2 MG/DL
MICROALBUMIN/CREAT UR: NORMAL MG/G (ref 0–30)
MONOCYTES # BLD AUTO: 0.43 K/UL (ref 0–0.85)
MONOCYTES NFR BLD AUTO: 7 % (ref 0–13.4)
NEUTROPHILS # BLD AUTO: 3.86 K/UL (ref 2–7.15)
NEUTROPHILS NFR BLD: 63.1 % (ref 44–72)
NRBC # BLD AUTO: 0 K/UL
NRBC BLD-RTO: 0 /100 WBC
PLATELET # BLD AUTO: 309 K/UL (ref 164–446)
PMV BLD AUTO: 9.1 FL (ref 9–12.9)
POTASSIUM SERPL-SCNC: 3.9 MMOL/L (ref 3.6–5.5)
PROT SERPL-MCNC: 6.8 G/DL (ref 6–8.2)
RBC # BLD AUTO: 5 M/UL (ref 4.2–5.4)
SODIUM SERPL-SCNC: 135 MMOL/L (ref 135–145)
TRIGL SERPL-MCNC: 132 MG/DL (ref 0–149)
TSH SERPL DL<=0.005 MIU/L-ACNC: 1.37 UIU/ML (ref 0.38–5.33)
WBC # BLD AUTO: 6.1 K/UL (ref 4.8–10.8)

## 2020-06-03 PROCEDURE — 80053 COMPREHEN METABOLIC PANEL: CPT

## 2020-06-03 PROCEDURE — 83036 HEMOGLOBIN GLYCOSYLATED A1C: CPT

## 2020-06-03 PROCEDURE — 84443 ASSAY THYROID STIM HORMONE: CPT

## 2020-06-03 PROCEDURE — 82043 UR ALBUMIN QUANTITATIVE: CPT

## 2020-06-03 PROCEDURE — 36415 COLL VENOUS BLD VENIPUNCTURE: CPT

## 2020-06-03 PROCEDURE — 80061 LIPID PANEL: CPT

## 2020-06-03 PROCEDURE — 82570 ASSAY OF URINE CREATININE: CPT

## 2020-06-03 PROCEDURE — 85025 COMPLETE CBC W/AUTO DIFF WBC: CPT

## 2020-06-04 LAB
EST. AVERAGE GLUCOSE BLD GHB EST-MCNC: 192 MG/DL
HBA1C MFR BLD: 8.3 % (ref 0–5.6)

## 2020-06-05 RX ORDER — METFORMIN HYDROCHLORIDE 1000 MG/1
1 TABLET, FILM COATED, EXTENDED RELEASE ORAL 2 TIMES DAILY
Qty: 180 TAB | Refills: 0 | Status: SHIPPED | OUTPATIENT
Start: 2020-06-05 | End: 2020-06-08 | Stop reason: CLARIF

## 2020-06-05 NOTE — PROGRESS NOTES
A1c 8.3%. She is currently on metformin 500 tid, going to increase to 1000 mg bid. I will send in the ER formulation as she is getting some stomach upset.

## 2020-06-08 ENCOUNTER — TELEPHONE (OUTPATIENT)
Dept: MEDICAL GROUP | Facility: PHYSICIAN GROUP | Age: 65
End: 2020-06-08

## 2020-06-08 RX ORDER — METFORMIN HYDROCHLORIDE 500 MG/1
1000 TABLET, EXTENDED RELEASE ORAL 2 TIMES DAILY
Qty: 360 TAB | Refills: 1 | Status: SHIPPED | OUTPATIENT
Start: 2020-06-08 | End: 2020-12-14

## 2020-06-08 NOTE — TELEPHONE ENCOUNTER
[facility] has informed us the Pt's insurance will not cover the Meformin HCI (MOD) 1000mg tablets to Metformin HCI 500mg tablets.  They are asking the new script read to take 2 tablets per day BID at a 90 day supply.

## 2020-08-03 RX ORDER — LISINOPRIL 10 MG/1
TABLET ORAL
Qty: 90 TAB | Refills: 0 | Status: SHIPPED | OUTPATIENT
Start: 2020-08-03 | End: 2020-11-02

## 2020-09-17 RX ORDER — SULFAMETHOXAZOLE AND TRIMETHOPRIM 800; 160 MG/1; MG/1
1 TABLET ORAL 2 TIMES DAILY
Qty: 6 TAB | Refills: 0 | Status: SHIPPED | OUTPATIENT
Start: 2020-09-17 | End: 2020-09-20

## 2020-09-17 NOTE — PROGRESS NOTES
She sent a [employer] message confirming that she has all the classic symptoms of a UTI.  Prescription for Bactrim has been sent.

## 2020-11-02 RX ORDER — LISINOPRIL 10 MG/1
TABLET ORAL
Qty: 90 TAB | Refills: 0 | Status: SHIPPED | OUTPATIENT
Start: 2020-11-02 | End: 2021-01-25

## 2020-12-14 RX ORDER — METFORMIN HYDROCHLORIDE 500 MG/1
TABLET, EXTENDED RELEASE ORAL
Qty: 360 TAB | Refills: 0 | Status: SHIPPED | OUTPATIENT
Start: 2020-12-14 | End: 2021-03-25

## 2021-01-21 ENCOUNTER — HOSPITAL ENCOUNTER (OUTPATIENT)
Dept: RADIOLOGY | Facility: MEDICAL CENTER | Age: 66
End: 2021-01-21
Attending: FAMILY MEDICINE
Payer: COMMERCIAL

## 2021-01-21 DIAGNOSIS — Z12.31 VISIT FOR SCREENING MAMMOGRAM: ICD-10-CM

## 2021-01-21 PROCEDURE — 77067 SCR MAMMO BI INCL CAD: CPT

## 2021-01-25 RX ORDER — LISINOPRIL 10 MG/1
TABLET ORAL
Qty: 90 TAB | Refills: 0 | Status: SHIPPED | OUTPATIENT
Start: 2021-01-25 | End: 2021-04-26

## 2021-01-25 NOTE — TELEPHONE ENCOUNTER
Phone Number Called: [phone] (home)     Call outcome: Left detailed message for patient. Informed to call back with any additional questions.    Message: LVM about scheduling an appt for further refills.

## 2021-02-16 RX ORDER — RALOXIFENE HYDROCHLORIDE 60 MG/1
TABLET, FILM COATED ORAL
Qty: 90 TABLET | Refills: 0 | Status: SHIPPED | OUTPATIENT
Start: 2021-02-16 | End: 2021-05-17

## 2021-03-15 DIAGNOSIS — Z23 NEED FOR VACCINATION: ICD-10-CM

## 2021-03-26 ENCOUNTER — OFFICE VISIT (OUTPATIENT)
Dept: MEDICAL GROUP | Facility: PHYSICIAN GROUP | Age: 66
End: 2021-03-26
Payer: COMMERCIAL

## 2021-03-26 VITALS
HEIGHT: 66 IN | DIASTOLIC BLOOD PRESSURE: 100 MMHG | SYSTOLIC BLOOD PRESSURE: 140 MMHG | HEART RATE: 80 BPM | BODY MASS INDEX: 30.02 KG/M2 | OXYGEN SATURATION: 94 % | RESPIRATION RATE: 16 BRPM | WEIGHT: 186.8 LBS | TEMPERATURE: 98.2 F

## 2021-03-26 DIAGNOSIS — E11.9 TYPE 2 DIABETES MELLITUS WITHOUT COMPLICATION, WITHOUT LONG-TERM CURRENT USE OF INSULIN (HCC): ICD-10-CM

## 2021-03-26 DIAGNOSIS — B07.0 PLANTAR WART: ICD-10-CM

## 2021-03-26 DIAGNOSIS — Z12.83 SKIN CANCER SCREENING: ICD-10-CM

## 2021-03-26 DIAGNOSIS — Z11.59 NEED FOR HEPATITIS C SCREENING TEST: ICD-10-CM

## 2021-03-26 DIAGNOSIS — Z00.00 WELLNESS EXAMINATION: Primary | ICD-10-CM

## 2021-03-26 PROCEDURE — 99396 PREV VISIT EST AGE 40-64: CPT | Mod: 25 | Performed by: FAMILY MEDICINE

## 2021-03-26 PROCEDURE — 17110 DESTRUCTION B9 LES UP TO 14: CPT | Performed by: FAMILY MEDICINE

## 2021-03-26 ASSESSMENT — PATIENT HEALTH QUESTIONNAIRE - PHQ9: CLINICAL INTERPRETATION OF PHQ2 SCORE: 0

## 2021-03-26 ASSESSMENT — FIBROSIS 4 INDEX: FIB4 SCORE: 0.94

## 2021-03-26 NOTE — LETTER
[facility]  Arlin Cota M.D.  [address]  [place] [address]  Fax: [phone]   Authorization for Release/Disclosure of   Protected Health Information   Name: RADHA MCQUEEN : 1955 SSN: [id number]   Address: [facility] [address]   [place] [address] Phone:    [phone] (home)    I authorize the entity listed below to release/disclose the PHI below to:   [facility]/Arlin Cota M.D. and Arlin Cota M.D.   Provider or Entity Name:  [facility]   Address   [facility], [address]            [address], [place] [address] Phone:  [phone]      Fax:       [phone]          Reason for request: continuity of care   Information to be released:    [ X ] LAST COLONOSCOPY,  including any PATH REPORT and follow-up  [ X ] LAST FIT/COLOGUARD RESULT [  ] LAST DEXA  [  ] LAST MAMMOGRAM  [  ] LAST PAP  [  ] LAST LABS [  ] RETINA EXAM REPORT  [  ] IMMUNIZATION RECORDS  [  ] Release all info      [  ] Check here and initial the line next to each item to release ALL health information INCLUDING  _____ Care and treatment for drug and / or alcohol abuse  _____ HIV testing, infection status, or AIDS  _____ Genetic Testing    DATES OF SERVICE OR TIME PERIOD TO BE DISCLOSED: _____________  I understand and acknowledge that:  * This Authorization may be revoked at any time by you in writing, except if your health information has already been used or disclosed.  * Your health information that will be used or disclosed as a result of you signing this authorization could be re-disclosed by the recipient. If this occurs, your re-disclosed health information may no longer be protected by State or Federal laws.  * You may refuse to sign this Authorization. Your refusal will not affect your ability to obtain treatment.  * This Authorization becomes effective upon signing and will  on (date) __________.      If no date is indicated, this Authorization will  one (1) year  from the signature date.    Name: Christa Ramirez    Signature:   Date:     3/26/2021       PLEASE FAX REQUESTED RECORDS BACK TO: [phone]

## 2021-03-26 NOTE — LETTER
[facility]  Arlin Cota M.D.  [address]  [place] [address]  Fax: [phone]   Authorization for Release/Disclosure of   Protected Health Information   Name: RADAH MCQUEEN : 1955 SSN: [id number]   Address: [facility] [address]   [place] [address] Phone:    [phone] (home)    I authorize the entity listed below to release/disclose the PHI below to:   [facility]/Arlin Cota M.D. and Arlin Cota M.D.   Provider or Entity Name:  {[facility] COLORECTAL SCREENING LOCATIONS:[id number]}   Reason for request: continuity of care   Information to be released:    [ X ] LAST COLONOSCOPY,  including any PATH REPORT and follow-up  [ X ] LAST FIT/COLOGUARD RESULT [  ] LAST DEXA  [  ] LAST MAMMOGRAM  [  ] LAST PAP  [  ] LAST LABS [  ] RETINA EXAM REPORT  [  ] IMMUNIZATION RECORDS  [  ] Release all info      [  ] Check here and initial the line next to each item to release ALL health information INCLUDING  _____ Care and treatment for drug and / or alcohol abuse  _____ HIV testing, infection status, or AIDS  _____ Genetic Testing    DATES OF SERVICE OR TIME PERIOD TO BE DISCLOSED: _____________  I understand and acknowledge that:  * This Authorization may be revoked at any time by you in writing, except if your health information has already been used or disclosed.  * Your health information that will be used or disclosed as a result of you signing this authorization could be re-disclosed by the recipient. If this occurs, your re-disclosed health information may no longer be protected by State or Federal laws.  * You may refuse to sign this Authorization. Your refusal will not affect your ability to obtain treatment.  * This Authorization becomes effective upon signing and will  on (date) __________.      If no date is indicated, this Authorization will  one (1) year from the signature date.    Name: Radha Mcqueen    Signature:   Date:     3/26/2021       PLEASE FAX  REQUESTED RECORDS BACK TO: [phone]

## 2021-03-26 NOTE — PROGRESS NOTES
Subjective:     CC:   Chief Complaint   Patient presents with   • Annual Exam   • Medication Refill     hydrocortisone refill to [facility]        HPI:   Christa Ramirez is a 64 y.o. female who presents for annual exam. She is feeling well and denies any complaints.    Ob-Gyn/ History:    Patient has GYN provider: no  /Para:  5/4  Last Pap Smear:  . No history of abnormal pap smears.  Gyn Surgery:  hysterectomy.  Current Contraceptive Method:  N/a. Yes currently sexually active.  Last menstrual period:  N/a.  No significant bloating/fluid retention, pelvic pain, or dyspareunia. No vaginal discharge  Post-menopausal bleeding: no  Urinary incontinence: continuous leakage from prolapse  Folate intake: n/a     Health Maintenance  Advanced directive: n/a   Osteoporosis Screen/ DEXA: n/a   PT/vit D for falls prevention: n/a   Cholesterol Screenin2020 - T chol 155, , HDL 53, LDL 76; rosuvastatin   Diabetes Screening: n/a - has DM   Aspirin Use: yes    Diet: healthy   Exercise: regular - recumbent bike, walk, swim in summer   Substance Abuse: no   Safe in relationship.   Seat belts, bike helmet, gun safety discussed.  Sun protection used.    Cancer screening  Colorectal Cancer Screening: due    Lung Cancer Screening: n/a - never smoker  Cervical Cancer Screening: n/a - hysterectomy   Breast Cancer Screening: due     Infectious disease screening/Immunizations  --STI Screening: declined   --Practices safe sex.  --HIV Screening: declined   --Hepatitis C Screening: ordered  --Immunizations:    Influenza: completed    HPV:  n/a    Tetanus: due     Shingles: completed    Pneumococcal : n/a     Other immunizations: n/a    She  has a past medical history of Cancer (HCC), Diabetes (HCC), Frequent UTI, HEMORRHOIDS, Hyperlipidemia, Hypertension, Neoplasm of uncertain behavior of skin (2017), OSTEOPOROSIS, and Thyroid disease.  She  has a past surgical history that includes bladder  suspension; colonoscopy (2007); knee arthroplasty total; appendectomy; tonsillectomy; and abdominal hysterectomy total.    Family History   Problem Relation Age of Onset   • Heart Disease Mother    • Hyperlipidemia Brother    • Heart Disease Brother 65        MI   • Heart Disease Maternal Grandmother    • Hyperlipidemia Maternal Grandmother    • Cancer Neg Hx        Social History     Socioeconomic History   • Marital status:      Spouse name: Not on file   • Number of children: Not on file   • Years of education: Not on file   • Highest education level: Not on file   Occupational History   • Not on file   Tobacco Use   • Smoking status: Never Smoker   • Smokeless tobacco: Never Used   Substance and Sexual Activity   • Alcohol use: No   • Drug use: No   • Sexual activity: Yes     Partners: Male     Comment: , 4 children, teacher @ [employer]   Other Topics Concern   • Not on file   Social History Narrative   • Not on file     Social Determinants of Health     Financial Resource Strain:    • Difficulty of Paying Living Expenses:    Food Insecurity:    • Worried About Running Out of Food in the Last Year:    • Ran Out of Food in the Last Year:    Transportation Needs:    • Lack of Transportation (Medical):    • Lack of Transportation (Non-Medical):    Physical Activity:    • Days of Exercise per Week:    • Minutes of Exercise per Session:    Stress:    • Feeling of Stress :    Social Connections:    • Frequency of Communication with Friends and Family:    • Frequency of Social Gatherings with Friends and Family:    • Attends [employer] Services:    • Active Member of Clubs or Organizations:    • Attends Club or Organization Meetings:    • Marital Status:    Intimate Partner Violence:    • Fear of Current or Ex-Partner:    • Emotionally Abused:    • Physically Abused:    • Sexually Abused:        Patient Active Problem List    Diagnosis Date Noted   • Palpitation 10/11/2019   • Chronic pain of right knee  "11/10/2017   • Vitamin D deficiency 11/10/2017   • Osteopenia 02/16/2016   • Hypothyroid 11/18/2012   • Type 2 diabetes mellitus without complication (HCC) 11/18/2012   • Obesity (BMI 30-39.9) 11/18/2012   • Hypertension    • Hyperlipidemia    • Frequent UTI    • Hemorrhoid          Current Outpatient Medications   Medication Sig Dispense Refill   • metFORMIN ER (GLUCOPHAGE XR) 500 MG TABLET SR 24 HR Take 2 tablets by mouth twice daily 360 tablet 0   • raloxifene (EVISTA) 60 MG Tab Take 1 tablet by mouth once daily 90 tablet 0   • lisinopril (PRINIVIL) 10 MG Tab Take 1 tablet by mouth once daily 90 Tab 0   • rosuvastatin (CRESTOR) 40 MG tablet Take 1 tablet by mouth once daily 90 Tab 3   • levothyroxine (SYNTHROID) 112 MCG Tab Take 1 tablet by mouth once daily 90 Tab 3   • olopatadine HCl (PATADAY) 0.2 % ophthalmic solution INSTILL 1 DROP INTO EACH EYE ONCE DAILY  8   • CALCIUM-VITAMIN D PO Take  by mouth.     • Multiple Vitamins-Minerals (MULTIVITAMIN PO) Take  by mouth.     • hydrocortisone rectal (PROCTOCREAM-HC) 2.5 % Cream Apply bid prn 1 Tube 3   • aspirin EC (ECOTRIN) 81 MG Tablet Delayed Response Take 1 Tab by mouth every day. 30 Tab      No current facility-administered medications for this visit.     Allergies   Allergen Reactions   • Other Food Anaphylaxis     Walnuts    • Boniva [Ibandronic Acid]      \"right side numbness\"       Review of Systems   Constitutional: Negative for fever, chills.   HENT: Negative for congestion.    Eyes: Negative for pain.   Respiratory: Negative for cough.    Cardiovascular: Negative for leg swelling.   Gastrointestinal: Negative for abdominal pain.   Genitourinary: Negative for dysuria.   Skin: Negative for rash.   Neurological: Negative for dizziness.   Endo/Heme/Allergies: Does not bleed easily.   Psychiatric/Behavioral: Negative for depression.  The patient is not nervous/anxious.      Objective:     /100 (BP Location: Left arm, Patient Position: Sitting, BP Cuff " "Size: Adult)   Pulse 80   Temp 36.8 °C (98.2 °F) (Temporal)   Resp 16   Ht 1.676 m (5' 6\")   Wt 84.7 kg (186 lb 12.8 oz)   SpO2 94%   BMI 30.15 kg/m²   Body mass index is 30.15 kg/m².  Wt Readings from Last 4 Encounters:   03/26/21 84.7 kg (186 lb 12.8 oz)   03/20/20 85.5 kg (188 lb 9.6 oz)   12/01/19 82.6 kg (182 lb)   11/28/19 83.2 kg (183 lb 6.4 oz)       Physical Exam:  Constitutional: Well-developed and well-nourished. Not diaphoretic. No distress.   Skin: Skin is warm and dry. No rash noted. Plantar wart on dorsum of right hand.  Head: Atraumatic without lesions.  Eyes: Conjunctivae and extraocular motions are normal. Pupils are equal, round, and reactive to light. No scleral icterus.   Ears:  External ears unremarkable. Tympanic membrane clear and intact on right. Impacted cerumen on left.  Mouth/Throat: Dentition is good. Tongue normal. Oropharynx is clear and moist. Posterior pharynx without erythema or exudates.  Neck: Supple, trachea midline. Normal range of motion. No thyromegaly present. No lymphadenopathy--cervical or supraclavicular.  Cardiovascular: Regular rate and rhythm, S1 and S2 without murmur, rubs, or gallops.  Lungs: Normal inspiratory effort, CTA bilaterally, no wheezes/rhonchi/rales  Abdomen: Soft, non tender, and without distention. Active bowel sounds in all four quadrants. No rebound, guarding, masses or HSM.  Extremities: No cyanosis, clubbing, erythema, nor edema. Distal pulses intact and symmetric.   Musculoskeletal: All major joints AROM full in all directions without pain.  Neurological: Alert and oriented x 3. DTRs 2+/3 and symmetric. No cranial nerve deficit. 5/5 myotomes. Sensation intact.   Psychiatric:  Behavior, mood, and affect are appropriate.    Assessment and Plan:     1. Wellness examination  Patient identified as having weight management issue.  Appropriate orders and counseling given.   2. Type 2 diabetes mellitus without complication, without long-term current " use of insulin (HCC)  HEMOGLOBIN A1C   3. Need for hepatitis C screening test  HCV Scrn ( 2325-1314 1xLife)   4. Skin cancer screening  REFERRAL TO DERMATOLOGY   5. Plantar wart       HCM:  Up to date   Labs per orders  Immunizations per orders  Patient counseled about skin care, diet, supplements, prenatal vitamins, safe sex and exercise.    Elevated BP  She will monitor at home and if consistently >140/90 she'll follow up.    Follow-up: Return in about 6 months (around 2021) for f/u DM, get A1c.

## 2021-03-26 NOTE — LETTER
[facility]  Arlin Cota M.D.  [address]  [place] [address]  Fax: [phone]   Authorization for Release/Disclosure of   Protected Health Information   Name: RADHA MCQUEEN : 1955 SSN: [id number]   Address: [facility] [address]   [place] [address] Phone:    [phone] (home)    I authorize the entity listed below to release/disclose the PHI below to:   [facility]/Arlin Cota M.D. and Arlin Cota M.D.   Provider or Entity Name:  {[facility] COLORECTAL SCREENING LOCATIONS:[id number]}   Reason for request: continuity of care   Information to be released:    [ X ] LAST COLONOSCOPY,  including any PATH REPORT and follow-up  [ X ] LAST FIT/COLOGUARD RESULT [  ] LAST DEXA  [  ] LAST MAMMOGRAM  [  ] LAST PAP  [  ] LAST LABS [  ] RETINA EXAM REPORT  [  ] IMMUNIZATION RECORDS  [  ] Release all info      [  ] Check here and initial the line next to each item to release ALL health information INCLUDING  _____ Care and treatment for drug and / or alcohol abuse  _____ HIV testing, infection status, or AIDS  _____ Genetic Testing    DATES OF SERVICE OR TIME PERIOD TO BE DISCLOSED: _____________  I understand and acknowledge that:  * This Authorization may be revoked at any time by you in writing, except if your health information has already been used or disclosed.  * Your health information that will be used or disclosed as a result of you signing this authorization could be re-disclosed by the recipient. If this occurs, your re-disclosed health information may no longer be protected by State or Federal laws.  * You may refuse to sign this Authorization. Your refusal will not affect your ability to obtain treatment.  * This Authorization becomes effective upon signing and will  on (date) __________.      If no date is indicated, this Authorization will  one (1) year from the signature date.    Name: Radha Mcqueen    Signature:   Date:     3/26/2021       PLEASE FAX  REQUESTED RECORDS BACK TO: [phone]

## 2021-03-26 NOTE — LETTER
[facility]  Arlin Cota M.D.  [address]  [place] [address]  Fax: [phone]   Authorization for Release/Disclosure of   Protected Health Information   Name: RADHA MCQUEEN : 1955 SSN: [id number]   Address: [facility] [address]   [place] [address] Phone:    [phone] (home)    I authorize the entity listed below to release/disclose the PHI below to:   [facility]/Arlin Cota M.D. and Arlin Cota M.D.   Provider or Entity Name:  [facility]    Address   City, State, Zip   Phone:      Fax:     Reason for request: continuity of care   Information to be released:    [  ] LAST COLONOSCOPY,  including any PATH REPORT and follow-up  [  ] LAST FIT/COLOGUARD RESULT [  ] LAST DEXA  [  ] LAST MAMMOGRAM  [  ] LAST PAP  [  ] LAST LABS [X] RETINA EXAM REPORT  [  ] IMMUNIZATION RECORDS  [  ] Release all info      [  ] Check here and initial the line next to each item to release ALL health information INCLUDING  _____ Care and treatment for drug and / or alcohol abuse  _____ HIV testing, infection status, or AIDS  _____ Genetic Testing    DATES OF SERVICE OR TIME PERIOD TO BE DISCLOSED: _____________  I understand and acknowledge that:  * This Authorization may be revoked at any time by you in writing, except if your health information has already been used or disclosed.  * Your health information that will be used or disclosed as a result of you signing this authorization could be re-disclosed by the recipient. If this occurs, your re-disclosed health information may no longer be protected by State or Federal laws.  * You may refuse to sign this Authorization. Your refusal will not affect your ability to obtain treatment.  * This Authorization becomes effective upon signing and will  on (date) __________.      If no date is indicated, this Authorization will  one (1) year from the signature date.    Name: Radha Mcqueen    Signature:   Date:          3/26/2021       PLEASE FAX REQUESTED RECORDS BACK TO: [phone]

## 2021-03-26 NOTE — PROCEDURES
CRYOTHERAPY:  Discussed the benign nature of these lesions.  After discussing risks and benefits, verbal consent was obtained and cryotherapy performed using liquid nitrogen, freeze-thaw-freeze technique x 3.  Patient tolerated the procedure well.  Aftercare as well as potential for blistering was discussed.  I explained that the procedure may need to be repeated if there is not complete resolution.

## 2021-04-21 ENCOUNTER — HOSPITAL ENCOUNTER (OUTPATIENT)
Dept: LAB | Facility: MEDICAL CENTER | Age: 66
End: 2021-04-21
Attending: FAMILY MEDICINE
Payer: COMMERCIAL

## 2021-04-21 DIAGNOSIS — Z11.59 NEED FOR HEPATITIS C SCREENING TEST: ICD-10-CM

## 2021-04-21 DIAGNOSIS — E11.9 TYPE 2 DIABETES MELLITUS WITHOUT COMPLICATION, WITHOUT LONG-TERM CURRENT USE OF INSULIN (HCC): ICD-10-CM

## 2021-04-21 PROCEDURE — G0472 HEP C SCREEN HIGH RISK/OTHER: HCPCS

## 2021-04-21 PROCEDURE — 83036 HEMOGLOBIN GLYCOSYLATED A1C: CPT

## 2021-04-21 PROCEDURE — 36415 COLL VENOUS BLD VENIPUNCTURE: CPT

## 2021-04-22 LAB
EST. AVERAGE GLUCOSE BLD GHB EST-MCNC: 183 MG/DL
HBA1C MFR BLD: 8 % (ref 4–5.6)
HCV AB SER QL: NORMAL

## 2021-05-06 ENCOUNTER — NON-PROVIDER VISIT (OUTPATIENT)
Dept: MEDICAL GROUP | Facility: PHYSICIAN GROUP | Age: 66
End: 2021-05-06
Payer: COMMERCIAL

## 2021-05-06 PROCEDURE — 99999 PR NO CHARGE: CPT | Performed by: FAMILY MEDICINE

## 2021-05-06 NOTE — NON-PROVIDER
Christa Ramirez is a 65 y.o. female here for a non-provider visit for cryotherapy wart removal, ok per PCP Dr. Cota.      Cryotherapy used, freeze-thaw-freeze technique x3 on wart on dorsum of left hand. Pt tolerated well. Pt advised to call office back for any questions or concerns. Pt verbalized understanding.

## 2021-05-17 RX ORDER — RALOXIFENE HYDROCHLORIDE 60 MG/1
TABLET, FILM COATED ORAL
Qty: 90 TABLET | Refills: 0 | Status: SHIPPED | OUTPATIENT
Start: 2021-05-17 | End: 2021-08-23

## 2021-06-08 RX ORDER — METFORMIN HYDROCHLORIDE 500 MG/1
TABLET, EXTENDED RELEASE ORAL
Qty: 360 TABLET | Refills: 0 | Status: SHIPPED | OUTPATIENT
Start: 2021-06-08 | End: 2021-09-27

## 2021-07-02 ENCOUNTER — OFFICE VISIT (OUTPATIENT)
Dept: MEDICAL GROUP | Facility: PHYSICIAN GROUP | Age: 66
End: 2021-07-02
Payer: COMMERCIAL

## 2021-07-02 VITALS
WEIGHT: 190.2 LBS | TEMPERATURE: 98.6 F | SYSTOLIC BLOOD PRESSURE: 132 MMHG | RESPIRATION RATE: 16 BRPM | HEIGHT: 66 IN | HEART RATE: 82 BPM | OXYGEN SATURATION: 98 % | BODY MASS INDEX: 30.57 KG/M2 | DIASTOLIC BLOOD PRESSURE: 88 MMHG

## 2021-07-02 DIAGNOSIS — Z86.39 HISTORY OF THYROID NODULE: ICD-10-CM

## 2021-07-02 DIAGNOSIS — E11.9 TYPE 2 DIABETES MELLITUS WITHOUT COMPLICATION, WITHOUT LONG-TERM CURRENT USE OF INSULIN (HCC): ICD-10-CM

## 2021-07-02 DIAGNOSIS — E06.3 HYPOTHYROIDISM DUE TO HASHIMOTO'S THYROIDITIS: ICD-10-CM

## 2021-07-02 DIAGNOSIS — I10 ESSENTIAL HYPERTENSION: ICD-10-CM

## 2021-07-02 DIAGNOSIS — J30.2 SEASONAL ALLERGIES: ICD-10-CM

## 2021-07-02 DIAGNOSIS — E03.8 HYPOTHYROIDISM DUE TO HASHIMOTO'S THYROIDITIS: ICD-10-CM

## 2021-07-02 PROBLEM — F32.A DEPRESSION: Status: RESOLVED | Noted: 2021-07-02 | Resolved: 2021-07-02

## 2021-07-02 PROBLEM — F32.A DEPRESSION: Status: ACTIVE | Noted: 2021-07-02

## 2021-07-02 PROCEDURE — 99214 OFFICE O/P EST MOD 30 MIN: CPT | Performed by: FAMILY MEDICINE

## 2021-07-02 ASSESSMENT — FIBROSIS 4 INDEX: FIB4 SCORE: 0.96

## 2021-07-02 NOTE — ASSESSMENT & PLAN NOTE
This is a chronic condition.  She is concerned about her thyroid today as she has been noticing a sore throat and some soreness in her neck as well as fatigue.  She also notes 3 years ago she had a LifeScan screening found a nodule on her thyroid.  She reports that there was a biopsy that was negative.

## 2021-07-02 NOTE — ASSESSMENT & PLAN NOTE
Sneezing, voice hoarseness, and fatigue. Has used Leo antihistamine daily. Struggling for 2 months.

## 2021-07-02 NOTE — PROGRESS NOTES
"Subjective:     CC: thyroid concern    HPI:   Christa presents today with     Seasonal allergies  Sneezing, voice hoarseness, and fatigue. Has used Leo antihistamine daily. Struggling for 2 months.     Hypothyroid  This is a chronic condition.  She is concerned about her thyroid today as she has been noticing a sore throat and some soreness in her neck as well as fatigue.  She also notes 3 years ago she had a LifeScan screening found a nodule on her thyroid.  She reports that there was a biopsy that was negative.      Current Outpatient Medications Ordered in Epic   Medication Sig Dispense Refill   • metFORMIN ER (GLUCOPHAGE XR) 500 MG TABLET SR 24 HR Take 2 tablets by mouth twice daily 360 tablet 0   • raloxifene (EVISTA) 60 MG Tab Take 1 tablet by mouth once daily 90 tablet 0   • hydrocortisone 2.5 % Cream topical cream Apply 1 Application topically 2 times a day as needed. 20 g 0   • lisinopril (PRINIVIL) 10 MG Tab Take 1 tablet by mouth once daily 90 tablet 1   • rosuvastatin (CRESTOR) 40 MG tablet Take 1 tablet by mouth once daily 90 Tab 3   • levothyroxine (SYNTHROID) 112 MCG Tab Take 1 tablet by mouth once daily 90 Tab 3   • olopatadine HCl (PATADAY) 0.2 % ophthalmic solution INSTILL 1 DROP INTO EACH EYE ONCE DAILY  8   • CALCIUM-VITAMIN D PO Take  by mouth.     • Multiple Vitamins-Minerals (MULTIVITAMIN PO) Take  by mouth.     • hydrocortisone rectal (PROCTOCREAM-HC) 2.5 % Cream Apply bid prn 1 Tube 3   • aspirin EC (ECOTRIN) 81 MG Tablet Delayed Response Take 1 Tab by mouth every day. 30 Tab      No current Epic-ordered facility-administered medications on file.       Health Maintenance: Completed    ROS:  Pulm: no sob  CV: no chest pain    Objective:     Exam:  /88 (BP Location: Left arm, Patient Position: Sitting, BP Cuff Size: Adult)   Pulse 82   Temp 37 °C (98.6 °F) (Temporal)   Resp 16   Ht 1.676 m (5' 6\")   Wt 86.3 kg (190 lb 3.2 oz)   SpO2 98%   BMI 30.70 kg/m²  Body mass index is " 30.7 kg/m².    Gen: Alert and oriented, No apparent distress.  Neck: Neck is supple. Superior, anterior lymph node tenderness but no enlargement. Thyroid non-tender and not enlarged.  Lungs: Normal effort, CTA bilaterally, no wheezes, rhonchi, or rales  CV: Regular rate and rhythm. No murmurs, rubs, or gallops.  Ext: No clubbing, cyanosis, edema.    Assessment & Plan:     66 y.o. female with the following -     1. Hypothyroidism due to Hashimoto's thyroiditis  2. History of thyroid nodule  This is a chronic condition, stable.  She has had hypothyroidism for years and labs year ago did show her dose was appropriate.  However, she is concerned about her thyroid today as she is noticing some voice hoarseness, soreness in her neck, and fatigue.  She does report a history of a thyroid nodule approximately 3 years ago with a negative biopsy.  Reviewing through the chart it appears in 2013 was when the nodules noted and she had a negative biopsy.  Due to her concern of voice hoarseness and his history of thyroid nodule get an ultrasound to reevaluate.  In the meantime, we will also get labs to see if her dosing is still appropriate.  - TSH WITH REFLEX TO FT4; Future  - US-THYROID; Future    3. Seasonal allergies  This is a chronic condition, uncontrolled.  She does have history of allergies in the last 2 months she reports it has been uncontrolled.  She is getting a lot of sneezing.  She also notes some soreness in her neck and she is a little tender when you palpate her superior anterior cervical lymph nodes though there is no enlargement.  She is currently using Leo antihistamine and I have recommended she add Flonase to the regimen as it may work better for her.    4. Essential hypertension  This is a chronic condition, stable.  She is due for yearly labs which have been ordered.  - Comp Metabolic Panel; Future  - CBC WITHOUT DIFFERENTIAL; Future    5. Type 2 diabetes mellitus without complication, without  long-term current use of insulin (HCC)  This is a chronic condition, controlled.  She is due for yearly labs regarding her diabetes which have been ordered.  - Lipid Profile; Future  - MICROALBUMIN CREAT RATIO URINE; Future    Return if symptoms worsen or fail to improve.    Please note that this dictation was created using voice recognition software. I have made every reasonable attempt to correct obvious errors, but I expect that there are errors of grammar and possibly content that I did not discover before finalizing the note.

## 2021-07-09 ENCOUNTER — HOSPITAL ENCOUNTER (OUTPATIENT)
Dept: LAB | Facility: MEDICAL CENTER | Age: 66
End: 2021-07-09
Attending: FAMILY MEDICINE
Payer: COMMERCIAL

## 2021-07-09 DIAGNOSIS — E11.9 TYPE 2 DIABETES MELLITUS WITHOUT COMPLICATION, WITHOUT LONG-TERM CURRENT USE OF INSULIN (HCC): ICD-10-CM

## 2021-07-09 DIAGNOSIS — I10 ESSENTIAL HYPERTENSION: ICD-10-CM

## 2021-07-09 DIAGNOSIS — E06.3 HYPOTHYROIDISM DUE TO HASHIMOTO'S THYROIDITIS: ICD-10-CM

## 2021-07-09 DIAGNOSIS — E03.8 HYPOTHYROIDISM DUE TO HASHIMOTO'S THYROIDITIS: ICD-10-CM

## 2021-07-09 LAB
ALBUMIN SERPL BCP-MCNC: 4.1 G/DL (ref 3.2–4.9)
ALBUMIN/GLOB SERPL: 1.5 G/DL
ALP SERPL-CCNC: 46 U/L (ref 30–99)
ALT SERPL-CCNC: 9 U/L (ref 2–50)
ANION GAP SERPL CALC-SCNC: 13 MMOL/L (ref 7–16)
AST SERPL-CCNC: 17 U/L (ref 12–45)
BILIRUB SERPL-MCNC: 0.3 MG/DL (ref 0.1–1.5)
BUN SERPL-MCNC: 18 MG/DL (ref 8–22)
CALCIUM SERPL-MCNC: 9.6 MG/DL (ref 8.5–10.5)
CHLORIDE SERPL-SCNC: 103 MMOL/L (ref 96–112)
CHOLEST SERPL-MCNC: 147 MG/DL (ref 100–199)
CO2 SERPL-SCNC: 25 MMOL/L (ref 20–33)
CREAT SERPL-MCNC: 0.69 MG/DL (ref 0.5–1.4)
CREAT UR-MCNC: 63.29 MG/DL
ERYTHROCYTE [DISTWIDTH] IN BLOOD BY AUTOMATED COUNT: 50.2 FL (ref 35.9–50)
FASTING STATUS PATIENT QL REPORTED: NORMAL
GLOBULIN SER CALC-MCNC: 2.7 G/DL (ref 1.9–3.5)
GLUCOSE SERPL-MCNC: 137 MG/DL (ref 65–99)
HCT VFR BLD AUTO: 44.1 % (ref 37–47)
HDLC SERPL-MCNC: 55 MG/DL
HGB BLD-MCNC: 13.7 G/DL (ref 12–16)
LDLC SERPL CALC-MCNC: 70 MG/DL
MCH RBC QN AUTO: 27.8 PG (ref 27–33)
MCHC RBC AUTO-ENTMCNC: 31.1 G/DL (ref 33.6–35)
MCV RBC AUTO: 89.5 FL (ref 81.4–97.8)
MICROALBUMIN UR-MCNC: 1.7 MG/DL
MICROALBUMIN/CREAT UR: 27 MG/G (ref 0–30)
PLATELET # BLD AUTO: 322 K/UL (ref 164–446)
PMV BLD AUTO: 9.2 FL (ref 9–12.9)
POTASSIUM SERPL-SCNC: 4.3 MMOL/L (ref 3.6–5.5)
PROT SERPL-MCNC: 6.8 G/DL (ref 6–8.2)
RBC # BLD AUTO: 4.93 M/UL (ref 4.2–5.4)
SODIUM SERPL-SCNC: 141 MMOL/L (ref 135–145)
TRIGL SERPL-MCNC: 112 MG/DL (ref 0–149)
TSH SERPL DL<=0.005 MIU/L-ACNC: 0.38 UIU/ML (ref 0.38–5.33)
WBC # BLD AUTO: 6.9 K/UL (ref 4.8–10.8)

## 2021-07-09 PROCEDURE — 80053 COMPREHEN METABOLIC PANEL: CPT

## 2021-07-09 PROCEDURE — 82570 ASSAY OF URINE CREATININE: CPT

## 2021-07-09 PROCEDURE — 84443 ASSAY THYROID STIM HORMONE: CPT

## 2021-07-09 PROCEDURE — 85027 COMPLETE CBC AUTOMATED: CPT

## 2021-07-09 PROCEDURE — 82043 UR ALBUMIN QUANTITATIVE: CPT

## 2021-07-09 PROCEDURE — 36415 COLL VENOUS BLD VENIPUNCTURE: CPT

## 2021-07-09 PROCEDURE — 80061 LIPID PANEL: CPT

## 2021-08-05 DIAGNOSIS — E78.49 OTHER HYPERLIPIDEMIA: ICD-10-CM

## 2021-08-05 RX ORDER — ROSUVASTATIN CALCIUM 40 MG/1
TABLET, COATED ORAL
Qty: 90 TABLET | Refills: 3 | Status: SHIPPED | OUTPATIENT
Start: 2021-08-05 | End: 2022-06-13

## 2021-08-08 DIAGNOSIS — E03.1 CONGENITAL HYPOTHYROIDISM: ICD-10-CM

## 2021-08-09 RX ORDER — LEVOTHYROXINE SODIUM 112 UG/1
TABLET ORAL
Qty: 90 TABLET | Refills: 3 | Status: SHIPPED | OUTPATIENT
Start: 2021-08-09 | End: 2022-07-18

## 2021-08-24 RX ORDER — RALOXIFENE HYDROCHLORIDE 60 MG/1
TABLET, FILM COATED ORAL
Qty: 90 TABLET | Refills: 3 | Status: SHIPPED | OUTPATIENT
Start: 2021-08-24 | End: 2022-03-28

## 2021-09-27 RX ORDER — METFORMIN HYDROCHLORIDE 500 MG/1
TABLET, EXTENDED RELEASE ORAL
Qty: 360 TABLET | Refills: 3 | Status: SHIPPED | OUTPATIENT
Start: 2021-09-27 | End: 2022-11-21

## 2021-10-25 ENCOUNTER — OFFICE VISIT (OUTPATIENT)
Dept: MEDICAL GROUP | Facility: PHYSICIAN GROUP | Age: 66
End: 2021-10-25
Payer: COMMERCIAL

## 2021-10-25 VITALS
DIASTOLIC BLOOD PRESSURE: 86 MMHG | RESPIRATION RATE: 16 BRPM | SYSTOLIC BLOOD PRESSURE: 126 MMHG | BODY MASS INDEX: 29.44 KG/M2 | TEMPERATURE: 97.4 F | HEIGHT: 66 IN | OXYGEN SATURATION: 94 % | WEIGHT: 183.2 LBS | HEART RATE: 80 BPM

## 2021-10-25 DIAGNOSIS — I10 PRIMARY HYPERTENSION: ICD-10-CM

## 2021-10-25 DIAGNOSIS — Z23 NEED FOR VACCINATION: ICD-10-CM

## 2021-10-25 DIAGNOSIS — E11.9 TYPE 2 DIABETES MELLITUS WITHOUT COMPLICATION, WITHOUT LONG-TERM CURRENT USE OF INSULIN (HCC): Primary | ICD-10-CM

## 2021-10-25 DIAGNOSIS — R39.15 URINARY URGENCY: ICD-10-CM

## 2021-10-25 LAB
APPEARANCE UR: NORMAL
BILIRUB UR STRIP-MCNC: NEGATIVE MG/DL
COLOR UR AUTO: NORMAL
GLUCOSE UR STRIP.AUTO-MCNC: NEGATIVE MG/DL
HBA1C MFR BLD: 7.8 % (ref 0–5.6)
INT CON NEG: ABNORMAL
INT CON POS: ABNORMAL
KETONES UR STRIP.AUTO-MCNC: NEGATIVE MG/DL
LEUKOCYTE ESTERASE UR QL STRIP.AUTO: NORMAL
NITRITE UR QL STRIP.AUTO: NEGATIVE
PH UR STRIP.AUTO: 5 [PH] (ref 5–8)
PROT UR QL STRIP: NORMAL MG/DL
RBC UR QL AUTO: NEGATIVE
SP GR UR STRIP.AUTO: 1.02
UROBILINOGEN UR STRIP-MCNC: NORMAL MG/DL

## 2021-10-25 PROCEDURE — 90662 IIV NO PRSV INCREASED AG IM: CPT | Performed by: FAMILY MEDICINE

## 2021-10-25 PROCEDURE — 99214 OFFICE O/P EST MOD 30 MIN: CPT | Mod: 25 | Performed by: FAMILY MEDICINE

## 2021-10-25 PROCEDURE — 81002 URINALYSIS NONAUTO W/O SCOPE: CPT | Performed by: FAMILY MEDICINE

## 2021-10-25 PROCEDURE — 83036 HEMOGLOBIN GLYCOSYLATED A1C: CPT | Performed by: FAMILY MEDICINE

## 2021-10-25 PROCEDURE — 90471 IMMUNIZATION ADMIN: CPT | Performed by: FAMILY MEDICINE

## 2021-10-25 RX ORDER — OXYBUTYNIN CHLORIDE 5 MG/1
5 TABLET, EXTENDED RELEASE ORAL DAILY
Qty: 30 TABLET | Refills: 2 | Status: SHIPPED | OUTPATIENT
Start: 2021-10-25 | End: 2021-12-23

## 2021-10-25 RX ORDER — DULAGLUTIDE 0.75 MG/.5ML
0.5 INJECTION, SOLUTION SUBCUTANEOUS
Qty: 6 ML | Refills: 0 | Status: SHIPPED | OUTPATIENT
Start: 2021-10-25 | End: 2021-11-22 | Stop reason: SDUPTHER

## 2021-10-25 ASSESSMENT — ENCOUNTER SYMPTOMS
CHILLS: 0
FEVER: 0
SHORTNESS OF BREATH: 0

## 2021-10-25 ASSESSMENT — FIBROSIS 4 INDEX: FIB4 SCORE: 1.14

## 2021-10-25 NOTE — PROGRESS NOTES
Subjective:     CC: f/u DM    HPI:   Christa presents today with     Problem   Urinary Urgency    She has been getting increased urinary urgency for years but the last 3 months has been especially bad. She is getting incontinence if she doesn't move fast enough. She has bladder prolapse for years. Tried to have it fixed during a hysterectomy but symptoms recurred within a couple of years. It is progressively worsening. Sometimes dysuria, some increased frequency.      Type 2 Diabetes Mellitus Without Complication (Hcc)    This is a chronic condition.  Current medications:  Insulin: -  Biguanide: metformin ER 1000 mg bid  GLP1-RA: -  SGLT-2i: -  DPP4-I: -  TZD: -  Emperatriz: -  Sulfonyluria: -    Last A1c: 7.8% (10/2021); 8.0% (2021)  Last Microalb/Cr ratio: normal (2021)  Fasting sugars: n/a  Last diabetic foot exam: due  Last retinal eye exam: due  ACEi/ARB: lisinopril 10 mg  Statin: rosuvastatin 40 mg  Aspirin: yes  Concomitant HTN: yes - at goal  Nightly foot checks: yes       Hypertension    This is a chronic condition.  Current Meds: lisinopril 10 mg daily  Side effects: none  Home BP [place]/80s  Associated symptoms: no cp, no sob            Current Outpatient Medications Ordered in Epic   Medication Sig Dispense Refill   • Dulaglutide (TRULICITY) 0.75 MG/0.5ML Solution Pen-injector Inject 0.5 mL under the skin every 7 days. 6 mL 0   • oxybutynin SR (DITROPAN-XL) 5 MG TABLET SR 24 HR Take 1 Tablet by mouth every day. 30 Tablet 2   • metFORMIN ER (GLUCOPHAGE XR) 500 MG TABLET SR 24 HR Take 2 tablets by mouth twice daily 360 Tablet 3   • raloxifene (EVISTA) 60 MG Tab Take 1 tablet by mouth once daily 90 Tablet 3   • EUTHYROX 112 MCG Tab Take 1 tablet by mouth once daily 90 tablet 3   • rosuvastatin (CRESTOR) 40 MG tablet Take 1 tablet by mouth once daily 90 tablet 3   • hydrocortisone 2.5 % Cream topical cream Apply 1 Application topically 2 times a day as needed. 20 g 0   • lisinopril (PRINIVIL) 10 MG Tab Take 1  "tablet by mouth once daily 90 tablet 1   • olopatadine HCl (PATADAY) 0.2 % ophthalmic solution INSTILL 1 DROP INTO EACH EYE ONCE DAILY  8   • CALCIUM-VITAMIN D PO Take  by mouth.     • Multiple Vitamins-Minerals (MULTIVITAMIN PO) Take  by mouth.     • hydrocortisone rectal (PROCTOCREAM-HC) 2.5 % Cream Apply bid prn 1 Tube 3   • aspirin EC (ECOTRIN) 81 MG Tablet Delayed Response Take 1 Tab by mouth every day. 30 Tab      No current Epic-ordered facility-administered medications on file.       Health Maintenance: Completed    ROS:  Review of Systems   Constitutional: Negative for chills and fever.   Respiratory: Negative for shortness of breath.    Cardiovascular: Negative for chest pain.       Objective:     Exam:  /86 (BP Location: Right arm, Patient Position: Sitting, BP Cuff Size: Adult)   Pulse 80   Temp 36.3 °C (97.4 °F) (Temporal)   Resp 16   Ht 1.676 m (5' 6\")   Wt 83.1 kg (183 lb 3.2 oz)   SpO2 94%   BMI 29.57 kg/m²  Body mass index is 29.57 kg/m².    Physical Exam  Constitutional:       Appearance: Normal appearance.   Cardiovascular:      Rate and Rhythm: Normal rate and regular rhythm.      Heart sounds: Normal heart sounds.   Pulmonary:      Effort: Pulmonary effort is normal.      Breath sounds: Normal breath sounds.   Musculoskeletal:      Cervical back: Normal range of motion and neck supple.   Feet:      Comments: Diabetic foot exam: No lesions or calluses noted. 2+ pedal pulses. Sensation intact with 10 out of 10 on monofilament test.    Neurological:      Mental Status: She is alert.       Assessment & Plan:     66 y.o. female with the following -     Problem List Items Addressed This Visit     Hypertension     This is a chronic condition, controlled.  Blood pressure is at goal under 140/90 in the office today.  We will continue the current regimen.  -Lisinopril 10 mg daily         Type 2 diabetes mellitus without complication (HCC) - Primary     This is a chronic condition, " uncontrolled.  Hemoglobin A1c is above 7% though slightly improved compared to 6 months ago.  After discussion regarding add a second agent, and we will add a GLP-1 receptor agonist.  She is on an ACE inhibitor and statin appropriately.  -Continue Metformin ER 1000 mg twice daily  -Start Trulicity 0.75 mg every 7 days         Relevant Medications    Dulaglutide (TRULICITY) 0.75 MG/0.5ML Solution Pen-injector    Other Relevant Orders    POCT Hemoglobin A1C (Completed)    Diabetic Monofilament Lower Extremity Exam (Completed)    Urinary urgency     This is a chronic condition, uncontrolled.  For a year she has had urinary urgency.  She does have a history of a bladder prolapse that was repaired when she had a hysterectomy but it returned within a couple of years.  Over the last few months been especially worse and she is having incontinence when she does not get to the bathroom quickly enough.  She does have a history of recurrent UTIs we checked a UA in the office to be sure there is no UTI.  No obvious UTI was noted so we will start her on oxybutynin for urge incontinence.  -Oxybutynin ER 5 mg daily         Relevant Orders    POCT Urinalysis (Completed)      Other Visit Diagnoses     Need for vaccination        Relevant Orders    INFLUENZA VACCINE, HIGH DOSE (65+ ONLY) (Completed)        Return in about 3 months (around 1/25/2022) for f/u DM, get A1c.    Please note that this dictation was created using voice recognition software. I have made every reasonable attempt to correct obvious errors, but I expect that there are errors of grammar and possibly content that I did not discover before finalizing the note.

## 2021-10-25 NOTE — ASSESSMENT & PLAN NOTE
This is a chronic condition, uncontrolled.  For a year she has had urinary urgency.  She does have a history of a bladder prolapse that was repaired when she had a hysterectomy but it returned within a couple of years.  Over the last few months been especially worse and she is having incontinence when she does not get to the bathroom quickly enough.  She does have a history of recurrent UTIs we checked a UA in the office to be sure there is no UTI.  No obvious UTI was noted so we will start her on oxybutynin for urge incontinence.  -Oxybutynin ER 5 mg daily

## 2021-10-25 NOTE — ASSESSMENT & PLAN NOTE
This is a chronic condition, uncontrolled.  Hemoglobin A1c is above 7% though slightly improved compared to 6 months ago.  After discussion regarding add a second agent, and we will add a GLP-1 receptor agonist.  She is on an ACE inhibitor and statin appropriately.  -Continue Metformin ER 1000 mg twice daily  -Start Trulicity 0.75 mg every 7 days

## 2021-10-25 NOTE — ASSESSMENT & PLAN NOTE
This is a chronic condition, controlled.  Blood pressure is at goal under 140/90 in the office today.  We will continue the current regimen.  -Lisinopril 10 mg daily

## 2021-11-02 RX ORDER — LISINOPRIL 10 MG/1
TABLET ORAL
Qty: 90 TABLET | Refills: 3 | Status: SHIPPED | OUTPATIENT
Start: 2021-11-02 | End: 2022-11-21

## 2021-11-22 RX ORDER — DULAGLUTIDE 0.75 MG/.5ML
0.5 INJECTION, SOLUTION SUBCUTANEOUS
Qty: 6 ML | Refills: 0 | Status: SHIPPED | OUTPATIENT
Start: 2021-11-22 | End: 2021-12-27

## 2021-12-23 RX ORDER — OXYBUTYNIN CHLORIDE 5 MG/1
TABLET, EXTENDED RELEASE ORAL
Qty: 30 TABLET | Refills: 0 | Status: SHIPPED | OUTPATIENT
Start: 2021-12-23 | End: 2022-01-17

## 2021-12-27 RX ORDER — DULAGLUTIDE 0.75 MG/.5ML
INJECTION, SOLUTION SUBCUTANEOUS
Qty: 6 ML | Refills: 0 | Status: SHIPPED | OUTPATIENT
Start: 2021-12-27 | End: 2022-01-24

## 2021-12-27 RX ORDER — DULAGLUTIDE 0.75 MG/.5ML
0.5 INJECTION, SOLUTION SUBCUTANEOUS
Qty: 6 ML | Refills: 0 | OUTPATIENT
Start: 2021-12-27

## 2022-01-17 RX ORDER — OXYBUTYNIN CHLORIDE 5 MG/1
TABLET, EXTENDED RELEASE ORAL
Qty: 30 TABLET | Refills: 0 | Status: SHIPPED | OUTPATIENT
Start: 2022-01-17 | End: 2022-01-27 | Stop reason: SDUPTHER

## 2022-01-24 ENCOUNTER — HOSPITAL ENCOUNTER (OUTPATIENT)
Dept: RADIOLOGY | Facility: MEDICAL CENTER | Age: 67
End: 2022-01-24
Attending: FAMILY MEDICINE
Payer: COMMERCIAL

## 2022-01-24 DIAGNOSIS — Z12.31 VISIT FOR SCREENING MAMMOGRAM: ICD-10-CM

## 2022-01-24 PROCEDURE — 77063 BREAST TOMOSYNTHESIS BI: CPT

## 2022-01-24 RX ORDER — DULAGLUTIDE 0.75 MG/.5ML
INJECTION, SOLUTION SUBCUTANEOUS
Qty: 6 ML | Refills: 0 | Status: SHIPPED | OUTPATIENT
Start: 2022-01-24 | End: 2022-04-11

## 2022-01-27 ENCOUNTER — OFFICE VISIT (OUTPATIENT)
Dept: MEDICAL GROUP | Facility: PHYSICIAN GROUP | Age: 67
End: 2022-01-27
Payer: COMMERCIAL

## 2022-01-27 VITALS
HEART RATE: 88 BPM | RESPIRATION RATE: 16 BRPM | DIASTOLIC BLOOD PRESSURE: 66 MMHG | BODY MASS INDEX: 29.15 KG/M2 | TEMPERATURE: 98 F | OXYGEN SATURATION: 96 % | SYSTOLIC BLOOD PRESSURE: 118 MMHG | HEIGHT: 66 IN | WEIGHT: 181.4 LBS

## 2022-01-27 DIAGNOSIS — E11.9 TYPE 2 DIABETES MELLITUS WITHOUT COMPLICATION, WITHOUT LONG-TERM CURRENT USE OF INSULIN (HCC): Primary | ICD-10-CM

## 2022-01-27 DIAGNOSIS — Z78.0 POSTMENOPAUSAL: ICD-10-CM

## 2022-01-27 DIAGNOSIS — R39.15 URINARY URGENCY: ICD-10-CM

## 2022-01-27 DIAGNOSIS — M85.80 OSTEOPENIA, UNSPECIFIED LOCATION: ICD-10-CM

## 2022-01-27 LAB
HBA1C MFR BLD: 7 % (ref 0–5.6)
INT CON NEG: ABNORMAL
INT CON POS: ABNORMAL

## 2022-01-27 PROCEDURE — 83036 HEMOGLOBIN GLYCOSYLATED A1C: CPT | Performed by: FAMILY MEDICINE

## 2022-01-27 PROCEDURE — 99214 OFFICE O/P EST MOD 30 MIN: CPT | Performed by: FAMILY MEDICINE

## 2022-01-27 RX ORDER — OXYBUTYNIN CHLORIDE 5 MG/1
5 TABLET, EXTENDED RELEASE ORAL DAILY
Qty: 90 TABLET | Refills: 3 | Status: SHIPPED | OUTPATIENT
Start: 2022-01-27 | End: 2023-04-24

## 2022-01-27 RX ORDER — FLUTICASONE PROPIONATE 50 MCG
1 SPRAY, SUSPENSION (ML) NASAL DAILY
Qty: 16 G | Refills: 5 | Status: SHIPPED | OUTPATIENT
Start: 2022-01-27 | End: 2023-10-23 | Stop reason: SDUPTHER

## 2022-01-27 ASSESSMENT — ENCOUNTER SYMPTOMS
CHILLS: 0
SHORTNESS OF BREATH: 0
FEVER: 0

## 2022-01-27 ASSESSMENT — FIBROSIS 4 INDEX: FIB4 SCORE: 1.14

## 2022-01-27 ASSESSMENT — PATIENT HEALTH QUESTIONNAIRE - PHQ9: CLINICAL INTERPRETATION OF PHQ2 SCORE: 0

## 2022-01-27 NOTE — ASSESSMENT & PLAN NOTE
Chronic, improved.  She reports more than 50% improvement in her urinary urgency symptoms since starting the oxybutynin.  She is noticing some dry skin, so after discussion today she is decided she like to stick with this current dose.  -Oxybutynin ER 5 mg daily

## 2022-01-27 NOTE — PROGRESS NOTES
"Subjective:     CC: f/u DM, bladder    HPI:   Christa presents today with    Problem   Urinary Urgency    She has been getting increased urinary urgency for years. She is getting incontinence if she doesn't move fast enough. She has had bladder prolapse for years. Tried to have it fixed during a hysterectomy but symptoms recurred within a couple of years. At her last appointment we started oxybutynin. She reports it is working fairly well. She has >50% improvement.      Osteopenia    2017 DEXA: LS T -2.3, L femur T -1.3, started on raloxifene  2016: prolia - too expensive  1/2015-4/2016: alendronate  2015 DEXA: LS T -2.4, L femur T -1.3  2012 DEXA: LS T -2.3, L femur T -1.2  2010 DEXA: LS T -2.4, L femur T -1.0  12/2009-1/2015: calcitonin  2009: actonel - allergic  2008 DEXA: LS T -2.7, L femur T +0.9     Type 2 Diabetes Mellitus Without Complication (Hcc)    This is a chronic condition.  Current medications:  Insulin: -  Biguanide: metformin ER 1000 mg bid  GLP1-RA: trulicity 0.75 mg every 7 days  SGLT-2i: -  DPP4-I: -  TZD: -  Emperatriz: -  Sulfonyluria: -    Last A1c: 7.0% (1/2022); 7.8% (10/2021); 8.0% (4/2021)  Last Microalb/Cr ratio: normal (7/2021)  Fasting sugars: n/a  Last diabetic foot exam: 10/2021  Last retinal eye exam: due  ACEi/ARB: lisinopril 10 mg  Statin: rosuvastatin 40 mg  Aspirin: yes  Concomitant HTN: yes - at goal  Nightly foot checks: yes           Health Maintenance: Completed    ROS:  Review of Systems   Constitutional: Negative for chills and fever.   Respiratory: Negative for shortness of breath.    Cardiovascular: Negative for chest pain.     Objective:     Exam:  /66 (BP Location: Left arm, Patient Position: Sitting, BP Cuff Size: Adult)   Pulse 88   Temp 36.7 °C (98 °F) (Temporal)   Resp 16   Ht 1.676 m (5' 6\")   Wt 82.3 kg (181 lb 6.4 oz)   SpO2 96%   BMI 29.28 kg/m²  Body mass index is 29.28 kg/m².    Physical Exam  Constitutional:       Appearance: Normal appearance. "   Cardiovascular:      Rate and Rhythm: Normal rate and regular rhythm.      Heart sounds: Normal heart sounds.   Pulmonary:      Effort: Pulmonary effort is normal.      Breath sounds: Normal breath sounds.   Musculoskeletal:      Cervical back: Normal range of motion and neck supple.   Neurological:      Mental Status: She is alert.       Assessment & Plan:     66 y.o. female with the following -     Problem List Items Addressed This Visit     Type 2 diabetes mellitus without complication (HCC)     Chronic, improved.  Hemoglobin A1c is 7.0%.  She is up-to-date with all of her diabetic screenings, on an ACE inhibitor for renal protection, and is on a statin for cardiovascular protection.   -Continue Trulicity 0.75 mg every 7 days until this prescription runs out, at which point we will increase her to 1 mg every 7 days  -Continue Metformin ER 1000 mg twice daily         Relevant Orders    POCT Hemoglobin A1C (Completed)    Osteopenia     Chronic, stable.  She has been on multiple medications to treat her osteoporosis including Actonel which she was allergic to, calcitonin for 6 years, and alendronate for 1 year.  We tried transitioning to Prolia but it was too expensive and not covered.  She is been on raloxifene since 2017 and is time to check the DEXA to see if the raloxifene is working.         Relevant Orders    DS-BONE DENSITY STUDY (DEXA)    Urinary urgency     Chronic, improved.  She reports more than 50% improvement in her urinary urgency symptoms since starting the oxybutynin.  She is noticing some dry skin, so after discussion today she is decided she like to stick with this current dose.  -Oxybutynin ER 5 mg daily           Other Visit Diagnoses     Postmenopausal        Relevant Orders    DS-BONE DENSITY STUDY (DEXA)        Return in about 6 months (around 7/27/2022) for Annual/wellness visit, f/u DM, get A1c.    Please note that this dictation was created using voice recognition software. I have made  every reasonable attempt to correct obvious errors, but I expect that there are errors of grammar and possibly content that I did not discover before finalizing the note.

## 2022-01-27 NOTE — LETTER
[facility]  Arlin Cota M.D.  [address]  [place] [address]  Fax: [phone]   Authorization for Release/Disclosure of   Protected Health Information   Name: RADHA MCQUEEN : 1955 SSN: [id number]   Address: [facility] [address]   [place] [address] Phone:    [phone] (home)    I authorize the entity listed below to release/disclose the PHI below to:   [facility]/Arlin Cota M.D. and Arlin Cota M.D.   Provider or Entity Name:  [facility]   Address   City, State, Zip   Phone:      Fax:     Reason for request: continuity of care   Information to be released:    [  ] LAST COLONOSCOPY,  including any PATH REPORT and follow-up  [  ] LAST FIT/COLOGUARD RESULT [  ] LAST DEXA  [  ] LAST MAMMOGRAM  [  ] LAST PAP  [  ] LAST LABS [XX] RETINA EXAM REPORT  [  ] IMMUNIZATION RECORDS  [  ] Release all info      [  ] Check here and initial the line next to each item to release ALL health information INCLUDING  _____ Care and treatment for drug and / or alcohol abuse  _____ HIV testing, infection status, or AIDS  _____ Genetic Testing    DATES OF SERVICE OR TIME PERIOD TO BE DISCLOSED: _____________  I understand and acknowledge that:  * This Authorization may be revoked at any time by you in writing, except if your health information has already been used or disclosed.  * Your health information that will be used or disclosed as a result of you signing this authorization could be re-disclosed by the recipient. If this occurs, your re-disclosed health information may no longer be protected by State or Federal laws.  * You may refuse to sign this Authorization. Your refusal will not affect your ability to obtain treatment.  * This Authorization becomes effective upon signing and will  on (date) __________.      If no date is indicated, this Authorization will  one (1) year from the signature date.    Name: Radha Mcqueen    Signature:   Date:      1/27/2022       PLEASE FAX REQUESTED RECORDS BACK TO: [phone]

## 2022-01-27 NOTE — ASSESSMENT & PLAN NOTE
Chronic, improved.  Hemoglobin A1c is 7.0%.  She is up-to-date with all of her diabetic screenings, on an ACE inhibitor for renal protection, and is on a statin for cardiovascular protection.   -Continue Trulicity 0.75 mg every 7 days until this prescription runs out, at which point we will increase her to 1 mg every 7 days  -Continue Metformin ER 1000 mg twice daily

## 2022-01-28 NOTE — ASSESSMENT & PLAN NOTE
Chronic, stable.  She has been on multiple medications to treat her osteoporosis including Actonel which she was allergic to, calcitonin for 6 years, and alendronate for 1 year.  We tried transitioning to Prolia but it was too expensive and not covered.  She is been on raloxifene since 2017 and is time to check the DEXA to see if the raloxifene is working.

## 2022-02-07 ENCOUNTER — HOSPITAL ENCOUNTER (OUTPATIENT)
Dept: RADIOLOGY | Facility: MEDICAL CENTER | Age: 67
End: 2022-02-07
Attending: FAMILY MEDICINE
Payer: COMMERCIAL

## 2022-02-07 DIAGNOSIS — Z78.0 POSTMENOPAUSAL: ICD-10-CM

## 2022-02-07 DIAGNOSIS — M85.80 OSTEOPENIA, UNSPECIFIED LOCATION: ICD-10-CM

## 2022-02-07 PROCEDURE — 77080 DXA BONE DENSITY AXIAL: CPT

## 2022-02-07 RX ORDER — DIPHENHYDRAMINE HYDROCHLORIDE 50 MG/ML
50 INJECTION INTRAMUSCULAR; INTRAVENOUS PRN
OUTPATIENT
Start: 2022-02-07

## 2022-02-07 RX ORDER — METHYLPREDNISOLONE SODIUM SUCCINATE 125 MG/2ML
125 INJECTION, POWDER, LYOPHILIZED, FOR SOLUTION INTRAMUSCULAR; INTRAVENOUS PRN
OUTPATIENT
Start: 2022-02-07

## 2022-02-07 RX ORDER — EPINEPHRINE 1 MG/ML(1)
0.5 AMPUL (ML) INJECTION PRN
OUTPATIENT
Start: 2022-02-07

## 2022-02-08 NOTE — PROGRESS NOTES
She has been on the following medications previously with either no success in improving her bone density or side effects:  -Actonel  -Calcitonin  -Alendronate    She is currently on raloxifene and her most recent DEXA shows a continued worsening of bone density despite being on raloxifene.    Therefore, we are going to transition her to Prolia.

## 2022-02-25 ENCOUNTER — TELEPHONE (OUTPATIENT)
Dept: MEDICAL GROUP | Facility: PHYSICIAN GROUP | Age: 67
End: 2022-02-25
Payer: COMMERCIAL

## 2022-02-25 NOTE — TELEPHONE ENCOUNTER
Phone Number Called: [phone]    Call outcome: Spoke to [place]    Message: patient approved to go to [facility] for prolia inj.  Will have to resend orders there    [facility] [address] [place] [address]  [phone]

## 2022-02-25 NOTE — TELEPHONE ENCOUNTER
VOICEMAIL  1. Caller Name: Brittany Joe                       Call Back Number: [phone]    2. Message: patient not approved to have prolia injection at [facility] outpatient setting. Has list of approved facilities

## 2022-02-25 NOTE — TELEPHONE ENCOUNTER
Phone Number Called: [phone] (home)     Call outcome: Did not leave a detailed message. Requested patient to call back.    Message: cb to see if patient wants to go to [facility] for services

## 2022-03-03 ENCOUNTER — TELEPHONE (OUTPATIENT)
Dept: MEDICAL GROUP | Facility: PHYSICIAN GROUP | Age: 67
End: 2022-03-03
Payer: COMMERCIAL

## 2022-03-03 NOTE — TELEPHONE ENCOUNTER
FINAL PRIOR AUTHORIZATION STATUS:    1.  Name of Medication & Dose: PROLIA 60MG/ML SYRINGE      2. Prior Auth Status: Approved through [employer]      3. Action Taken: Pharmacy Notified: N\A Patient Notified: N\A

## 2022-03-08 ENCOUNTER — TELEPHONE (OUTPATIENT)
Dept: MEDICAL GROUP | Facility: PHYSICIAN GROUP | Age: 67
End: 2022-03-08
Payer: COMMERCIAL

## 2022-03-08 DIAGNOSIS — M81.0 AGE-RELATED OSTEOPOROSIS WITHOUT CURRENT PATHOLOGICAL FRACTURE: ICD-10-CM

## 2022-03-08 NOTE — TELEPHONE ENCOUNTER
Informed needs to be done at a [facility] lab.    Labs are ordered. Please inform patient. Non-fasting.

## 2022-03-08 NOTE — TELEPHONE ENCOUNTER
Will they take a verbal order to check there? Or do I need to order it and send her to the lab for a Ca check?

## 2022-03-08 NOTE — TELEPHONE ENCOUNTER
Phone Number Called: [phone] (home)     Call outcome: Spoke to patient     Message: labs ordered

## 2022-03-08 NOTE — TELEPHONE ENCOUNTER
[facility] called requesting a calcium lab for patient before appointment for Prolia injection next week.    Please advise

## 2022-03-18 ENCOUNTER — HOSPITAL ENCOUNTER (OUTPATIENT)
Dept: LAB | Facility: MEDICAL CENTER | Age: 67
End: 2022-03-18
Attending: FAMILY MEDICINE
Payer: COMMERCIAL

## 2022-03-18 DIAGNOSIS — M81.0 AGE-RELATED OSTEOPOROSIS WITHOUT CURRENT PATHOLOGICAL FRACTURE: ICD-10-CM

## 2022-03-18 LAB
CALCIUM SERPL-MCNC: 9.8 MG/DL (ref 8.5–10.5)
CREAT SERPL-MCNC: 0.79 MG/DL (ref 0.5–1.4)
GFR SERPLBLD CREATININE-BSD FMLA CKD-EPI: 83 ML/MIN/1.73 M 2

## 2022-03-18 PROCEDURE — 82565 ASSAY OF CREATININE: CPT

## 2022-03-18 PROCEDURE — 36415 COLL VENOUS BLD VENIPUNCTURE: CPT

## 2022-03-18 PROCEDURE — 82310 ASSAY OF CALCIUM: CPT

## 2022-04-11 RX ORDER — DULAGLUTIDE 0.75 MG/.5ML
INJECTION, SOLUTION SUBCUTANEOUS
Qty: 6 ML | Refills: 0 | Status: SHIPPED | OUTPATIENT
Start: 2022-04-11 | End: 2022-07-05

## 2022-07-05 RX ORDER — DULAGLUTIDE 0.75 MG/.5ML
INJECTION, SOLUTION SUBCUTANEOUS
Qty: 6 ML | Refills: 0 | Status: SHIPPED | OUTPATIENT
Start: 2022-07-05 | End: 2022-08-04

## 2022-07-16 DIAGNOSIS — E03.1 CONGENITAL HYPOTHYROIDISM: ICD-10-CM

## 2022-07-18 RX ORDER — LEVOTHYROXINE SODIUM 112 UG/1
TABLET ORAL
Qty: 90 TABLET | Refills: 0 | Status: SHIPPED | OUTPATIENT
Start: 2022-07-18 | End: 2023-03-31

## 2022-07-26 NOTE — TELEPHONE ENCOUNTER
Was the patient seen in the last year in this department? Yes     Does patient have an active prescription for medications requested? No     Received Request Via: Patient   no

## 2022-08-04 ENCOUNTER — HOSPITAL ENCOUNTER (OUTPATIENT)
Facility: MEDICAL CENTER | Age: 67
End: 2022-08-04
Attending: FAMILY MEDICINE
Payer: COMMERCIAL

## 2022-08-04 ENCOUNTER — OFFICE VISIT (OUTPATIENT)
Dept: MEDICAL GROUP | Facility: PHYSICIAN GROUP | Age: 67
End: 2022-08-04
Payer: COMMERCIAL

## 2022-08-04 VITALS
DIASTOLIC BLOOD PRESSURE: 76 MMHG | TEMPERATURE: 97.4 F | OXYGEN SATURATION: 95 % | WEIGHT: 180.4 LBS | SYSTOLIC BLOOD PRESSURE: 124 MMHG | BODY MASS INDEX: 28.99 KG/M2 | HEIGHT: 66 IN | HEART RATE: 86 BPM

## 2022-08-04 DIAGNOSIS — E11.9 TYPE 2 DIABETES MELLITUS WITHOUT COMPLICATION, WITHOUT LONG-TERM CURRENT USE OF INSULIN (HCC): ICD-10-CM

## 2022-08-04 DIAGNOSIS — R05.9 COUGH: ICD-10-CM

## 2022-08-04 LAB
HBA1C MFR BLD: 7 % (ref 0–5.6)
INT CON NEG: ABNORMAL
INT CON POS: ABNORMAL

## 2022-08-04 PROCEDURE — 83036 HEMOGLOBIN GLYCOSYLATED A1C: CPT | Performed by: FAMILY MEDICINE

## 2022-08-04 PROCEDURE — 82043 UR ALBUMIN QUANTITATIVE: CPT

## 2022-08-04 PROCEDURE — 82570 ASSAY OF URINE CREATININE: CPT

## 2022-08-04 PROCEDURE — 99214 OFFICE O/P EST MOD 30 MIN: CPT | Performed by: FAMILY MEDICINE

## 2022-08-04 RX ORDER — BENZONATATE 100 MG/1
100-200 CAPSULE ORAL 3 TIMES DAILY PRN
Qty: 160 CAPSULE | Refills: 0 | Status: SHIPPED | OUTPATIENT
Start: 2022-08-04 | End: 2023-10-20

## 2022-08-04 ASSESSMENT — FIBROSIS 4 INDEX: FIB4 SCORE: 1.16

## 2022-08-04 NOTE — PROGRESS NOTES
"Subjective:     CC: diabetes, cough    HPI:   Christa presents today with    Problem   Cough    3 weeks  Nighttime coughing  Only starts when lays down  Multiple home COVID tests negative   has similar issue  No fevers/chills, no shortness of breath  Initially rhinorrhea, sore throat     Type 2 Diabetes Mellitus Without Complication (Hcc)    This is a chronic condition.  Current medications:  Insulin: -  Biguanide: metformin ER 1000 mg bid  GLP1-RA: trulicity 0.75 mg every 7 days  SGLT-2i: -  DPP4-I: -  TZD: -  Emperatriz: -  Sulfonyluria: -    Last A1c: 7.0% (8/2022); 7.0% (1/2022); 7.8% (10/2021); 8.0% (4/2021)  Last Microalb/Cr ratio: normal (7/2021)  Fasting sugars: n/a  Last diabetic foot exam: 10/2021  Last retinal eye exam: 3/2022 - no DM retinopathy  ACEi/ARB: lisinopril 10 mg  Statin: rosuvastatin 40 mg  Aspirin: yes  Concomitant HTN: yes - at goal  Nightly foot checks: yes           Health Maintenance: Completed    ROS:  See HPI    Objective:     Exam:  /76 (BP Location: Right arm, Patient Position: Sitting, BP Cuff Size: Adult)   Pulse 86   Temp 36.3 °C (97.4 °F) (Temporal)   Ht 1.676 m (5' 6\")   Wt 81.8 kg (180 lb 6.4 oz)   SpO2 95%   BMI 29.12 kg/m²  Body mass index is 29.12 kg/m².    Physical Exam  Constitutional:       Appearance: Normal appearance.   Cardiovascular:      Rate and Rhythm: Normal rate and regular rhythm.      Heart sounds: Normal heart sounds.   Pulmonary:      Effort: Pulmonary effort is normal.      Breath sounds: Normal breath sounds.      Comments: Deep breaths induced coughing  Musculoskeletal:      Cervical back: Normal range of motion and neck supple.   Neurological:      Mental Status: She is alert.       Assessment & Plan:     67 y.o. female with the following -     1. Type 2 diabetes mellitus without complication, without long-term current use of insulin (HCC)  Chronic, uncontrolled.  Hemoglobin A1c is 7.0%.  She is up-to-date with all of her diabetic screenings, " on an ACE inhibitor for renal protection, and is on a statin for cardiovascular protection.  We will increase the current regimen to push the A1c under 7.0%  -Continue metformin 1000 g twice daily  -Increase Trulicity to 1.5 mg every 7 days  - POCT Hemoglobin A1C  - Lipid Profile; Future  - MICROALBUMIN CREAT RATIO URINE; Future    2. Cough  Acute.  For 3-week she has been struggling with a significant cough that affects her mostly at night.  Her  is also having the same issue.  Earlier she did have associated sore throat and other symptoms I suspect this was a viral illness and she now has postviral cough.  She reports that she and her  both tested negative for COVID-19 multiple times at home testing.  We discussed the etiology of postviral cough and the projected course.  I have provided benzonatate Perles to help manage the cough so she can sleep better.    Return in about 4 months (around 12/4/2022) for f/u DM, get A1c.    Please note that this dictation was created using voice recognition software. I have made every reasonable attempt to correct obvious errors, but I expect that there are errors of grammar and possibly content that I did not discover before finalizing the note.

## 2022-08-05 LAB
CREAT UR-MCNC: 178.35 MG/DL
MICROALBUMIN UR-MCNC: 1.2 MG/DL
MICROALBUMIN/CREAT UR: 7 MG/G (ref 0–30)

## 2022-08-19 ENCOUNTER — HOSPITAL ENCOUNTER (OUTPATIENT)
Dept: LAB | Facility: MEDICAL CENTER | Age: 67
End: 2022-08-19
Attending: FAMILY MEDICINE
Payer: COMMERCIAL

## 2022-08-19 DIAGNOSIS — E11.9 TYPE 2 DIABETES MELLITUS WITHOUT COMPLICATION, WITHOUT LONG-TERM CURRENT USE OF INSULIN (HCC): ICD-10-CM

## 2022-08-19 LAB
CHOLEST SERPL-MCNC: 154 MG/DL (ref 100–199)
HDLC SERPL-MCNC: 53 MG/DL
LDLC SERPL CALC-MCNC: 81 MG/DL
TRIGL SERPL-MCNC: 100 MG/DL (ref 0–149)

## 2022-08-19 PROCEDURE — 36415 COLL VENOUS BLD VENIPUNCTURE: CPT

## 2022-08-19 PROCEDURE — 80061 LIPID PANEL: CPT

## 2022-08-26 ENCOUNTER — TELEPHONE (OUTPATIENT)
Dept: ONCOLOGY | Facility: MEDICAL CENTER | Age: 67
End: 2022-08-26
Payer: COMMERCIAL

## 2022-08-26 NOTE — TELEPHONE ENCOUNTER
[facility] to schedule Prolia, denial was overturned, provided call back #[phone] to schedule Prolia treatment.

## 2022-09-08 ENCOUNTER — HOSPITAL ENCOUNTER (OUTPATIENT)
Dept: RADIOLOGY | Facility: MEDICAL CENTER | Age: 67
End: 2022-09-08
Attending: FAMILY MEDICINE
Payer: COMMERCIAL

## 2022-09-08 DIAGNOSIS — E06.3 HYPOTHYROIDISM DUE TO HASHIMOTO'S THYROIDITIS: ICD-10-CM

## 2022-09-08 DIAGNOSIS — E03.8 HYPOTHYROIDISM DUE TO HASHIMOTO'S THYROIDITIS: ICD-10-CM

## 2022-09-08 DIAGNOSIS — Z86.39 HISTORY OF THYROID NODULE: ICD-10-CM

## 2022-09-08 PROCEDURE — 76536 US EXAM OF HEAD AND NECK: CPT

## 2022-09-20 ENCOUNTER — HOSPITAL ENCOUNTER (OUTPATIENT)
Dept: RADIOLOGY | Facility: MEDICAL CENTER | Age: 67
End: 2022-09-20
Attending: FAMILY MEDICINE
Payer: COMMERCIAL

## 2022-09-20 DIAGNOSIS — E04.1 THYROID NODULE: ICD-10-CM

## 2022-09-20 PROCEDURE — 88173 CYTOPATH EVAL FNA REPORT: CPT

## 2022-09-20 PROCEDURE — 10005 FNA BX W/US GDN 1ST LES: CPT

## 2022-09-20 RX ORDER — LIDOCAINE HYDROCHLORIDE 20 MG/ML
INJECTION, SOLUTION EPIDURAL; INFILTRATION; INTRACAUDAL; PERINEURAL
Status: DISCONTINUED
Start: 2022-09-20 | End: 2022-09-21 | Stop reason: HOSPADM

## 2022-09-20 NOTE — PROGRESS NOTES
Pt presents to opir. Pt was consented by MD at bedside, confirmed by this RN and consent at bedside. Pt transferred to stretcher  table in supine position. Patient underwent a thyroid fna by Dr. Huber. Procedure site was marked by MD and verified using imaging guidance. Pt placed on monitor, prepped and draped in a sterile fashion. Vitals were taken every 5 minutes and remained stable during procedure (see doc flow sheet for results). CO2 waveform capnography was monitored and remained WNL throughout procedure. [facility]  hand delivered to lab.

## 2022-09-26 ENCOUNTER — HOSPITAL ENCOUNTER (OUTPATIENT)
Dept: LAB | Facility: MEDICAL CENTER | Age: 67
End: 2022-09-26
Attending: FAMILY MEDICINE
Payer: COMMERCIAL

## 2022-09-26 DIAGNOSIS — M81.0 AGE-RELATED OSTEOPOROSIS WITHOUT CURRENT PATHOLOGICAL FRACTURE: ICD-10-CM

## 2022-09-26 LAB — CALCIUM SERPL-MCNC: 9.2 MG/DL (ref 8.5–10.5)

## 2022-09-26 PROCEDURE — 36415 COLL VENOUS BLD VENIPUNCTURE: CPT

## 2022-09-26 PROCEDURE — 82310 ASSAY OF CALCIUM: CPT

## 2022-09-28 ENCOUNTER — OFFICE VISIT (OUTPATIENT)
Dept: MEDICAL GROUP | Facility: PHYSICIAN GROUP | Age: 67
End: 2022-09-28
Payer: COMMERCIAL

## 2022-09-28 ENCOUNTER — HOSPITAL ENCOUNTER (OUTPATIENT)
Facility: MEDICAL CENTER | Age: 67
End: 2022-09-28
Attending: FAMILY MEDICINE
Payer: COMMERCIAL

## 2022-09-28 VITALS
TEMPERATURE: 97.3 F | SYSTOLIC BLOOD PRESSURE: 160 MMHG | HEIGHT: 66 IN | HEART RATE: 92 BPM | OXYGEN SATURATION: 94 % | BODY MASS INDEX: 28.28 KG/M2 | DIASTOLIC BLOOD PRESSURE: 86 MMHG | WEIGHT: 176 LBS

## 2022-09-28 DIAGNOSIS — N81.4 CYSTOCELE WITH PROLAPSE: ICD-10-CM

## 2022-09-28 DIAGNOSIS — R39.15 URINARY URGENCY: ICD-10-CM

## 2022-09-28 LAB
APPEARANCE UR: NORMAL
BILIRUB UR STRIP-MCNC: NEGATIVE MG/DL
COLOR UR AUTO: YELLOW
GLUCOSE UR STRIP.AUTO-MCNC: NEGATIVE MG/DL
KETONES UR STRIP.AUTO-MCNC: NEGATIVE MG/DL
LEUKOCYTE ESTERASE UR QL STRIP.AUTO: NORMAL
NITRITE UR QL STRIP.AUTO: POSITIVE
PH UR STRIP.AUTO: 5.5 [PH] (ref 5–8)
PROT UR QL STRIP: 30 MG/DL
RBC UR QL AUTO: NORMAL
SP GR UR STRIP.AUTO: 1.02
UROBILINOGEN UR STRIP-MCNC: 0.2 MG/DL

## 2022-09-28 PROCEDURE — 87186 SC STD MICRODIL/AGAR DIL: CPT

## 2022-09-28 PROCEDURE — 87077 CULTURE AEROBIC IDENTIFY: CPT

## 2022-09-28 PROCEDURE — 99213 OFFICE O/P EST LOW 20 MIN: CPT | Performed by: FAMILY MEDICINE

## 2022-09-28 PROCEDURE — 81002 URINALYSIS NONAUTO W/O SCOPE: CPT | Performed by: FAMILY MEDICINE

## 2022-09-28 PROCEDURE — 87086 URINE CULTURE/COLONY COUNT: CPT

## 2022-09-28 ASSESSMENT — FIBROSIS 4 INDEX: FIB4 SCORE: 1.16

## 2022-09-28 NOTE — PROGRESS NOTES
"Chief Complaint   Patient presents with    Bladder Problem     Prolapsed bladder    UTI       HPI:  Symptom onset: 20 years of prolapse.  Current symptoms: No painful, +urgent, +frequent voids. No blood noted in urine.  Since onset symptoms are: Unchanged  Treatments tried: OTC antispasm med.  Associated symptoms: Negative for fever, flank pain, nausea and vomiting, vaginal discharge, pelvic pain.  History is positive for frequent UTI.     20 years ago had a hysterectomy and tied up the bladder. 2 yrs later, the bladder prolapsed. She will urinate with elbows leaning on knees but sometimes still needs to manually lift the prolapse to void.     ROS:  Denies fever, chills, vomiting or abdominal pain.     OBJECTIVE:  BP (!) 160/86 (BP Location: Right arm, Patient Position: Sitting, BP Cuff Size: Adult)   Pulse 92   Temp 36.3 °C (97.3 °F) (Temporal)   Ht 1.676 m (5' 6\")   Wt 79.8 kg (176 lb)   SpO2 94%   Gen: Alert, NAD.  Chest: Lungs clear to auscultation, CV RRR.  Abdomen: Soft, tender in suprapubic region. No CVAT. Normal bowel sounds.     Lab Results   Component Value Date    POCCOLOR Yellow 09/28/2022    POCAPPEAR Cloudy 09/28/2022    POCLEUKEST Small 09/28/2022    POCNITRITE Positive 09/28/2022    POCUROBILIGE 0.2 09/28/2022    POCPROTEIN 30 09/28/2022    POCURPH 5.5 09/28/2022    POCBLOOD Trace-Intact 09/28/2022    POCSPGRV 1.025 09/28/2022    POCKETONES Negative 09/28/2022    POCBILIRUBIN Negative 09/28/2022    POCGLUCUA Negative 09/28/2022          ASSESSMENT/PLAN:     1. Cystocele with prolapse    2. Urinary urgency          1. Abnormal urine dipstick in office. Urine sent for culture. Antibiotics to be determined based on culture results.  2. Provided education to drink plenty of fluids, wipe front to back every void and bowel movement.   3. Return to clinic if symptoms not improving within 3-4 days or in case of vomiting, fever, increasing pain.    "

## 2022-09-29 DIAGNOSIS — R39.15 URINARY URGENCY: ICD-10-CM

## 2022-10-01 LAB
BACTERIA UR CULT: ABNORMAL
BACTERIA UR CULT: ABNORMAL
SIGNIFICANT IND 70042: ABNORMAL
SITE SITE: ABNORMAL
SOURCE SOURCE: ABNORMAL

## 2022-10-25 LAB — CYTOLOGY REG CYTOL: NORMAL

## 2022-12-16 ENCOUNTER — GYNECOLOGY VISIT (OUTPATIENT)
Dept: OBGYN | Facility: CLINIC | Age: 67
End: 2022-12-16
Payer: COMMERCIAL

## 2022-12-16 VITALS — BODY MASS INDEX: 27.64 KG/M2 | HEIGHT: 66 IN | WEIGHT: 172 LBS

## 2022-12-16 DIAGNOSIS — N39.46 URINARY INCONTINENCE, MIXED: ICD-10-CM

## 2022-12-16 DIAGNOSIS — N39.0 RECURRENT UTI: ICD-10-CM

## 2022-12-16 DIAGNOSIS — N95.2 ATROPHIC VAGINITIS: ICD-10-CM

## 2022-12-16 DIAGNOSIS — N81.12 CYSTOCELE, LATERAL: ICD-10-CM

## 2022-12-16 DIAGNOSIS — N81.9 VAGINAL VAULT PROLAPSE: ICD-10-CM

## 2022-12-16 DIAGNOSIS — N81.6 RECTOCELE: ICD-10-CM

## 2022-12-16 PROCEDURE — 99204 OFFICE O/P NEW MOD 45 MIN: CPT | Performed by: STUDENT IN AN ORGANIZED HEALTH CARE EDUCATION/TRAINING PROGRAM

## 2022-12-16 RX ORDER — ESTRADIOL 0.1 MG/G
CREAM VAGINAL
Qty: 1 EACH | Refills: 3 | Status: SHIPPED | OUTPATIENT
Start: 2022-12-16

## 2022-12-16 ASSESSMENT — FIBROSIS 4 INDEX: FIB4 SCORE: 1.16

## 2022-12-16 NOTE — PROGRESS NOTES
Urogynecology and Pelvic Reconstructive Surgery Consultation Visit    Christa Ramirez MRN:[id number] :1955    Referred by: Dr Cota    Reason for Visit:   Chief Complaint   Patient presents with    New Patient     Consult          Subjective     History of Presenting Illness:    Ms.Melody Khloe Ramirez is a 67 y.o. year old P4 who was referred by Dr. Cota for the evaluation and management of recurrent UTI and prolapse.     She is very bothered by recurrent UTIs. She has had 2 in th last month. She used macrobid for most her UTI treatments which worked well, but this was stopped by her MD due to age.  She feels that other antibiotics have not worked as well    UTI symptoms are anxiety, burning/dysuria, urgency/frequency.      She has not tried vaginal estrogen as a preventative previously.    She also has bulge symptoms and prolapse that comes to the outside of the vaginal introitus, minimal to moderately bothersome, although she does have to push it back into use the toilet on occasion.  This does not interfere with intercourse and she denies pain.    Prior Pelvic surgery:    TVH, ovaries retained, prolapse repair without mesh       Prior treatment:   Kegels  Antibiotics      Urine cultures:   10/1/22: >100k K pneumo, (R) Bactrim    Pelvic floor symptom review:     Bladder:   Voids per day: 3-4 Voids per night: 1      Urinary incontinence episodes per day: 1    Urge leakage:  On Movement to Bathroom   Stress leakage: With Cough and With Exercise   Continuous / insensible urine loss: No    Nocturnal enuresis: No    Leakage volume: Drops   Number of pads/day: 1    Bladder emptying: Complete   Voiding symptoms: Weak Stream   UTI in last 12 months: several   Other urologic history: none      Prolapse:     Prolapse symptoms: Bulge, Exteriorized Tissue, and Pelvic Pressure   Degree of prolapse: Beyond Introitus   Duration of prolapse symptoms: years      Bowel:    No bowel issues      Sexual  function:    Sexually active: Yes   Gender of partners: Male   Pain with intercourse: No       Pelvic Pain: No      Past medical and surgical history    Past obstetric history   Number of vaginal deliveries: 4   Number of  deliveries: 0   History of vacuum/forceps: Yes   History of obstetric anal sphincter injury: Yes    Past gynecological history:    Last menstrual period: No LMP recorded. Patient has had a hysterectomy.     Past medical history:  Past Medical History:   Diagnosis Date    Neoplasm of uncertain behavior of skin 2017    Cancer (HCC)     Diabetes (HCC)     Frequent UTI     HEMORRHOIDS     Hyperlipidemia     Hypertension     OSTEOPOROSIS     Thyroid disease     Urinary tract infection      Past surgical history:  Past Surgical History:   Procedure Laterality Date    COLONOSCOPY  2007    recheck 5 years    ABDOMINAL HYSTERECTOMY TOTAL      total    APPENDECTOMY      BLADDER SUSPENSION      KNEE ARTHROPLASTY TOTAL      TONSILLECTOMY       Medications:has a current medication list which includes the following prescription(s): estradiol, metformin er, lisinopril, dulaglutide, benzonatate, euthyrox, rosuvastatin, denosumab, oxybutynin sr, fluticasone, hydrocortisone, olopatadine hcl, calcium-vitamin d, multiple vitamin, hydrocortisone rectal, and aspirin ec.  Allergies:Other food and Boniva [ibandronic acid]  Family history:  Family History   Problem Relation Age of Onset    Heart Disease Mother     Hyperlipidemia Brother     Heart Disease Brother 65        MI    Heart Disease Maternal Grandmother     Hyperlipidemia Maternal Grandmother     Cancer Neg Hx     Ovarian Cancer Neg Hx     Tubal Cancer Neg Hx     Peritoneal Cancer Neg Hx     Colorectal Cancer Neg Hx     Breast Cancer Neg Hx      Social history: reports that she has never smoked. She has never used smokeless tobacco. She reports that she does not drink alcohol and does not use drugs.    Review of systems: A full review of systems  "was performed, and negative with the exception of want is noted above in the HPI.        Objective        Ht 5' 6\"   Wt 172 lb   BMI 27.76 kg/m²     Physical Exam  Vitals reviewed. Exam conducted with a chaperone present (MA - see notes.).   Constitutional:       Appearance: Normal appearance.   HENT:      Head: Normocephalic.      Mouth/Throat:      Mouth: Mucous membranes are moist.   Cardiovascular:      Rate and Rhythm: Normal rate.   Pulmonary:      Effort: Pulmonary effort is normal.   Abdominal:      Palpations: Abdomen is soft. There is no mass.      Tenderness: There is no abdominal tenderness.   Skin:     General: Skin is warm and dry.   Neurological:      Mental Status: She is alert.   Psychiatric:         Mood and Affect: Mood normal.       Genitourinary:    External female genitalia: WNL   Vulva: WNL   Bulbocavernosus reflex: Intact   Anal wink reflex: Intact   Perineal sensation: WNL   Urethra: WNL   Vagina: Atrophic   Atrophy: Moderate   Cough stress test: Negative    Pelvic floor:    POP-Q: Aa +2 / Ba +2 / TVL 8.5 / C -1 / D x / Ap -1 / Bp -1 / GH 4.5 / PB 2         Aa/Ba -2 with apex reduced    Palpable rectocele and attenuated perineal body   Prolapse stage: 3   Paravaginal defect: bilateral   Cervical elongation: No    Urethral tenderness: No    Bladder/ suprapubic tenderness: No    Levator tenderness: None   Levator muscle tone: Hypertonic   Pelvic floor contraction strength (modified Oxford scale): 2=Weak   Pelvic floor contraction duration: Brief    Bimanual exam: Uterus surgically absent, no palpable adnexal masses   Anal resting tone: Decreased   Anal squeeze tone: Decreased   Palpable anal sphincter defect: No    Granulation tissue: No    Epithelial erosions: No    Epithelial ulcerations: No    Fistula: None   Vaginal band/stricture: No     Procedure Performed: No    Diagnostic test and records review:    Urine dipstick: neg     Post-void residual: 85 mL, performed by Bladder " Scanner    Labs:     Radiology: n/a    Documentation reviewed: Prior EMR Records           Assessment & Plan     Ms.Melody Khloe Ramirez is a 67 y.o. year old P4 with recurrent urinary tract infection and symptomatic prolapse. We discussed my recommendations for further diagnosis and treatment at length today.     1. Vaginal vault prolapse  2. Cystocele, lateral  3. Rectocele  Ms. Ramirez has symptomatic pelvic organ prolapse,  predominant. I reviewed the clinical findings and discussed the pathogenesis extensively, including genetic tendency, aging, menopause and childbirth injuries. Also discussed options for management, including both nonsurgical and surgical options.   Nonsurgical options include both expectant management and pessary use.  I discussed different types of pessary as well as pessary care. The patient can be fitted with a pessary device in the office by a physician and the pessary can either be removed regularly by the patient or left in place, returning to the office every 3 to 6 months for evaluation. I also discussed concomitant treatment with vaginal estrogen at least 2 times a week to help prevent vaginal erosions and infection.  She is not interested in pessary therapy at this time, and I think the pessary may be difficult to fit due to her bellowing little hiatus and desire for intercourse.  She would likely need a space-occupying pessary like Gellhorn.  Surgical options include vaginal and abdominal reconstructive approaches, as well as obliterative approaches which close the vaginal canal. She desires continued ability to have penetrative intercourse and is not a candidate for obliterative approach.  I reviewed that abdominal approaches would include both native tissue repair or a mesh-augmented sacralcolpopexy, which is usually performed through robotic-assisted laparoscopy or sometimes through a Pfannenstiel skin incision for more complex cases. Lightweight synthetic mesh augmentation  via sacrocolpopexy is offered for abdominal approaches, which can reduce recurrence risk of prolapse but has a risk (1-3%) for mesh exposure. She is also a candidate for native tissue vaginal surgery which could include a sacrospinous vault suspension anterior/posterior repair, and perineorrhaphy.  There is approximately 20% long-term retreatment rate for native tissue approach, and given the fact that her anterior wall does not reduce completely and she has significant bilateral paravaginal detachments, I think she would be at a slightly higher risk of recurrence with native tissue only repair.  Additionally I would need to rebuild her perineal body and deviate the vagina which may not lead to the best sexual outcomes in comparison to the mesh augmented sacrocolpopexy procedure.  I discussed the technical details including risks and benefits of each of these options with the patient at length. All questions were answered.  If she opts for intervention, it would be a SSL F vault suspension with repairs, versus robotic sacrocolpopexy.  She opts for observation of prolapse at this time.    4. Recurrent UTI  She was was counseled on the pathophysiology of recurrent UTI. In the normal host, most uropathogens originate in the rectal gilma, colonize the periurethral area and urethra, and ascend to the bladder. Alteration of the normal vaginal gilma, especially loss of [id number] lactobacilli, may predispose women to vaginal colonization with bacteria and to UTI. Risk factors include sexual intercourse, family history, concurrent urinary incontinence, pessary use, and prior history of UTIs.   Counseled that bacteria in the urine without symptoms does not require treatment - conversely symptoms without bacteria on culture do not require antibiotics and further workup is required.   The importance of obtaining urine cultures with each bout of symptoms was emphasized. She should call/message our office when she has new  symptoms, and then present to the closest [facility] lab for a urinalysis and culture to guide treatment. If she is able to correctly predict 3 culture-proven UTI then standing self-treatment prescription can be discussed.  Standing urine culture ordered  Strongly recommend she start vaginal estrogen supplementation, which is the safest and most effective prevention for postmenopausal recurrent UTI by rebuilding a healthy vaginal micro biome and vaginal and periurethral tissue quality which decreases the chance of periurethral colonization by gut gilma.  If no improvement after vaginal estrogen therapy, I would recommend cystourethroscopy to rule out any structural causes for recurrent infection. Cystoscopy procedure reviewed, which will take place in the office without sedation. Risks of infection and rare risk of perforation discussed.     5. Atrophic vaginitis  Her exam confirms vaginal atrophy / genitorurinary syndrome of menopause. This is very common and due to low estrogen levels, which render the vaginal tissue thin, irritated, and open to colonization with gut gilma. This can lead to irritation, dryness, painful sex, urinary infections and urinary urgency. Discussed risks, benefits, and indications for vaginal estrogen therapy.  Vaginal estrogen has negligible absorption into the bloodstream and is not associated with increased risks for uterine or breast cancers. Prescription given for estrace vaginal cream to be placed inside vagina nightly for 2 weeks, then twice weekly thereafter. The effects of vaginal estrogen can take weeks to months.    - estradiol (ESTRACE VAGINAL) 0.1 MG/GM vaginal cream; Apply 1g cream inside vagina using applicator nightly for 2 weeks, then twice per week thereafter  Dispense: 1 Each; Refill: 3    6. Urinary incontinence, mixed  She has symptoms of both mixed urge and stress urinary incontinence and overactive bladder.  Since these are moderately bothersome at this time, I recommend  observation of possible physical therapy, and also observing whether estrogen has an impact on her urgency symptoms.  She is aware that if she undergoes any prolapse treatment this may change her urinary symptoms, and if she opts for surgery I recommend urodynamic testing in order to give the best advice on prevention of incontinence.  If estrogen alone is not sufficient, we can discuss oral medications for overactive bladder, and I will continue to recommend physical therapy for other urge suppression strategies.               Denisha Loja MD, FACOG    Female Pelvic Medicine and Reconstructive Surgery  Department of Obstetrics and Gynecology  [facility]        This medical record contains text that has been entered with the assistance of computer voice recognition and dictation software.  Therefore, it may contain unintended errors in text, spelling, punctuation, or grammar

## 2022-12-16 NOTE — PROGRESS NOTES
PT here today for consult   Ref for Cystocele with prolapse and Urinary Urgency   Hysterectomy? 1999  Good #: [phone] (home)   PVR : 85 mL  Pharmacy Verified

## 2022-12-20 RX ORDER — DULAGLUTIDE 1.5 MG/.5ML
INJECTION, SOLUTION SUBCUTANEOUS
Qty: 12 ML | Refills: 0 | Status: SHIPPED | OUTPATIENT
Start: 2022-12-20 | End: 2023-03-13

## 2023-01-26 ENCOUNTER — HOSPITAL ENCOUNTER (OUTPATIENT)
Dept: RADIOLOGY | Facility: MEDICAL CENTER | Age: 68
End: 2023-01-26
Attending: FAMILY MEDICINE
Payer: COMMERCIAL

## 2023-01-26 DIAGNOSIS — Z12.31 VISIT FOR SCREENING MAMMOGRAM: ICD-10-CM

## 2023-01-26 PROCEDURE — 77063 BREAST TOMOSYNTHESIS BI: CPT

## 2023-02-13 ENCOUNTER — HOSPITAL ENCOUNTER (OUTPATIENT)
Facility: MEDICAL CENTER | Age: 68
End: 2023-02-13
Attending: STUDENT IN AN ORGANIZED HEALTH CARE EDUCATION/TRAINING PROGRAM
Payer: COMMERCIAL

## 2023-02-13 PROCEDURE — 87086 URINE CULTURE/COLONY COUNT: CPT

## 2023-02-15 LAB
BACTERIA UR CULT: NORMAL
SIGNIFICANT IND 70042: NORMAL
SITE SITE: NORMAL
SOURCE SOURCE: NORMAL

## 2023-03-14 ENCOUNTER — TELEPHONE (OUTPATIENT)
Dept: MEDICAL GROUP | Facility: PHYSICIAN GROUP | Age: 68
End: 2023-03-14
Payer: COMMERCIAL

## 2023-03-14 NOTE — TELEPHONE ENCOUNTER
MEDICATION PRIOR AUTHORIZATION NEEDED:    1. Name of Medication: Trulicity    2. Requested By (Name of Pharmacy): [facility]     3. Is insurance on file current?       4. What is the name & phone number of the 3rd party payor      DOCUMENTATION OF PAR STATUS:    1. Name of Medication & Dose: Trulicity     2. Name of Prescription Coverage Company & phone #:     3. Date Prior Auth Submitted: 03/14/2023    4. What information was given to obtain insurance decision?      5. Prior Auth Status? Pending    6. Patient Notified: no

## 2023-03-17 ENCOUNTER — TELEPHONE (OUTPATIENT)
Dept: MEDICAL GROUP | Facility: PHYSICIAN GROUP | Age: 68
End: 2023-03-17
Payer: COMMERCIAL

## 2023-03-17 RX ORDER — DULAGLUTIDE 1.5 MG/.5ML
0.5 INJECTION, SOLUTION SUBCUTANEOUS
Qty: 6 ML | Refills: 0 | Status: SHIPPED | OUTPATIENT
Start: 2023-03-17 | End: 2023-08-16

## 2023-03-17 NOTE — TELEPHONE ENCOUNTER
FINAL PRIOR AUTHORIZATION STATUS:    1.  Name of Medication & Dose: TRULICITY 1.5MG/0.5ML PEN      2. Prior Auth Status: Denied.  Reason: SCANNED INTO MEDIA     3. Action Taken: Pharmacy Notified: N\A Patient Notified: N\A

## 2023-03-17 NOTE — TELEPHONE ENCOUNTER
It was not technically declined, there is a quantity limit. No more than 4 syringes (2 mL) per 28 days.    Resubmitted prescription stating it is a 90 day supply, which should manage the quantity limit.

## 2023-03-22 ENCOUNTER — HOSPITAL ENCOUNTER (OUTPATIENT)
Dept: LAB | Facility: MEDICAL CENTER | Age: 68
End: 2023-03-22
Attending: FAMILY MEDICINE
Payer: COMMERCIAL

## 2023-03-22 DIAGNOSIS — E11.9 TYPE 2 DIABETES MELLITUS WITHOUT COMPLICATION, WITHOUT LONG-TERM CURRENT USE OF INSULIN (HCC): ICD-10-CM

## 2023-03-22 LAB
ALBUMIN SERPL BCP-MCNC: 4.2 G/DL (ref 3.2–4.9)
ALBUMIN/GLOB SERPL: 1.3 G/DL
ALP SERPL-CCNC: 50 U/L (ref 30–99)
ALT SERPL-CCNC: 18 U/L (ref 2–50)
ANION GAP SERPL CALC-SCNC: 12 MMOL/L (ref 7–16)
AST SERPL-CCNC: 23 U/L (ref 12–45)
BILIRUB SERPL-MCNC: 0.3 MG/DL (ref 0.1–1.5)
BUN SERPL-MCNC: 13 MG/DL (ref 8–22)
CALCIUM ALBUM COR SERPL-MCNC: 8.9 MG/DL (ref 8.5–10.5)
CALCIUM SERPL-MCNC: 9.1 MG/DL (ref 8.5–10.5)
CHLORIDE SERPL-SCNC: 105 MMOL/L (ref 96–112)
CO2 SERPL-SCNC: 23 MMOL/L (ref 20–33)
CREAT SERPL-MCNC: 0.59 MG/DL (ref 0.5–1.4)
EST. AVERAGE GLUCOSE BLD GHB EST-MCNC: 157 MG/DL
GFR SERPLBLD CREATININE-BSD FMLA CKD-EPI: 99 ML/MIN/1.73 M 2
GLOBULIN SER CALC-MCNC: 3.2 G/DL (ref 1.9–3.5)
GLUCOSE SERPL-MCNC: 97 MG/DL (ref 65–99)
HBA1C MFR BLD: 7.1 % (ref 4–5.6)
POTASSIUM SERPL-SCNC: 4.2 MMOL/L (ref 3.6–5.5)
PROT SERPL-MCNC: 7.4 G/DL (ref 6–8.2)
SODIUM SERPL-SCNC: 140 MMOL/L (ref 135–145)

## 2023-03-22 PROCEDURE — 80053 COMPREHEN METABOLIC PANEL: CPT

## 2023-03-22 PROCEDURE — 36415 COLL VENOUS BLD VENIPUNCTURE: CPT

## 2023-03-22 PROCEDURE — 83036 HEMOGLOBIN GLYCOSYLATED A1C: CPT

## 2023-03-29 ENCOUNTER — OFFICE VISIT (OUTPATIENT)
Dept: MEDICAL GROUP | Facility: PHYSICIAN GROUP | Age: 68
End: 2023-03-29
Payer: COMMERCIAL

## 2023-03-29 VITALS
HEART RATE: 82 BPM | BODY MASS INDEX: 27.38 KG/M2 | WEIGHT: 170.4 LBS | TEMPERATURE: 97.2 F | DIASTOLIC BLOOD PRESSURE: 90 MMHG | HEIGHT: 66 IN | OXYGEN SATURATION: 96 % | SYSTOLIC BLOOD PRESSURE: 174 MMHG

## 2023-03-29 DIAGNOSIS — Z23 NEED FOR VACCINATION: ICD-10-CM

## 2023-03-29 DIAGNOSIS — E11.9 TYPE 2 DIABETES MELLITUS WITHOUT COMPLICATION (HCC): Primary | ICD-10-CM

## 2023-03-29 DIAGNOSIS — N81.9 VAGINAL VAULT PROLAPSE: ICD-10-CM

## 2023-03-29 DIAGNOSIS — I10 PRIMARY HYPERTENSION: ICD-10-CM

## 2023-03-29 LAB
HBA1C MFR BLD: 6.9 % (ref ?–5.8)
POCT INT CON NEG: NEGATIVE
POCT INT CON POS: POSITIVE

## 2023-03-29 PROCEDURE — 90472 IMMUNIZATION ADMIN EACH ADD: CPT | Performed by: FAMILY MEDICINE

## 2023-03-29 PROCEDURE — 90662 IIV NO PRSV INCREASED AG IM: CPT | Performed by: FAMILY MEDICINE

## 2023-03-29 PROCEDURE — 90471 IMMUNIZATION ADMIN: CPT | Performed by: FAMILY MEDICINE

## 2023-03-29 PROCEDURE — 99214 OFFICE O/P EST MOD 30 MIN: CPT | Mod: 25 | Performed by: FAMILY MEDICINE

## 2023-03-29 PROCEDURE — 83036 HEMOGLOBIN GLYCOSYLATED A1C: CPT | Performed by: FAMILY MEDICINE

## 2023-03-29 PROCEDURE — 90677 PCV20 VACCINE IM: CPT | Performed by: FAMILY MEDICINE

## 2023-03-29 ASSESSMENT — PATIENT HEALTH QUESTIONNAIRE - PHQ9: CLINICAL INTERPRETATION OF PHQ2 SCORE: 0

## 2023-03-29 ASSESSMENT — FIBROSIS 4 INDEX: FIB4 SCORE: 1.111167799007431824

## 2023-03-29 NOTE — PATIENT INSTRUCTIONS
"FOR YOUR BLOOD PRESSURE  - take 2 lisinoprils per day (you can take them together)  Home blood pressure monitoring:  - check your blood pressure every day and put it in a log  - pick a different time each day so we can see how it varies throughout the day  - when you check your blood pressure: make sure you sit quietly for 5-10 minutes beforehand, keep both feet flat on the ground, and make sure you use an arm cuff at heart height    Lifestyle factors to help manage blood pressure:  1) reduce stress with daily activity, consider yoga, breathing exercises (like 4-7-8 breathing technique)   2) reduce sodium to <2000 mg/day  3) DASH diet     4) 200-300 min/week cardio, which you can build up to.       Please check out \"life's essential 8\" at https://www.heart.org/en/healthy-living/healthy-lifestyle/lifes-essential-8   "

## 2023-03-29 NOTE — PROGRESS NOTES
"Subjective:     CC: DM, HTN, prolapse    HPI:   Christa presents today with    Problem   Vaginal Vault Prolapse    Has seen Dr. Loja. He started her on estrogen cream and that is starting to help. Considering different procedures for more definitive treatment.     Type 2 Diabetes Mellitus Without Complication (Hcc)    This is a chronic condition.  Current medications:  Insulin: -  Biguanide: metformin ER 1000 mg bid  GLP1-RA: trulicity 1.5 mg every 7 days  SGLT-2i: -  DPP4-I: -  TZD: -  Emperatriz: -  Sulfonyluria: -    Last A1c: 6.9% (3/2023); 7.0% (2022); 7.0% (2022); 7.8% (10/2021); 8.0% (2021)  Last Microalb/Cr ratio: normal (2022)  Fasting sugars: n/a  Last diabetic foot exam: 3/2023  Last retinal eye exam: 3/2022 - no DM retinopathy  ACEi/ARB: lisinopril 10 mg  Statin: rosuvastatin 40 mg  Aspirin: yes  Concomitant HTN: yes - at goal  Nightly foot checks: yes       Hypertension    This is a chronic condition.  Current Meds: lisinopril 10 mg daily  Side effects: none  Home BP Los-160s systolic  Associated symptoms: no cp, no sob                Health Maintenance: Completed    ROS:  See HPI    Objective:     Exam:  BP (!) 174/90 (BP Location: Left arm, Patient Position: Sitting, BP Cuff Size: Adult)   Pulse 82   Temp 36.2 °C (97.2 °F) (Temporal)   Ht 1.676 m (5' 6\")   Wt 77.3 kg (170 lb 6.4 oz)   SpO2 96%   BMI 27.50 kg/m²  Body mass index is 27.5 kg/m².    Physical Exam  Constitutional:       Appearance: Normal appearance.   Cardiovascular:      Rate and Rhythm: Normal rate and regular rhythm.      Heart sounds: Normal heart sounds.   Pulmonary:      Effort: Pulmonary effort is normal.      Breath sounds: Normal breath sounds.   Musculoskeletal:      Cervical back: Normal range of motion and neck supple.   Feet:      Comments: Diabetic foot exam: No lesions or calluses noted. 2+ pedal pulses. Sensation intact with 10 out of 10 on monofilament test.  Neurological:      Mental Status: She is alert. "       Assessment & Plan:     67 y.o. female with the following -     1. Type 2 diabetes mellitus without complication (HCC)  Chronic, controlled.  Hemoglobin A1c is under 7.0%.  She is up-to-date with almost all of her diabetic screenings, on an ACE inhibitor for renal protection, and is on a statin for cardiovascular protection.  We will continue the current regimen.  - Trulicity 1.5 mg every 7 days  - Metformin ER 1000 mg twice daily  - POCT Hemoglobin A1C  - Diabetic Monofilament Lower Extremity Exam    2. Vaginal vault prolapse  Chronic, stable.  She is established with urogynecology and does have vaginal vault prolapse including cystocele and rectocele.  She has been started on estrogen cream which is helping with the dryness sensation and discomfort.  Currently her only options are procedures and she is determining which one would be best for her.  She will follow-up with urogynecology as directed.    3. Primary hypertension  Chronic, uncontrolled.  Blood pressure is significantly elevated in the office today.  She reports that when she checks her blood pressure at home it is also in the 150/160 systolic.  Of note, her cuff at home is a wrist cuff but with blood pressures been elevated both at home and in the office we will adjust her regimen today.  - Increase lisinopril to 20 mg daily  - Check blood pressure at home daily until next visit  - I recommended she buy an automated arm cuff blood pressure machine  - We reviewed the appropriate position in order to get the most accurate blood pressures at home    4. Need for vaccination  - Pneumococcal Conjugate Vaccine 20-Valent (19 yrs+)  - INFLUENZA VACCINE, HIGH DOSE (65+ ONLY)    Return in about 4 weeks (around 4/26/2023) for F/u BP.    Please note that this dictation was created using voice recognition software. I have made every reasonable attempt to correct obvious errors, but I expect that there are errors of grammar and possibly content that I did not  discover before finalizing the note.

## 2023-03-31 RX ORDER — LEVOTHYROXINE SODIUM 112 UG/1
TABLET ORAL
Qty: 90 TABLET | Refills: 0 | Status: SHIPPED | OUTPATIENT
Start: 2023-03-31 | End: 2023-06-20

## 2023-04-13 DIAGNOSIS — E78.49 OTHER HYPERLIPIDEMIA: ICD-10-CM

## 2023-04-13 RX ORDER — ROSUVASTATIN CALCIUM 40 MG/1
40 TABLET, COATED ORAL DAILY
Qty: 90 TABLET | Refills: 3 | Status: SHIPPED | OUTPATIENT
Start: 2023-04-13

## 2023-04-24 ENCOUNTER — OFFICE VISIT (OUTPATIENT)
Dept: MEDICAL GROUP | Facility: PHYSICIAN GROUP | Age: 68
End: 2023-04-24
Payer: COMMERCIAL

## 2023-04-24 VITALS
TEMPERATURE: 97.7 F | BODY MASS INDEX: 27.58 KG/M2 | HEIGHT: 66 IN | HEART RATE: 84 BPM | WEIGHT: 171.6 LBS | OXYGEN SATURATION: 95 % | SYSTOLIC BLOOD PRESSURE: 166 MMHG | DIASTOLIC BLOOD PRESSURE: 100 MMHG

## 2023-04-24 DIAGNOSIS — E03.8 HYPOTHYROIDISM DUE TO HASHIMOTO'S THYROIDITIS: ICD-10-CM

## 2023-04-24 DIAGNOSIS — E06.3 HYPOTHYROIDISM DUE TO HASHIMOTO'S THYROIDITIS: ICD-10-CM

## 2023-04-24 DIAGNOSIS — I10 PRIMARY HYPERTENSION: Primary | ICD-10-CM

## 2023-04-24 PROCEDURE — 99214 OFFICE O/P EST MOD 30 MIN: CPT | Performed by: FAMILY MEDICINE

## 2023-04-24 RX ORDER — OXYBUTYNIN CHLORIDE 5 MG/1
TABLET, EXTENDED RELEASE ORAL
Qty: 90 TABLET | Refills: 3 | Status: SHIPPED | OUTPATIENT
Start: 2023-04-24 | End: 2023-10-20

## 2023-04-24 RX ORDER — CHLORTHALIDONE 25 MG/1
25 TABLET ORAL DAILY
Qty: 30 TABLET | Refills: 0 | Status: SHIPPED | OUTPATIENT
Start: 2023-04-24 | End: 2023-05-31

## 2023-04-24 RX ORDER — LISINOPRIL 10 MG/1
TABLET ORAL
Qty: 90 TABLET | Refills: 0 | Status: SHIPPED | OUTPATIENT
Start: 2023-04-24 | End: 2023-04-24 | Stop reason: SDUPTHER

## 2023-04-24 RX ORDER — LISINOPRIL 20 MG/1
10 TABLET ORAL DAILY
Qty: 30 TABLET | Refills: 1 | Status: SHIPPED | OUTPATIENT
Start: 2023-04-24 | End: 2023-07-19

## 2023-04-24 ASSESSMENT — FIBROSIS 4 INDEX: FIB4 SCORE: 1.111167799007431824

## 2023-04-24 NOTE — PROGRESS NOTES
"Subjective:     CC: f/u BP    HPI:   Christa presents today with    Problem   Hypertension    This is a chronic condition.  Current Meds: lisinopril 20 mg daily  Side effects: none  Home BP Los-170s/70s-100  (avg 157/91)  Associated symptoms: no cp, no sob            Health Maintenance: Completed    ROS:  See HPI    Objective:     Exam:  BP (!) 166/100 (BP Location: Left arm, Patient Position: Sitting, BP Cuff Size: Adult)   Pulse 84   Temp 36.5 °C (97.7 °F) (Temporal)   Ht 1.676 m (5' 6\")   Wt 77.8 kg (171 lb 9.6 oz)   SpO2 95%   BMI 27.70 kg/m²  Body mass index is 27.7 kg/m².    Physical Exam  Constitutional:       Appearance: Normal appearance.   Cardiovascular:      Rate and Rhythm: Normal rate and regular rhythm.      Heart sounds: Normal heart sounds.   Pulmonary:      Effort: Pulmonary effort is normal.      Breath sounds: Normal breath sounds.   Musculoskeletal:      Cervical back: Normal range of motion and neck supple.   Neurological:      Mental Status: She is alert.             Assessment & Plan:     67 y.o. female with the following -     1. Primary hypertension  Chronic, uncontrolled.  Despite increasing lisinopril her average blood pressure is still above 140/90 at home and in the office.  Therefore, we will adjust her regimen further.  - Continue lisinopril 20 mg daily  - Start chlorthalidone 25 mg daily  - Continue home blood pressure log  - Basic Metabolic Panel; Future    2. Hypothyroidism due to Hashimoto's thyroiditis  Chronic, status unknown.  She is due for yearly labs to evaluate her levothyroxine dosing.  This has been ordered and she will continue her current dosing until we have the results.  - Levothyroxine 112 mcg daily  - TSH WITH REFLEX TO FT4; Future    Return in about 4 weeks (around 2023) for F/u BP, home log.    Please note that this dictation was created using voice recognition software. I have made every reasonable attempt to correct obvious errors, but I expect " that there are errors of grammar and possibly content that I did not discover before finalizing the note.

## 2023-05-31 RX ORDER — CHLORTHALIDONE 25 MG/1
TABLET ORAL
Qty: 30 TABLET | Refills: 0 | Status: SHIPPED | OUTPATIENT
Start: 2023-05-31 | End: 2023-06-23

## 2023-06-14 ENCOUNTER — HOSPITAL ENCOUNTER (OUTPATIENT)
Dept: LAB | Facility: MEDICAL CENTER | Age: 68
End: 2023-06-14
Attending: FAMILY MEDICINE
Payer: COMMERCIAL

## 2023-06-14 ENCOUNTER — OFFICE VISIT (OUTPATIENT)
Dept: MEDICAL GROUP | Facility: PHYSICIAN GROUP | Age: 68
End: 2023-06-14
Payer: COMMERCIAL

## 2023-06-14 VITALS
SYSTOLIC BLOOD PRESSURE: 128 MMHG | HEART RATE: 89 BPM | DIASTOLIC BLOOD PRESSURE: 80 MMHG | WEIGHT: 170.6 LBS | TEMPERATURE: 96.9 F | BODY MASS INDEX: 27.42 KG/M2 | HEIGHT: 66 IN | OXYGEN SATURATION: 94 %

## 2023-06-14 DIAGNOSIS — E03.8 HYPOTHYROIDISM DUE TO HASHIMOTO'S THYROIDITIS: ICD-10-CM

## 2023-06-14 DIAGNOSIS — I10 PRIMARY HYPERTENSION: ICD-10-CM

## 2023-06-14 DIAGNOSIS — E06.3 HYPOTHYROIDISM DUE TO HASHIMOTO'S THYROIDITIS: ICD-10-CM

## 2023-06-14 LAB
ANION GAP SERPL CALC-SCNC: 16 MMOL/L (ref 7–16)
BUN SERPL-MCNC: 25 MG/DL (ref 8–22)
CALCIUM SERPL-MCNC: 10.5 MG/DL (ref 8.5–10.5)
CHLORIDE SERPL-SCNC: 101 MMOL/L (ref 96–112)
CO2 SERPL-SCNC: 26 MMOL/L (ref 20–33)
CREAT SERPL-MCNC: 0.9 MG/DL (ref 0.5–1.4)
GFR SERPLBLD CREATININE-BSD FMLA CKD-EPI: 70 ML/MIN/1.73 M 2
GLUCOSE SERPL-MCNC: 126 MG/DL (ref 65–99)
POTASSIUM SERPL-SCNC: 4 MMOL/L (ref 3.6–5.5)
SODIUM SERPL-SCNC: 143 MMOL/L (ref 135–145)
T4 FREE SERPL-MCNC: 1.81 NG/DL (ref 0.93–1.7)
TSH SERPL DL<=0.005 MIU/L-ACNC: 0.25 UIU/ML (ref 0.38–5.33)

## 2023-06-14 PROCEDURE — 3079F DIAST BP 80-89 MM HG: CPT | Performed by: FAMILY MEDICINE

## 2023-06-14 PROCEDURE — 84439 ASSAY OF FREE THYROXINE: CPT

## 2023-06-14 PROCEDURE — 3074F SYST BP LT 130 MM HG: CPT | Performed by: FAMILY MEDICINE

## 2023-06-14 PROCEDURE — 36415 COLL VENOUS BLD VENIPUNCTURE: CPT

## 2023-06-14 PROCEDURE — 99213 OFFICE O/P EST LOW 20 MIN: CPT | Performed by: FAMILY MEDICINE

## 2023-06-14 PROCEDURE — 80048 BASIC METABOLIC PNL TOTAL CA: CPT

## 2023-06-14 PROCEDURE — 84443 ASSAY THYROID STIM HORMONE: CPT

## 2023-06-14 ASSESSMENT — FIBROSIS 4 INDEX: FIB4 SCORE: 1.12800367474996867

## 2023-06-14 NOTE — LETTER
[facility]  Arlin Cota M.D.  [address]  [place] [address]  Fax: [phone]   Authorization for Release/Disclosure of   Protected Health Information   Name: RADHA MCQUEEN : 1955 SSN: [id number]   Address: [facility] [address]   [place] [address] Phone:    [phone] (home)    I authorize the entity listed below to release/disclose the PHI below to:   [facility]/Arlin Cota M.D. and Arlin Cota M.D.   Provider or Entity Name:  [facility]   Address   City, State, Zip   Phone:      Fax:     Reason for request: continuity of care   Information to be released:    [  ] LAST COLONOSCOPY,  including any PATH REPORT and follow-up  [  ] LAST FIT/COLOGUARD RESULT [  ] LAST DEXA  [  ] LAST MAMMOGRAM  [  ] LAST PAP  [  ] LAST LABS [XX] RETINA EXAM REPORT  [  ] IMMUNIZATION RECORDS  [  ] Release all info      [  ] Check here and initial the line next to each item to release ALL health information INCLUDING  _____ Care and treatment for drug and / or alcohol abuse  _____ HIV testing, infection status, or AIDS  _____ Genetic Testing    DATES OF SERVICE OR TIME PERIOD TO BE DISCLOSED: _____________  I understand and acknowledge that:  * This Authorization may be revoked at any time by you in writing, except if your health information has already been used or disclosed.  * Your health information that will be used or disclosed as a result of you signing this authorization could be re-disclosed by the recipient. If this occurs, your re-disclosed health information may no longer be protected by State or Federal laws.  * You may refuse to sign this Authorization. Your refusal will not affect your ability to obtain treatment.  * This Authorization becomes effective upon signing and will  on (date) __________.      If no date is indicated, this Authorization will  one (1) year from the signature date.    Name: Radha Mcqueen  Signature: Date:   2023      PLEASE FAX REQUESTED RECORDS BACK TO: [phone]

## 2023-06-14 NOTE — PROGRESS NOTES
"Subjective:     CC: BP    HPI:   Christa presents today with    Problem   Hypertension    This is a chronic condition.  Current Meds: lisinopril 10 mg daily, chlorthalidone 25 mg daily  Side effects: none  Home BP Los-150/60s-80s (avg 135/76)  Associated symptoms: no cp, no sob            Health Maintenance: Completed    ROS:  See HPI    Objective:     Exam:  /80 (BP Location: Right arm, Patient Position: Sitting, BP Cuff Size: Adult)   Pulse 89   Temp 36.1 °C (96.9 °F) (Temporal)   Ht 1.676 m (5' 6\")   Wt 77.4 kg (170 lb 9.6 oz)   SpO2 94%   BMI 27.54 kg/m²  Body mass index is 27.54 kg/m².    Physical Exam  Constitutional:       Appearance: Normal appearance.   Cardiovascular:      Rate and Rhythm: Normal rate and regular rhythm.      Heart sounds: Normal heart sounds.   Pulmonary:      Effort: Pulmonary effort is normal.      Breath sounds: Normal breath sounds.   Musculoskeletal:      Cervical back: Normal range of motion and neck supple.   Neurological:      Mental Status: She is alert.             Assessment & Plan:     68 y.o. female with the following -     1. Primary hypertension  Chronic, controlled.  Blood pressure is at goal under 140/90 in the office today.  We will continue the current regimen.  - Chlorthalidone 25 mg daily  - Lisinopril 10 mg daily    Return in about 4 months (around 10/14/2023) for Annual/wellness visit.    Please note that this dictation was created using voice recognition software. I have made every reasonable attempt to correct obvious errors, but I expect that there are errors of grammar and possibly content that I did not discover before finalizing the note.        "

## 2023-06-15 ENCOUNTER — TELEPHONE (OUTPATIENT)
Dept: MEDICAL GROUP | Facility: PHYSICIAN GROUP | Age: 68
End: 2023-06-15
Payer: COMMERCIAL

## 2023-06-15 DIAGNOSIS — M81.0 AGE-RELATED OSTEOPOROSIS WITHOUT CURRENT PATHOLOGICAL FRACTURE: ICD-10-CM

## 2023-06-15 NOTE — TELEPHONE ENCOUNTER
Phone Number Called: [phone]    Call outcome:  Spoke with [facility] staff     Message: Called to clarify what they needed for the pt's Prolia. They stated that we just need to fax over the prescription for it.

## 2023-06-15 NOTE — TELEPHONE ENCOUNTER
VOICEMAIL  1. Caller Name: [facility]                         Call Back Number: [phone]    2. Message: LVM requesting order for Prolia injection. Stated that the patient got their lab work done but they needed an order for the Prolia injection. Requested the new order be faxed to them at [phone]    Please advise.

## 2023-06-20 RX ORDER — LEVOTHYROXINE SODIUM 112 UG/1
112 TABLET ORAL DAILY
Qty: 90 TABLET | Refills: 0 | Status: SHIPPED | OUTPATIENT
Start: 2023-06-20 | End: 2023-10-02

## 2023-07-19 RX ORDER — LISINOPRIL 10 MG/1
10 TABLET ORAL DAILY
Qty: 90 TABLET | Refills: 3 | Status: SHIPPED | OUTPATIENT
Start: 2023-07-19

## 2023-07-27 DIAGNOSIS — E06.3 HYPOTHYROIDISM DUE TO HASHIMOTO'S THYROIDITIS: ICD-10-CM

## 2023-07-27 DIAGNOSIS — E03.8 HYPOTHYROIDISM DUE TO HASHIMOTO'S THYROIDITIS: ICD-10-CM

## 2023-09-25 ENCOUNTER — OFFICE VISIT (OUTPATIENT)
Dept: URGENT CARE | Facility: CLINIC | Age: 68
End: 2023-09-25
Payer: COMMERCIAL

## 2023-09-25 VITALS
BODY MASS INDEX: 27 KG/M2 | HEIGHT: 66 IN | DIASTOLIC BLOOD PRESSURE: 90 MMHG | OXYGEN SATURATION: 96 % | TEMPERATURE: 97.4 F | RESPIRATION RATE: 18 BRPM | HEART RATE: 81 BPM | WEIGHT: 168 LBS | SYSTOLIC BLOOD PRESSURE: 130 MMHG

## 2023-09-25 DIAGNOSIS — K11.20 PAROTITIS: ICD-10-CM

## 2023-09-25 DIAGNOSIS — H61.23 BILATERAL IMPACTED CERUMEN: ICD-10-CM

## 2023-09-25 PROCEDURE — 3075F SYST BP GE 130 - 139MM HG: CPT | Performed by: NURSE PRACTITIONER

## 2023-09-25 PROCEDURE — 99213 OFFICE O/P EST LOW 20 MIN: CPT | Performed by: NURSE PRACTITIONER

## 2023-09-25 PROCEDURE — 3080F DIAST BP >= 90 MM HG: CPT | Performed by: NURSE PRACTITIONER

## 2023-09-25 RX ORDER — METHYLPREDNISOLONE 4 MG/1
TABLET ORAL
Qty: 1 EACH | Refills: 0 | Status: SHIPPED | OUTPATIENT
Start: 2023-09-25 | End: 2023-10-20

## 2023-09-25 RX ORDER — AMOXICILLIN AND CLAVULANATE POTASSIUM 875; 125 MG/1; MG/1
1 TABLET, FILM COATED ORAL 2 TIMES DAILY
Qty: 20 TABLET | Refills: 0 | Status: SHIPPED | OUTPATIENT
Start: 2023-09-25 | End: 2023-10-05

## 2023-09-25 ASSESSMENT — ENCOUNTER SYMPTOMS
SHORTNESS OF BREATH: 0
CHILLS: 0
FEVER: 0
CONSTITUTIONAL NEGATIVE: 1
RESPIRATORY NEGATIVE: 1

## 2023-09-25 ASSESSMENT — VISUAL ACUITY: OU: 1

## 2023-09-25 NOTE — PROGRESS NOTES
Subjective:     Christa Ramirez is a 68 y.o. female who presents for Other (Swollen gland under left ear, very painful )       Other  This is a new problem. The problem has been gradually worsening. Pertinent negatives include no chills or fever.     Patient reports on Wednesday, she started to develop localized pain, swelling, and tenderness below her left ear. Getting worse. Also reports some hoarseness. Reports having had similar episode a long time ago.    Works as a teacher. Reports hearing seems to have some difficulty understanding what others are saying.    Review of Systems   Constitutional: Negative.  Negative for chills and fever.   HENT:  Positive for hearing loss.         Per HPI   Respiratory: Negative.  Negative for shortness of breath.    All other systems reviewed and are negative.    Refer to [facility] for additional details.    During this visit, appropriate PPE was worn, and hand hygiene was performed.    PMH:  has a past medical history of Cancer (HCC), Diabetes (HCC), Frequent UTI, HEMORRHOIDS, Hyperlipidemia, Hypertension, Neoplasm of uncertain behavior of skin (02/28/2017), OSTEOPOROSIS, Thyroid disease, and Urinary tract infection.    MEDS:   Current Outpatient Medications:     amoxicillin-clavulanate (AUGMENTIN) 875-125 MG Tab, Take 1 Tablet by mouth 2 times a day for 10 days., Disp: 20 Tablet, Rfl: 0    methylPREDNISolone (MEDROL DOSEPAK) 4 MG Tablet Therapy Pack, Use as directed on package. May take all of Day 1 as a single dose (24 mg) when starting., Disp: 1 Each, Rfl: 0    Dulaglutide (TRULICITY) 1.5 MG/0.5ML Solution Pen-injector, Inject 0.5 mL under the skin every 7 days., Disp: 6 mL, Rfl: 1    lisinopril (PRINIVIL) 10 MG Tab, Take 1 Tablet by mouth every day., Disp: 90 Tablet, Rfl: 3    metFORMIN ER (GLUCOPHAGE XR) 500 MG TABLET SR 24 HR, Take 2 tablets by mouth twice daily, Disp: 360 Tablet, Rfl: 3    chlorthalidone (HYGROTON) 25 MG Tab, Take 1 tablet by mouth once daily, Disp:  "90 Tablet, Rfl: 3    levothyroxine (SYNTHROID) 112 MCG Tab, Take 1 Tablet by mouth every day. Take only 6 days per week., Disp: 90 Tablet, Rfl: 0    denosumab (PROLIA) 60 MG/ML Solution Prefilled Syringe injection, Inject 1 mL under the skin every 6 months., Disp: 1 mL, Rfl: 1    oxybutynin SR (DITROPAN-XL) 5 MG TABLET SR 24 HR, Take 1 tablet by mouth once daily, Disp: 90 Tablet, Rfl: 3    rosuvastatin (CRESTOR) 40 MG tablet, Take 1 Tablet by mouth every day., Disp: 90 Tablet, Rfl: 3    estradiol (ESTRACE VAGINAL) 0.1 MG/GM vaginal cream, Apply 1g cream inside vagina using applicator nightly for 2 weeks, then twice per week thereafter, Disp: 1 Each, Rfl: 3    benzonatate (TESSALON) 100 MG Cap, Take 1-2 Capsules by mouth 3 times a day as needed for Cough., Disp: 160 Capsule, Rfl: 0    fluticasone (FLONASE) 50 MCG/ACT nasal spray, Administer 1 Spray into affected nostril(S) every day., Disp: 16 g, Rfl: 5    hydrocortisone 2.5 % Cream topical cream, Apply 1 Application topically 2 times a day as needed., Disp: 20 g, Rfl: 0    olopatadine HCl (PATADAY) 0.2 % ophthalmic solution, INSTILL 1 DROP INTO EACH EYE ONCE DAILY, Disp: , Rfl: 8    CALCIUM-VITAMIN D PO, Take  by mouth., Disp: , Rfl:     Multiple Vitamins-Minerals (MULTIVITAMIN PO), Take  by mouth., Disp: , Rfl:     hydrocortisone rectal (PROCTOCREAM-HC) 2.5 % Cream, Apply bid prn, Disp: 1 Tube, Rfl: 3    aspirin EC (ECOTRIN) 81 MG Tablet Delayed Response, Take 1 Tab by mouth every day., Disp: 30 Tab, Rfl:     ALLERGIES:   Allergies   Allergen Reactions    Other Food Anaphylaxis     Walnuts     Boniva [Ibandronic Acid]      \"right side numbness\"     SURGHX:   Past Surgical History:   Procedure Laterality Date    COLONOSCOPY  2007    recheck 5 years    ABDOMINAL HYSTERECTOMY TOTAL  1999    total    APPENDECTOMY      BLADDER SUSPENSION      KNEE ARTHROPLASTY TOTAL      TONSILLECTOMY       SOCHX:  reports that she has never smoked. She has never used smokeless tobacco. " "She reports that she does not drink alcohol and does not use drugs.    FH: Per HPI as applicable/pertinent.      Objective:     BP (!) 130/90 (BP Location: Left arm, Patient Position: Sitting, BP Cuff Size: Adult)   Pulse 81   Temp 36.3 °C (97.4 °F) (Temporal)   Resp 18   Ht 1.676 m (5' 6\")   Wt 76.2 kg (168 lb)   SpO2 96%   BMI 27.12 kg/m²     Physical Exam  Nursing note reviewed.   Constitutional:       General: She is not in acute distress.     Appearance: She is well-developed. She is not ill-appearing or toxic-appearing.   HENT:      Head:      Jaw: Tenderness and swelling present. No trismus.        Right Ear: There is impacted cerumen.      Left Ear: There is impacted cerumen.      Mouth/Throat:      Mouth: Mucous membranes are moist.      Pharynx: Oropharynx is clear. Uvula midline. No pharyngeal swelling, oropharyngeal exudate or posterior oropharyngeal erythema.   Eyes:      General: Vision grossly intact.   Cardiovascular:      Rate and Rhythm: Normal rate.   Pulmonary:      Effort: Pulmonary effort is normal. No respiratory distress.   Musculoskeletal:         General: No deformity. Normal range of motion.   Skin:     General: Skin is warm and dry.      Coloration: Skin is not pale.      Findings: No bruising, erythema or rash.   Neurological:      Mental Status: She is alert and oriented to person, place, and time.      Motor: No weakness.   Psychiatric:         Behavior: Behavior normal. Behavior is cooperative.       Assessment/Plan:     1. Parotitis  - amoxicillin-clavulanate (AUGMENTIN) 875-125 MG Tab; Take 1 Tablet by mouth 2 times a day for 10 days.  Dispense: 20 Tablet; Refill: 0  - methylPREDNISolone (MEDROL DOSEPAK) 4 MG Tablet Therapy Pack; Use as directed on package. May take all of Day 1 as a single dose (24 mg) when starting.  Dispense: 1 Each; Refill: 0    2. Bilateral impacted cerumen    Rx as above sent electronically.     Ear lavage was performed in bilateral external auditory " canals with large amount of cerumen removed by MA. Patient tolerated well. No complications. Re-examination reveals bilateral external auditory canals clear of cerumen. TMs intact with no erythema, bulging, or perforation. Patient reports hearing has improved in both ears.     Differential diagnosis, natural history, supportive care, rest, fluids, over-the-counter symptom management per 's instructions, close monitoring, and indications for immediate follow-up discussed.     All questions answered. Patient agrees with the plan of care.    Discharge summary provided.

## 2023-10-02 RX ORDER — LEVOTHYROXINE SODIUM 112 UG/1
TABLET ORAL
Qty: 90 TABLET | Refills: 0 | Status: SHIPPED | OUTPATIENT
Start: 2023-10-02 | End: 2024-01-02

## 2023-10-17 SDOH — ECONOMIC STABILITY: HOUSING INSECURITY
IN THE LAST 12 MONTHS, WAS THERE A TIME WHEN YOU DID NOT HAVE A STEADY PLACE TO SLEEP OR SLEPT IN A SHELTER (INCLUDING NOW)?: NO

## 2023-10-17 SDOH — ECONOMIC STABILITY: INCOME INSECURITY: IN THE LAST 12 MONTHS, WAS THERE A TIME WHEN YOU WERE NOT ABLE TO PAY THE MORTGAGE OR RENT ON TIME?: NO

## 2023-10-17 SDOH — ECONOMIC STABILITY: FOOD INSECURITY: WITHIN THE PAST 12 MONTHS, YOU WORRIED THAT YOUR FOOD WOULD RUN OUT BEFORE YOU GOT MONEY TO BUY MORE.: NEVER TRUE

## 2023-10-17 SDOH — ECONOMIC STABILITY: HOUSING INSECURITY: IN THE LAST 12 MONTHS, HOW MANY PLACES HAVE YOU LIVED?: 1

## 2023-10-17 SDOH — ECONOMIC STABILITY: TRANSPORTATION INSECURITY
IN THE PAST 12 MONTHS, HAS LACK OF TRANSPORTATION KEPT YOU FROM MEETINGS, WORK, OR FROM GETTING THINGS NEEDED FOR DAILY LIVING?: NO

## 2023-10-17 SDOH — HEALTH STABILITY: PHYSICAL HEALTH: ON AVERAGE, HOW MANY MINUTES DO YOU ENGAGE IN EXERCISE AT THIS LEVEL?: 20 MIN

## 2023-10-17 SDOH — ECONOMIC STABILITY: FOOD INSECURITY: WITHIN THE PAST 12 MONTHS, THE FOOD YOU BOUGHT JUST DIDN'T LAST AND YOU DIDN'T HAVE MONEY TO GET MORE.: NEVER TRUE

## 2023-10-17 SDOH — ECONOMIC STABILITY: INCOME INSECURITY: HOW HARD IS IT FOR YOU TO PAY FOR THE VERY BASICS LIKE FOOD, HOUSING, MEDICAL CARE, AND HEATING?: NOT HARD AT ALL

## 2023-10-17 SDOH — HEALTH STABILITY: PHYSICAL HEALTH: ON AVERAGE, HOW MANY DAYS PER WEEK DO YOU ENGAGE IN MODERATE TO STRENUOUS EXERCISE (LIKE A BRISK WALK)?: 4 DAYS

## 2023-10-17 SDOH — HEALTH STABILITY: MENTAL HEALTH
STRESS IS WHEN SOMEONE FEELS TENSE, NERVOUS, ANXIOUS, OR CAN'T SLEEP AT NIGHT BECAUSE THEIR MIND IS TROUBLED. HOW STRESSED ARE YOU?: ONLY A LITTLE

## 2023-10-17 SDOH — ECONOMIC STABILITY: TRANSPORTATION INSECURITY
IN THE PAST 12 MONTHS, HAS LACK OF RELIABLE TRANSPORTATION KEPT YOU FROM MEDICAL APPOINTMENTS, MEETINGS, WORK OR FROM GETTING THINGS NEEDED FOR DAILY LIVING?: NO

## 2023-10-17 SDOH — ECONOMIC STABILITY: TRANSPORTATION INSECURITY
IN THE PAST 12 MONTHS, HAS THE LACK OF TRANSPORTATION KEPT YOU FROM MEDICAL APPOINTMENTS OR FROM GETTING MEDICATIONS?: NO

## 2023-10-17 ASSESSMENT — SOCIAL DETERMINANTS OF HEALTH (SDOH)
HOW OFTEN DO YOU ATTEND CHURCH OR RELIGIOUS SERVICES?: MORE THAN 4 TIMES PER YEAR
WITHIN THE PAST 12 MONTHS, YOU WORRIED THAT YOUR FOOD WOULD RUN OUT BEFORE YOU GOT THE MONEY TO BUY MORE: NEVER TRUE
HOW OFTEN DO YOU ATTEND CHURCH OR RELIGIOUS SERVICES?: MORE THAN 4 TIMES PER YEAR
HOW OFTEN DO YOU HAVE A DRINK CONTAINING ALCOHOL: NEVER
HOW OFTEN DO YOU HAVE SIX OR MORE DRINKS ON ONE OCCASION: NEVER
HOW OFTEN DO YOU ATTENT MEETINGS OF THE CLUB OR ORGANIZATION YOU BELONG TO?: MORE THAN 4 TIMES PER YEAR
HOW OFTEN DO YOU GET TOGETHER WITH FRIENDS OR RELATIVES?: MORE THAN THREE TIMES A WEEK
HOW MANY DRINKS CONTAINING ALCOHOL DO YOU HAVE ON A TYPICAL DAY WHEN YOU ARE DRINKING: PATIENT DOES NOT DRINK
DO YOU BELONG TO ANY CLUBS OR ORGANIZATIONS SUCH AS CHURCH GROUPS UNIONS, FRATERNAL OR ATHLETIC GROUPS, OR SCHOOL GROUPS?: YES
HOW HARD IS IT FOR YOU TO PAY FOR THE VERY BASICS LIKE FOOD, HOUSING, MEDICAL CARE, AND HEATING?: NOT HARD AT ALL
IN A TYPICAL WEEK, HOW MANY TIMES DO YOU TALK ON THE PHONE WITH FAMILY, FRIENDS, OR NEIGHBORS?: MORE THAN THREE TIMES A WEEK
DO YOU BELONG TO ANY CLUBS OR ORGANIZATIONS SUCH AS CHURCH GROUPS UNIONS, FRATERNAL OR ATHLETIC GROUPS, OR SCHOOL GROUPS?: YES
HOW OFTEN DO YOU GET TOGETHER WITH FRIENDS OR RELATIVES?: MORE THAN THREE TIMES A WEEK
IN A TYPICAL WEEK, HOW MANY TIMES DO YOU TALK ON THE PHONE WITH FAMILY, FRIENDS, OR NEIGHBORS?: MORE THAN THREE TIMES A WEEK
HOW OFTEN DO YOU ATTENT MEETINGS OF THE CLUB OR ORGANIZATION YOU BELONG TO?: MORE THAN 4 TIMES PER YEAR

## 2023-10-17 ASSESSMENT — LIFESTYLE VARIABLES
AUDIT-C TOTAL SCORE: 0
HOW OFTEN DO YOU HAVE SIX OR MORE DRINKS ON ONE OCCASION: NEVER
SKIP TO QUESTIONS 9-10: 1
HOW MANY STANDARD DRINKS CONTAINING ALCOHOL DO YOU HAVE ON A TYPICAL DAY: PATIENT DOES NOT DRINK
HOW OFTEN DO YOU HAVE A DRINK CONTAINING ALCOHOL: NEVER

## 2023-10-20 ENCOUNTER — OFFICE VISIT (OUTPATIENT)
Dept: MEDICAL GROUP | Facility: PHYSICIAN GROUP | Age: 68
End: 2023-10-20
Payer: COMMERCIAL

## 2023-10-20 VITALS
TEMPERATURE: 97.4 F | WEIGHT: 169.4 LBS | HEART RATE: 84 BPM | OXYGEN SATURATION: 95 % | HEIGHT: 66 IN | DIASTOLIC BLOOD PRESSURE: 78 MMHG | BODY MASS INDEX: 27.23 KG/M2 | SYSTOLIC BLOOD PRESSURE: 120 MMHG

## 2023-10-20 DIAGNOSIS — I10 PRIMARY HYPERTENSION: ICD-10-CM

## 2023-10-20 DIAGNOSIS — Z23 NEED FOR VACCINATION: ICD-10-CM

## 2023-10-20 DIAGNOSIS — Z00.00 WELLNESS EXAMINATION: Primary | ICD-10-CM

## 2023-10-20 DIAGNOSIS — E11.9 TYPE 2 DIABETES MELLITUS WITHOUT COMPLICATION, WITHOUT LONG-TERM CURRENT USE OF INSULIN (HCC): ICD-10-CM

## 2023-10-20 PROCEDURE — 90662 IIV NO PRSV INCREASED AG IM: CPT | Performed by: FAMILY MEDICINE

## 2023-10-20 PROCEDURE — 90471 IMMUNIZATION ADMIN: CPT | Performed by: FAMILY MEDICINE

## 2023-10-20 PROCEDURE — 3074F SYST BP LT 130 MM HG: CPT | Performed by: FAMILY MEDICINE

## 2023-10-20 PROCEDURE — 99397 PER PM REEVAL EST PAT 65+ YR: CPT | Mod: 25 | Performed by: FAMILY MEDICINE

## 2023-10-20 PROCEDURE — 3078F DIAST BP <80 MM HG: CPT | Performed by: FAMILY MEDICINE

## 2023-10-20 NOTE — PROGRESS NOTES
Subjective:     CC:   Chief Complaint   Patient presents with    Annual Exam       HPI:   Christa Ramirez is a 64 y.o. female who presents for annual exam. She is feeling well and denies any complaints.    Ob-Gyn/ History:    Patient has GYN provider: no  /Para:  5/4  Last Pap Smear:  . No history of abnormal pap smears.  Gyn Surgery:  hysterectomy.  Current Contraceptive Method:  N/a. Yes currently sexually active.  Last menstrual period:  N/a.  No significant bloating/fluid retention, pelvic pain, or dyspareunia. No vaginal discharge  Post-menopausal bleeding: no  Urinary incontinence: improved, urge only  Folate intake: n/a     Health Maintenance  Advanced directive: n/a   Osteoporosis Screen/ DEXA: n/a   PT/vit D for falls prevention: n/a   Cholesterol Screenin2022 - T chol 154, , HDL 53, LDL 81; rosuvastatin   Diabetes Screening: n/a - has DM   Aspirin Use: yes      Anticipatory Guidance  Diet: healthy   Exercise: regular - recumbent bike, walk, swim in summer   Substance Abuse: no   Safe in relationship.   Seat belts, bike helmet, gun safety discussed.  Sun protection used.  Dental home.     Cancer screening  Colorectal Cancer Screening: due    Lung Cancer Screening: n/a - never smoker  Cervical Cancer Screening: n/a - hysterectomy   Breast Cancer Screening: due 2024    Infectious disease screening/Immunizations  --STI Screening: declined   --Practices safe sex.  --HIV Screening: reports completed   --Hepatitis C Screening: completed - neg ()  --Immunizations:    Influenza: today    HPV:  n/a    Tetanus: due 2023    Shingles: completed    Pneumococcal : n/a     Hepatatitis B: completed   COVID-19: completed   Other immunizations: n/a    She  has a past medical history of Cancer (HCC), Diabetes (HCC), Frequent UTI, HEMORRHOIDS, Hyperlipidemia, Hypertension, Neoplasm of uncertain behavior of skin (2017), OSTEOPOROSIS, Thyroid disease, and Urinary tract  infection.  She  has a past surgical history that includes bladder suspension; colonoscopy (2007); knee arthroplasty total; appendectomy; tonsillectomy; abdominal hysterectomy total (1999); and tubal coagulation laparoscopic bilateral.    Family History   Problem Relation Age of Onset    Heart Disease Mother     Hyperlipidemia Brother     Heart Disease Brother 65        MI    Heart Disease Maternal Grandmother     Hyperlipidemia Maternal Grandmother     Cancer Neg Hx     Ovarian Cancer Neg Hx     Tubal Cancer Neg Hx     Peritoneal Cancer Neg Hx     Colorectal Cancer Neg Hx     Breast Cancer Neg Hx        Social History     Socioeconomic History    Marital status:      Spouse name: Not on file    Number of children: Not on file    Years of education: Not on file    Highest education level: Professional school degree (e.g., MD, DDS, DVM, CASSIE)   Occupational History    Not on file   Tobacco Use    Smoking status: Never    Smokeless tobacco: Never   Vaping Use    Vaping Use: Never used   Substance and Sexual Activity    Alcohol use: Never    Drug use: Never    Sexual activity: Yes     Partners: Female     Birth control/protection: Post-Menopausal     Comment: , 4 children, teacher @ [employer]   Other Topics Concern    Not on file   Social History Narrative    Not on file     Social Determinants of Health     Financial Resource Strain: Low Risk  (10/17/2023)    Overall Financial Resource Strain (CARDIA)     Difficulty of Paying Living Expenses: Not hard at all   Food Insecurity: No Food Insecurity (10/17/2023)    Hunger Vital Sign     Worried About Running Out of Food in the Last Year: Never true     Ran Out of Food in the Last Year: Never true   Transportation Needs: No Transportation Needs (10/17/2023)    PRAPARE - Transportation     Lack of Transportation (Medical): No     Lack of Transportation (Non-Medical): No   Physical Activity: Insufficiently Active (10/17/2023)    Exercise Vital Sign     Days of  Exercise per Week: 4 days     Minutes of Exercise per Session: 20 min   Stress: No Stress Concern Present (10/17/2023)    [employer] [place] [employer] - Occupational Stress Questionnaire     Feeling of Stress : Only a little   Social Connections: Socially Integrated (10/17/2023)    Social Connection and Isolation Panel [NHANES]     Frequency of Communication with Friends and Family: More than three times a week     Frequency of Social Gatherings with Friends and Family: More than three times a week     Attends Confucianism Services: More than 4 times per year     Active Member of Clubs or Organizations: Yes     Attends Club or Organization Meetings: More than 4 times per year     Marital Status:    Intimate Partner Violence: Not on file   Housing Stability: Low Risk  (10/17/2023)    Housing Stability Vital Sign     Unable to Pay for Housing in the Last Year: No     Number of Places Lived in the Last Year: 1     Unstable Housing in the Last Year: No       Patient Active Problem List    Diagnosis Date Noted    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    Cystocele, lateral 12/16/2022    Rectocele 12/16/2022    Atrophic vaginitis 12/16/2022    Urinary incontinence, mixed 12/16/2022    Cough 08/04/2022    Urinary urgency 10/25/2021    Seasonal allergies 07/02/2021    Palpitation 10/11/2019    Chronic pain of right knee 11/10/2017    Vitamin D deficiency 11/10/2017    Age-related osteoporosis without current pathological fracture 02/16/2016    Hypothyroid 11/18/2012    Type 2 diabetes mellitus without complication (HCC) 11/18/2012    Obesity (BMI 30-39.9) 11/18/2012    Hypertension     Hyperlipidemia     Recurrent UTI     Hemorrhoid          Current Outpatient Medications   Medication Sig Dispense Refill    levothyroxine (SYNTHROID) 112 MCG Tab TAKE 1 TABLET BY MOUTH ONCE DAILY FOR 6 DAYS OF THE WEEK 90 Tablet 0    Dulaglutide (TRULICITY) 1.5 MG/0.5ML Solution Pen-injector Inject 0.5 mL under the skin every 7  "days. 6 mL 1    lisinopril (PRINIVIL) 10 MG Tab Take 1 Tablet by mouth every day. 90 Tablet 3    metFORMIN ER (GLUCOPHAGE XR) 500 MG TABLET SR 24 HR Take 2 tablets by mouth twice daily 360 Tablet 3    chlorthalidone (HYGROTON) 25 MG Tab Take 1 tablet by mouth once daily 90 Tablet 3    denosumab (PROLIA) 60 MG/ML Solution Prefilled Syringe injection Inject 1 mL under the skin every 6 months. 1 mL 1    rosuvastatin (CRESTOR) 40 MG tablet Take 1 Tablet by mouth every day. 90 Tablet 3    estradiol (ESTRACE VAGINAL) 0.1 MG/GM vaginal cream Apply 1g cream inside vagina using applicator nightly for 2 weeks, then twice per week thereafter 1 Each 3    fluticasone (FLONASE) 50 MCG/ACT nasal spray Administer 1 Spray into affected nostril(S) every day. 16 g 5    hydrocortisone 2.5 % Cream topical cream Apply 1 Application topically 2 times a day as needed. 20 g 0    olopatadine HCl (PATADAY) 0.2 % ophthalmic solution INSTILL 1 DROP INTO EACH EYE ONCE DAILY  8    CALCIUM-VITAMIN D PO Take  by mouth.      Multiple Vitamins-Minerals (MULTIVITAMIN PO) Take  by mouth.      hydrocortisone rectal (PROCTOCREAM-HC) 2.5 % Cream Apply bid prn 1 Tube 3    aspirin EC (ECOTRIN) 81 MG Tablet Delayed Response Take 1 Tab by mouth every day. 30 Tab      No current facility-administered medications for this visit.     Allergies   Allergen Reactions    Other Food Anaphylaxis     Walnuts     Boniva [Ibandronic Acid]      \"right side numbness\"       Review of Systems   Constitutional: Negative for fever, chills.   HENT: Negative for congestion.    Eyes: Negative for pain.   Respiratory: Negative for shortness of breath.    Cardiovascular: Negative for leg swelling.   Gastrointestinal: Negative for abdominal pain.   Genitourinary: Negative for hematuria.   Skin: Negative for rash.   Neurological: Negative for dizziness.   Endo/Heme/Allergies: Does not bleed easily.   Psychiatric/Behavioral: Negative for depression.  The patient is not " "nervous/anxious.      Objective:     /78 (BP Location: Right arm, Patient Position: Sitting, BP Cuff Size: Adult)   Pulse 84   Temp 36.3 °C (97.4 °F) (Temporal)   Ht 1.676 m (5' 6\")   Wt 76.8 kg (169 lb 6.4 oz)   SpO2 95%   BMI 27.34 kg/m²   Body mass index is 27.34 kg/m².  Wt Readings from Last 4 Encounters:   10/20/23 76.8 kg (169 lb 6.4 oz)   09/25/23 76.2 kg (168 lb)   06/14/23 77.4 kg (170 lb 9.6 oz)   04/24/23 77.8 kg (171 lb 9.6 oz)       Physical Exam:  Constitutional: Well-developed and well-nourished. Not diaphoretic. No distress.   Skin: Skin is warm and dry. No rash noted.  Head: Atraumatic without lesions.  Eyes: Conjunctivae and extraocular motions are normal. Pupils are equal, round, and reactive to light. No scleral icterus.   Ears:  External ears unremarkable. Tympanic membrane clear and intact bilaterally.  Mouth/Throat: Dentition is good. Tongue normal. Oropharynx is clear and moist. Posterior pharynx without erythema or exudates.  Neck: Supple, trachea midline. Normal range of motion. No thyromegaly present. No lymphadenopathy--cervical or supraclavicular.  Cardiovascular: Regular rate and rhythm, S1 and S2 without murmur, rubs, or gallops.  Lungs: Normal inspiratory effort, CTA bilaterally, no wheezes/rhonchi/rales  Abdomen: Soft, non tender, and without distention. Active bowel sounds in all four quadrants. No rebound, guarding, masses or HSM.  Extremities: No cyanosis, clubbing, erythema, nor edema. Distal pulses intact and symmetric.   Musculoskeletal: All major joints AROM full in all directions without pain.  Neurological: Alert and oriented x 3. DTRs 2+/3 and symmetric. No cranial nerve deficit. 5/5 myotomes. Sensation intact.   Psychiatric:  Behavior, mood, and affect are appropriate.    Assessment and Plan:     1. Wellness examination  Hemoglobin A1c    Lipid Profile    Microalbumin Creat Ratio Urine - Lab Collect    CBC WITHOUT DIFFERENTIAL      2. Type 2 diabetes mellitus " without complication, without long-term current use of insulin (HCC)  Hemoglobin A1c    Lipid Profile    Microalbumin Creat Ratio Urine - Lab Collect      3. Primary hypertension  CBC WITHOUT DIFFERENTIAL      4. Need for vaccination  INFLUENZA VACCINE, HIGH DOSE (65+ ONLY)          HCM:  Up to date   Labs per orders  Immunizations per orders  Patient counseled about skin care, diet, supplements, prenatal vitamins, safe sex and exercise.    Elevated BP  She will monitor at home and if consistently >140/90 she'll follow up.    Follow-up: Return in about 6 months (around 4/20/2024) for f/u DM, get A1c.

## 2023-10-23 RX ORDER — FLUTICASONE PROPIONATE 50 MCG
1 SPRAY, SUSPENSION (ML) NASAL DAILY
Qty: 16 G | Refills: 0 | Status: SHIPPED | OUTPATIENT
Start: 2023-10-23

## 2023-10-28 ENCOUNTER — HOSPITAL ENCOUNTER (OUTPATIENT)
Dept: LAB | Facility: MEDICAL CENTER | Age: 68
End: 2023-10-28
Attending: FAMILY MEDICINE
Payer: COMMERCIAL

## 2023-10-28 DIAGNOSIS — E11.9 TYPE 2 DIABETES MELLITUS WITHOUT COMPLICATION, WITHOUT LONG-TERM CURRENT USE OF INSULIN (HCC): ICD-10-CM

## 2023-10-28 DIAGNOSIS — Z00.00 WELLNESS EXAMINATION: ICD-10-CM

## 2023-10-28 DIAGNOSIS — E06.3 HYPOTHYROIDISM DUE TO HASHIMOTO'S THYROIDITIS: ICD-10-CM

## 2023-10-28 DIAGNOSIS — E03.8 HYPOTHYROIDISM DUE TO HASHIMOTO'S THYROIDITIS: ICD-10-CM

## 2023-10-28 DIAGNOSIS — I10 PRIMARY HYPERTENSION: ICD-10-CM

## 2023-10-28 LAB
CHOLEST SERPL-MCNC: 132 MG/DL (ref 100–199)
CREAT UR-MCNC: 127.96 MG/DL
ERYTHROCYTE [DISTWIDTH] IN BLOOD BY AUTOMATED COUNT: 48.7 FL (ref 35.9–50)
EST. AVERAGE GLUCOSE BLD GHB EST-MCNC: 154 MG/DL
FASTING STATUS PATIENT QL REPORTED: NORMAL
HBA1C MFR BLD: 7 % (ref 4–5.6)
HCT VFR BLD AUTO: 42.9 % (ref 37–47)
HDLC SERPL-MCNC: 49 MG/DL
HGB BLD-MCNC: 13.3 G/DL (ref 12–16)
LDLC SERPL CALC-MCNC: 60 MG/DL
MCH RBC QN AUTO: 26.6 PG (ref 27–33)
MCHC RBC AUTO-ENTMCNC: 31 G/DL (ref 32.2–35.5)
MCV RBC AUTO: 85.8 FL (ref 81.4–97.8)
MICROALBUMIN UR-MCNC: 2 MG/DL
MICROALBUMIN/CREAT UR: 16 MG/G (ref 0–30)
PLATELET # BLD AUTO: 356 K/UL (ref 164–446)
PMV BLD AUTO: 9.1 FL (ref 9–12.9)
RBC # BLD AUTO: 5 M/UL (ref 4.2–5.4)
T4 FREE SERPL-MCNC: 1.76 NG/DL (ref 0.93–1.7)
TRIGL SERPL-MCNC: 115 MG/DL (ref 0–149)
TSH SERPL DL<=0.005 MIU/L-ACNC: 0.15 UIU/ML (ref 0.38–5.33)
WBC # BLD AUTO: 6.3 K/UL (ref 4.8–10.8)

## 2023-10-28 PROCEDURE — 36415 COLL VENOUS BLD VENIPUNCTURE: CPT

## 2023-10-28 PROCEDURE — 80061 LIPID PANEL: CPT

## 2023-10-28 PROCEDURE — 82043 UR ALBUMIN QUANTITATIVE: CPT

## 2023-10-28 PROCEDURE — 84439 ASSAY OF FREE THYROXINE: CPT

## 2023-10-28 PROCEDURE — 83036 HEMOGLOBIN GLYCOSYLATED A1C: CPT

## 2023-10-28 PROCEDURE — 85027 COMPLETE CBC AUTOMATED: CPT

## 2023-10-28 PROCEDURE — 84443 ASSAY THYROID STIM HORMONE: CPT

## 2023-10-28 PROCEDURE — 82570 ASSAY OF URINE CREATININE: CPT

## 2023-12-14 ENCOUNTER — HOSPITAL ENCOUNTER (OUTPATIENT)
Dept: LAB | Facility: MEDICAL CENTER | Age: 68
End: 2023-12-14
Attending: FAMILY MEDICINE
Payer: COMMERCIAL

## 2023-12-14 DIAGNOSIS — M81.0 AGE-RELATED OSTEOPOROSIS WITHOUT CURRENT PATHOLOGICAL FRACTURE: ICD-10-CM

## 2023-12-14 LAB
ANION GAP SERPL CALC-SCNC: 10 MMOL/L (ref 7–16)
BUN SERPL-MCNC: 24 MG/DL (ref 8–22)
CALCIUM SERPL-MCNC: 10.8 MG/DL (ref 8.5–10.5)
CHLORIDE SERPL-SCNC: 100 MMOL/L (ref 96–112)
CO2 SERPL-SCNC: 31 MMOL/L (ref 20–33)
CREAT SERPL-MCNC: 0.98 MG/DL (ref 0.5–1.4)
GFR SERPLBLD CREATININE-BSD FMLA CKD-EPI: 63 ML/MIN/1.73 M 2
GLUCOSE SERPL-MCNC: 185 MG/DL (ref 65–99)
POTASSIUM SERPL-SCNC: 3.8 MMOL/L (ref 3.6–5.5)
SODIUM SERPL-SCNC: 141 MMOL/L (ref 135–145)

## 2023-12-14 PROCEDURE — 80048 BASIC METABOLIC PNL TOTAL CA: CPT

## 2023-12-14 PROCEDURE — 36415 COLL VENOUS BLD VENIPUNCTURE: CPT

## 2023-12-15 ENCOUNTER — TELEPHONE (OUTPATIENT)
Dept: MEDICAL GROUP | Facility: PHYSICIAN GROUP | Age: 68
End: 2023-12-15

## 2023-12-15 ENCOUNTER — NON-PROVIDER VISIT (OUTPATIENT)
Dept: MEDICAL GROUP | Facility: PHYSICIAN GROUP | Age: 68
End: 2023-12-15
Payer: COMMERCIAL

## 2023-12-15 DIAGNOSIS — Z23 NEED FOR VACCINATION: ICD-10-CM

## 2023-12-15 PROCEDURE — 90471 IMMUNIZATION ADMIN: CPT | Performed by: FAMILY MEDICINE

## 2023-12-15 PROCEDURE — 90715 TDAP VACCINE 7 YRS/> IM: CPT | Performed by: FAMILY MEDICINE

## 2024-01-02 RX ORDER — LEVOTHYROXINE SODIUM 112 UG/1
TABLET ORAL
Qty: 90 TABLET | Refills: 0 | Status: SHIPPED | OUTPATIENT
Start: 2024-01-02

## 2024-01-26 DIAGNOSIS — E03.8 HYPOTHYROIDISM DUE TO HASHIMOTO'S THYROIDITIS: ICD-10-CM

## 2024-01-26 DIAGNOSIS — E06.3 HYPOTHYROIDISM DUE TO HASHIMOTO'S THYROIDITIS: ICD-10-CM

## 2024-01-29 ENCOUNTER — HOSPITAL ENCOUNTER (OUTPATIENT)
Dept: RADIOLOGY | Facility: MEDICAL CENTER | Age: 69
End: 2024-01-29
Attending: FAMILY MEDICINE
Payer: COMMERCIAL

## 2024-01-29 DIAGNOSIS — Z12.31 VISIT FOR SCREENING MAMMOGRAM: ICD-10-CM

## 2024-01-29 PROCEDURE — 77067 SCR MAMMO BI INCL CAD: CPT

## 2024-02-08 ENCOUNTER — HOSPITAL ENCOUNTER (OUTPATIENT)
Dept: LAB | Facility: MEDICAL CENTER | Age: 69
End: 2024-02-08
Attending: FAMILY MEDICINE
Payer: COMMERCIAL

## 2024-02-08 DIAGNOSIS — E06.3 HYPOTHYROIDISM DUE TO HASHIMOTO'S THYROIDITIS: ICD-10-CM

## 2024-02-08 DIAGNOSIS — E03.8 HYPOTHYROIDISM DUE TO HASHIMOTO'S THYROIDITIS: ICD-10-CM

## 2024-02-08 LAB — TSH SERPL DL<=0.005 MIU/L-ACNC: 1.73 UIU/ML (ref 0.38–5.33)

## 2024-02-08 PROCEDURE — 84443 ASSAY THYROID STIM HORMONE: CPT

## 2024-02-08 PROCEDURE — 36415 COLL VENOUS BLD VENIPUNCTURE: CPT

## 2024-02-20 RX ORDER — DULAGLUTIDE 1.5 MG/.5ML
INJECTION, SOLUTION SUBCUTANEOUS
Qty: 12 ML | Refills: 0 | Status: SHIPPED | OUTPATIENT
Start: 2024-02-20 | End: 2024-03-08 | Stop reason: RX

## 2024-02-20 NOTE — TELEPHONE ENCOUNTER
Received request via: Pharmacy    Was the patient seen in the last year in this department? Yes    Does the patient have an active prescription (recently filled or refills available) for medication(s) requested? No    Pharmacy Name: [facility] [address] [place] [address], [place] - [address]     Does the patient have [facility] Plus and need 100 day supply (blood pressure, diabetes and cholesterol meds only)? Patient does not have SCP

## 2024-02-26 ENCOUNTER — OFFICE VISIT (OUTPATIENT)
Dept: MEDICAL GROUP | Facility: PHYSICIAN GROUP | Age: 69
End: 2024-02-26
Payer: COMMERCIAL

## 2024-02-26 VITALS
TEMPERATURE: 97.6 F | SYSTOLIC BLOOD PRESSURE: 130 MMHG | OXYGEN SATURATION: 96 % | BODY MASS INDEX: 27.45 KG/M2 | HEART RATE: 76 BPM | DIASTOLIC BLOOD PRESSURE: 64 MMHG | HEIGHT: 66 IN | WEIGHT: 170.8 LBS

## 2024-02-26 DIAGNOSIS — Z12.11 COLON CANCER SCREENING: ICD-10-CM

## 2024-02-26 DIAGNOSIS — M81.0 AGE-RELATED OSTEOPOROSIS WITHOUT CURRENT PATHOLOGICAL FRACTURE: Primary | ICD-10-CM

## 2024-02-26 DIAGNOSIS — R13.19 OTHER DYSPHAGIA: ICD-10-CM

## 2024-02-26 PROCEDURE — 3075F SYST BP GE 130 - 139MM HG: CPT | Performed by: FAMILY MEDICINE

## 2024-02-26 PROCEDURE — 99214 OFFICE O/P EST MOD 30 MIN: CPT | Performed by: FAMILY MEDICINE

## 2024-02-26 PROCEDURE — 3078F DIAST BP <80 MM HG: CPT | Performed by: FAMILY MEDICINE

## 2024-02-26 ASSESSMENT — FIBROSIS 4 INDEX: FIB4 SCORE: 1.02

## 2024-02-26 ASSESSMENT — PATIENT HEALTH QUESTIONNAIRE - PHQ9: CLINICAL INTERPRETATION OF PHQ2 SCORE: 0

## 2024-02-26 NOTE — PROGRESS NOTES
"Verbal consent was acquired by the patient to use [employer] ambient listening note generation during this visit     Subjective:     HPI:   History of Present Illness  The patient is a 68-year-old woman using she and her pronouns who presents for evaluation of multiple medical concerns.    She goes to [facility], but the [facility] is more expensive, so they wanted her to get into the infusion center. She is supposed to have another scan in 2 years. She was switched from alendronate to Prolia in 2017 due to severe degeneration in the hip. There was an interaction with one of her medications with estrogen. She was on oral estradiol. She had 2 doses of Prolia and there was a slight improvement. She denies any side effects from Prolia. She is still taking the calcium and vitamin D combo. She does strength and resistance training. Her last dose of Prolia was in 12/2023.    She denies any chest pain or trouble breathing in the last few days. She denies any fevers or chills.    She has noticed that if she eats a piece of bread, it gets stuck behind the breast bone. This has been going on for about 9 months. She has to drink some water and then it gets better.   She is allergic to ACTONEL.   She uses over-the-counter olopatadine eye drops.    Health Maintenance: Completed    Objective:     Exam:  /64 (BP Location: Left arm, Patient Position: Sitting, BP Cuff Size: Adult)   Pulse 76   Temp 36.4 °C (97.6 °F) (Temporal)   Ht 1.676 m (5' 6\")   Wt 77.5 kg (170 lb 12.8 oz)   SpO2 96%   BMI 27.57 kg/m²  Body mass index is 27.57 kg/m².    Physical Exam  Constitutional:       Appearance: Normal appearance.   Cardiovascular:      Rate and Rhythm: Normal rate and regular rhythm.      Heart sounds: Normal heart sounds.   Pulmonary:      Effort: Pulmonary effort is normal.      Breath sounds: Normal breath sounds.   Musculoskeletal:      Cervical back: Normal range of motion and neck supple. "   Lymphadenopathy:      Cervical: No cervical adenopathy.   Neurological:      Mental Status: She is alert.             Results      Assessment & Plan:     1. Age-related osteoporosis without current pathological fracture  DS-BONE DENSITY STUDY (DEXA)      2. Other dysphagia        3. Colon cancer screening  Referral to GI for Colonoscopy          Assessment & Plan  1. Age-related osteoporosis without current pathological fracture.  This is chronic and stable. She has had osteoporosis since at least 2008. She has failed multiple bisphosphonates either due to an allergic reaction, interaction with one of her medications, or ineffective based on bone density scan results. She has been on Prolia since 03/2023 and she has been tolerating it well. We will continue the Prolia 60 mg every 6 months. I have ordered a bone density scan that she will complete in the next month or so to evaluate the effectiveness of the Prolia. She will continue her calcium and vitamin D supplementation as well as her strength resistance training.    2. Other dysphagia.  This is chronic and stable. She reports for the last 9 months, she has been noticing a little bit of trouble swallowing only with bread, feels like it gets stuck behind the breast bone and she has to drink some water and then it gets better. She is already due for a colonoscopy and I have placed a referral today, so I recommended that when she go to schedule the colonoscopy, she also mentioned the dysphagia as they may want to do an EGD at the same time to see if esophageal dilatation may be beneficial for her.      Please note that this dictation was created using voice recognition software. I have made every reasonable attempt to correct obvious errors, but I expect that there are errors of grammar and possibly content that I did not discover before finalizing the note.

## 2024-03-21 ENCOUNTER — HOSPITAL ENCOUNTER (OUTPATIENT)
Dept: RADIOLOGY | Facility: MEDICAL CENTER | Age: 69
End: 2024-03-21
Attending: FAMILY MEDICINE
Payer: COMMERCIAL

## 2024-03-21 DIAGNOSIS — M81.0 AGE-RELATED OSTEOPOROSIS WITHOUT CURRENT PATHOLOGICAL FRACTURE: ICD-10-CM

## 2024-03-21 PROCEDURE — 77080 DXA BONE DENSITY AXIAL: CPT

## 2024-04-03 RX ORDER — LEVOTHYROXINE SODIUM 112 UG/1
TABLET ORAL
Qty: 90 TABLET | Refills: 3 | Status: SHIPPED | OUTPATIENT
Start: 2024-04-03

## 2024-04-03 NOTE — TELEPHONE ENCOUNTER
Received request via: Pharmacy    Was the patient seen in the last year in this department? Yes    Does the patient have an active prescription (recently filled or refills available) for medication(s) requested? No    Pharmacy Name: [facility]    Does the patient have longterm Plus and need 100 day supply (blood pressure, diabetes and cholesterol meds only)? Patient does not have SCP

## 2024-04-22 DIAGNOSIS — E78.49 OTHER HYPERLIPIDEMIA: ICD-10-CM

## 2024-04-22 RX ORDER — ROSUVASTATIN CALCIUM 40 MG/1
40 TABLET, COATED ORAL DAILY
Qty: 90 TABLET | Refills: 3 | Status: SHIPPED | OUTPATIENT
Start: 2024-04-22

## 2024-04-22 NOTE — TELEPHONE ENCOUNTER
Received request via: Pharmacy    Was the patient seen in the last year in this department? Yes    Does the patient have an active prescription (recently filled or refills available) for medication(s) requested? No    Pharmacy Name: [facility] [address] [place] [address], [place] - [address]     Does the patient have [facility] Plus and need 100 day supply (blood pressure, diabetes and cholesterol meds only)? Patient does not have SCP

## 2024-05-10 ENCOUNTER — APPOINTMENT (OUTPATIENT)
Dept: MEDICAL GROUP | Facility: PHYSICIAN GROUP | Age: 69
End: 2024-05-10
Payer: COMMERCIAL

## 2024-05-15 ENCOUNTER — APPOINTMENT (OUTPATIENT)
Dept: MEDICAL GROUP | Facility: PHYSICIAN GROUP | Age: 69
End: 2024-05-15
Payer: COMMERCIAL

## 2024-05-15 VITALS
HEIGHT: 66 IN | OXYGEN SATURATION: 96 % | DIASTOLIC BLOOD PRESSURE: 68 MMHG | BODY MASS INDEX: 26.74 KG/M2 | WEIGHT: 166.4 LBS | SYSTOLIC BLOOD PRESSURE: 112 MMHG | TEMPERATURE: 97.2 F | HEART RATE: 80 BPM

## 2024-05-15 DIAGNOSIS — E11.9 TYPE 2 DIABETES MELLITUS WITHOUT COMPLICATION, WITHOUT LONG-TERM CURRENT USE OF INSULIN (HCC): Primary | ICD-10-CM

## 2024-05-15 DIAGNOSIS — M81.0 AGE-RELATED OSTEOPOROSIS WITHOUT CURRENT PATHOLOGICAL FRACTURE: ICD-10-CM

## 2024-05-15 LAB
HBA1C MFR BLD: 7.4 % (ref ?–5.8)
POCT INT CON NEG: NEGATIVE
POCT INT CON POS: POSITIVE

## 2024-05-15 PROCEDURE — 3074F SYST BP LT 130 MM HG: CPT | Performed by: FAMILY MEDICINE

## 2024-05-15 PROCEDURE — 83036 HEMOGLOBIN GLYCOSYLATED A1C: CPT | Performed by: FAMILY MEDICINE

## 2024-05-15 PROCEDURE — 3078F DIAST BP <80 MM HG: CPT | Performed by: FAMILY MEDICINE

## 2024-05-15 PROCEDURE — 99214 OFFICE O/P EST MOD 30 MIN: CPT | Performed by: FAMILY MEDICINE

## 2024-05-15 ASSESSMENT — FIBROSIS 4 INDEX: FIB4 SCORE: 1.02

## 2024-05-15 NOTE — PROGRESS NOTES
"Verbal consent was acquired by the patient to use [employer] ambient listening note generation during this visit     Subjective:     HPI:   History of Present Illness  The patient is a 68-year-old woman who is here today to follow up on diabetes. Back in 03/2024, we did transition her to Mounjaro as Trulicity was unavailable in any pharmacies and because she had been off the Trulicity for 4 weeks, we did have to start her back on a low dose of Mounjaro.    She noticed back pain, nausea, and lightheadedness while on the Mounjaro. The patient discontinued Mounjaro approximately 2 weeks ago, resulting in a significant improvement in her mid-back pain within a week. She denies experiencing abdominal pain, vomiting, or diarrhea. The pain did not exacerbate postprandially. She reports tolerating the initial dose of Mounjaro well, but noticed a significant difference when the dosage was increased. She also realized she had back pain with Trulicity as well, which intensified with the use of Mounjaro. She expresses a desire to avoid these medications. Her current medication regimen includes metformin 1000 mg twice daily. Her stools and appetite remain unchanged. She denies experiencing heart failure, chest pain, difficulty breathing, fevers, or chills. She monitors her blood glucose levels daily. She is scheduled for an eye examination in 07/2024.    The patient's bone density scan conducted in 03/2024 revealed osteopenia.    She has a family history of heart issues.    Health Maintenance: Completed    Objective:     Exam:  /68 (BP Location: Right arm, Patient Position: Sitting, BP Cuff Size: Adult)   Pulse 80   Temp 36.2 °C (97.2 °F) (Temporal)   Ht 1.676 m (5' 6\")   Wt 75.5 kg (166 lb 6.4 oz)   SpO2 96%   BMI 26.86 kg/m²  Body mass index is 26.86 kg/m².    Physical Exam  Constitutional:       Appearance: Normal appearance.   Cardiovascular:      Rate and Rhythm: Normal rate and regular rhythm.      Heart " sounds: Normal heart sounds.   Pulmonary:      Effort: Pulmonary effort is normal.      Breath sounds: Normal breath sounds.   Musculoskeletal:      Cervical back: Normal range of motion and neck supple.   Feet:      Comments: Diabetic foot exam: No lesions or calluses noted. 1+ pedal pulses. Sensation intact with 10 out of 10 on monofilament test.  Lymphadenopathy:      Cervical: No cervical adenopathy.   Neurological:      Mental Status: She is alert.             Results  Laboratory Studies  A1c is 7.4%.    Imaging  Bone density scan showed osteopenia in the lower back and left hip.    Assessment & Plan:     1. Type 2 diabetes mellitus without complication, without long-term current use of insulin ([facility])  POCT Hemoglobin A1C    Diabetic Monofilament Lower Extremity Exam      2. Age-related osteoporosis without current pathological fracture            Assessment & Plan  1. Type 2 diabetes.  The patient's condition is chronic and uncontrolled. Hemoglobin A1c is above 7.0% today. Previous attempts at treatment with Mounjaro were unsuccessful due to associated back pain and nausea. Additionally, Trulicity also induced back pain, prompting her preference to avoid the GLP-1 agonist for the time being. Instead, we will maintain the current regimen of metformin extended-release 1000 mg twice daily and add empagliflozin 10 mg daily to her regimen.    2. Osteoporosis.  The patient's condition is chronic and stable. She has been on Prolia since 03/2022, and a recent bone density scan earlier this year revealed a slight improvement in her bone density in her lower back and stable in the left hip. We will persist with Prolia 60 mg every 6 months and continue to monitor the DEXA every 2 years.    Return in about 3 months (around 8/15/2024) for f/u DM, get A1c.    Please note that this dictation was created using voice recognition software. I have made every reasonable attempt to correct obvious errors, but I expect that there  are errors of grammar and possibly content that I did not discover before finalizing the note.

## 2024-06-13 RX ORDER — CHLORTHALIDONE 25 MG/1
TABLET ORAL
Qty: 90 TABLET | Refills: 3 | Status: SHIPPED | OUTPATIENT
Start: 2024-06-13

## 2024-06-13 NOTE — TELEPHONE ENCOUNTER
Received request via: Pharmacy    Was the patient seen in the last year in this department? Yes    Does the patient have an active prescription (recently filled or refills available) for medication(s) requested? No    Ph[facility] Name: [facility]    Does the patient have alf Plus and need 100 day supply (blood pressure, diabetes and cholesterol meds only)? Patient does not have SCP

## 2024-06-18 ENCOUNTER — HOSPITAL ENCOUNTER (OUTPATIENT)
Dept: LAB | Facility: MEDICAL CENTER | Age: 69
End: 2024-06-18
Attending: FAMILY MEDICINE
Payer: COMMERCIAL

## 2024-06-18 DIAGNOSIS — E11.9 TYPE 2 DIABETES MELLITUS WITHOUT COMPLICATION, WITHOUT LONG-TERM CURRENT USE OF INSULIN (HCC): ICD-10-CM

## 2024-06-18 LAB
ALBUMIN SERPL BCP-MCNC: 4.2 G/DL (ref 3.2–4.9)
ALBUMIN/GLOB SERPL: 1.5 G/DL
ALP SERPL-CCNC: 47 U/L (ref 30–99)
ALT SERPL-CCNC: 22 U/L (ref 2–50)
ANION GAP SERPL CALC-SCNC: 13 MMOL/L (ref 7–16)
AST SERPL-CCNC: 30 U/L (ref 12–45)
BILIRUB SERPL-MCNC: 0.3 MG/DL (ref 0.1–1.5)
BUN SERPL-MCNC: 29 MG/DL (ref 8–22)
CALCIUM ALBUM COR SERPL-MCNC: 10.5 MG/DL (ref 8.5–10.5)
CALCIUM SERPL-MCNC: 10.7 MG/DL (ref 8.5–10.5)
CHLORIDE SERPL-SCNC: 101 MMOL/L (ref 96–112)
CO2 SERPL-SCNC: 27 MMOL/L (ref 20–33)
CREAT SERPL-MCNC: 0.89 MG/DL (ref 0.5–1.4)
GFR SERPLBLD CREATININE-BSD FMLA CKD-EPI: 71 ML/MIN/1.73 M 2
GLOBULIN SER CALC-MCNC: 2.8 G/DL (ref 1.9–3.5)
GLUCOSE SERPL-MCNC: 109 MG/DL (ref 65–99)
POTASSIUM SERPL-SCNC: 3.8 MMOL/L (ref 3.6–5.5)
PROT SERPL-MCNC: 7 G/DL (ref 6–8.2)
SODIUM SERPL-SCNC: 141 MMOL/L (ref 135–145)

## 2024-06-18 PROCEDURE — 80053 COMPREHEN METABOLIC PANEL: CPT

## 2024-06-18 PROCEDURE — 36415 COLL VENOUS BLD VENIPUNCTURE: CPT

## 2024-06-18 NOTE — TELEPHONE ENCOUNTER
Received request via: Pharmacy    Was the patient seen in the last year in this department? Yes    Does the patient have an active prescription (recently filled or refills available) for medication(s) requested? No   Price (Do Not Change): 0.00 Instructions: This plan will send the code FBSE to the PM system.  DO NOT or CHANGE the price. Detail Level: Simple

## 2024-06-21 RX ORDER — FLUTICASONE PROPIONATE 50 MCG
1 SPRAY, SUSPENSION (ML) NASAL DAILY
Qty: 16 G | Refills: 0 | Status: SHIPPED | OUTPATIENT
Start: 2024-06-21

## 2024-06-21 NOTE — TELEPHONE ENCOUNTER
Received request via: Pharmacy    Was the patient seen in the last year in this department? Yes    Does the patient have an active prescription (recently filled or refills available) for medication(s) requested? No    Pharmacy Name:     Does the patient have [facility] Plus and need 100 day supply (blood pressure, diabetes and cholesterol meds only)? Patient does not have SCP

## 2024-06-28 RX ORDER — LISINOPRIL 10 MG/1
10 TABLET ORAL DAILY
Qty: 90 TABLET | Refills: 0 | Status: SHIPPED | OUTPATIENT
Start: 2024-06-28

## 2024-06-28 RX ORDER — METFORMIN HYDROCHLORIDE 500 MG/1
TABLET, EXTENDED RELEASE ORAL
Qty: 360 TABLET | Refills: 0 | Status: SHIPPED | OUTPATIENT
Start: 2024-06-28

## 2024-06-28 NOTE — TELEPHONE ENCOUNTER
Received request via: Pharmacy    Was the patient seen in the last year in this department? Yes    Does the patient have an active prescription (recently filled or refills available) for medication(s) requested? No    Pharmacy Name: [facility] [address] [place] [address], [place] - [address]     Does the patient have [facility] Plus and need 100 day supply (blood pressure, diabetes and cholesterol meds only)? Patient does not have SCP

## 2024-08-05 RX ORDER — EMPAGLIFLOZIN 10 MG/1
1 TABLET, FILM COATED ORAL DAILY
Qty: 90 TABLET | Refills: 0 | Status: SHIPPED | OUTPATIENT
Start: 2024-08-05 | End: 2024-08-21

## 2024-08-05 NOTE — TELEPHONE ENCOUNTER
Received request via: Pharmacy    Was the patient seen in the last year in this department? Yes    Does the patient have an active prescription (recently filled or refills available) for medication(s) requested? No    [facility]    Does the patient have [facility] Plus and need 100 day supply (blood pressure, diabetes and cholesterol meds only)? Patient does not have SCP

## 2024-08-19 ENCOUNTER — APPOINTMENT (OUTPATIENT)
Dept: MEDICAL GROUP | Facility: PHYSICIAN GROUP | Age: 69
End: 2024-08-19
Payer: COMMERCIAL

## 2024-08-21 ENCOUNTER — OFFICE VISIT (OUTPATIENT)
Dept: MEDICAL GROUP | Facility: PHYSICIAN GROUP | Age: 69
End: 2024-08-21
Payer: COMMERCIAL

## 2024-08-21 VITALS
OXYGEN SATURATION: 94 % | HEART RATE: 74 BPM | WEIGHT: 167 LBS | DIASTOLIC BLOOD PRESSURE: 68 MMHG | HEIGHT: 66 IN | RESPIRATION RATE: 16 BRPM | BODY MASS INDEX: 26.84 KG/M2 | TEMPERATURE: 97.6 F | SYSTOLIC BLOOD PRESSURE: 112 MMHG

## 2024-08-21 DIAGNOSIS — E11.65 TYPE 2 DIABETES MELLITUS WITH HYPERGLYCEMIA, WITHOUT LONG-TERM CURRENT USE OF INSULIN (HCC): Primary | ICD-10-CM

## 2024-08-21 DIAGNOSIS — G89.29 CHRONIC BILATERAL THORACIC BACK PAIN: ICD-10-CM

## 2024-08-21 DIAGNOSIS — M54.6 CHRONIC BILATERAL THORACIC BACK PAIN: ICD-10-CM

## 2024-08-21 PROBLEM — K57.30 DIVERTICULOSIS OF LARGE INTESTINE WITHOUT PERFORATION OR ABSCESS WITHOUT BLEEDING: Status: ACTIVE | Noted: 2024-06-14

## 2024-08-21 LAB
HBA1C MFR BLD: 8.1 % (ref ?–5.8)
POCT INT CON NEG: NEGATIVE
POCT INT CON POS: POSITIVE

## 2024-08-21 PROCEDURE — 83036 HEMOGLOBIN GLYCOSYLATED A1C: CPT | Performed by: FAMILY MEDICINE

## 2024-08-21 PROCEDURE — 3074F SYST BP LT 130 MM HG: CPT | Performed by: FAMILY MEDICINE

## 2024-08-21 PROCEDURE — 99214 OFFICE O/P EST MOD 30 MIN: CPT | Performed by: FAMILY MEDICINE

## 2024-08-21 PROCEDURE — 3078F DIAST BP <80 MM HG: CPT | Performed by: FAMILY MEDICINE

## 2024-08-21 RX ORDER — OMEGA-3/DHA/EPA/FISH OIL 60 MG-90MG
CAPSULE ORAL
COMMUNITY

## 2024-08-21 RX ORDER — TIRZEPATIDE 2.5 MG/.5ML
2.5 INJECTION, SOLUTION SUBCUTANEOUS
Qty: 2 ML | Refills: 0 | Status: SHIPPED | OUTPATIENT
Start: 2024-08-21 | End: 2024-09-18

## 2024-08-21 RX ORDER — TIRZEPATIDE 5 MG/.5ML
5 INJECTION, SOLUTION SUBCUTANEOUS
Qty: 4 ML | Refills: 0 | Status: SHIPPED | OUTPATIENT
Start: 2024-09-18

## 2024-08-21 ASSESSMENT — FIBROSIS 4 INDEX: FIB4 SCORE: 1.22

## 2024-08-21 NOTE — LETTER
[facility]  Arlin Cota M.D.  [address]  [place] [address]  Fax: [phone]   Authorization for Release/Disclosure of   Protected Health Information   Name: RADHA MCQUEEN : 1955 SSN: [id number]   Address: [facility] [address]   [place] [address] Phone:    [phone] (home)    I authorize the entity listed below to release/disclose the PHI below to:   [facility]/Arlin Cota M.D. and Arlin Cota M.D.   Provider or Entity Name:  [facility]   Address   City, State, Zip   Phone:      Fax:     Reason for request: continuity of care   Information to be released:    [XX] LAST COLONOSCOPY,  including any PATH REPORT and follow-up  [  ] LAST FIT/COLOGUARD RESULT [  ] LAST DEXA  [  ] LAST MAMMOGRAM  [  ] LAST PAP  [  ] LAST LABS [  ] RETINA EXAM REPORT  [  ] IMMUNIZATION RECORDS  [  ] Release all info      [  ] Check here and initial the line next to each item to release ALL health information INCLUDING  _____ Care and treatment for drug and / or alcohol abuse  _____ HIV testing, infection status, or AIDS  _____ Genetic Testing    DATES OF SERVICE OR TIME PERIOD TO BE DISCLOSED: _____________  I understand and acknowledge that:  * This Authorization may be revoked at any time by you in writing, except if your health information has already been used or disclosed.  * Your health information that will be used or disclosed as a result of you signing this authorization could be re-disclosed by the recipient. If this occurs, your re-disclosed health information may no longer be protected by State or Federal laws.  * You may refuse to sign this Authorization. Your refusal will not affect your ability to obtain treatment.  * This Authorization becomes effective upon signing and will  on (date) __________.      If no date is indicated, this Authorization will  one (1) year from the signature date.    Name: Radha Mcqueen  Signature: Date:    8/21/2024     PLEASE FAX REQUESTED RECORDS BACK TO: [phone]

## 2024-08-21 NOTE — PROGRESS NOTES
"Verbal consent was acquired by the patient to use [email or web address] ambient listening note generation during this visit     Subjective:     HPI:   History of Present Illness  The patient presents for evaluation of multiple medical concerns.    She reports no side effects from Jardiance, but has experienced increased urination since starting the medication. She is also on metformin, taking two 500 mg tablets twice daily. Her weight has remained stable, and she has been mindful of her diet.     She discontinued Mounjaro two weeks prior to her last visit due to back pain. Initially, the pain improved but has since returned. She attributes this to aging. She has been dealing with mid-thoracic back pain for several years, which is exacerbated by bending over. The pain is bilateral but more pronounced on the left side today. Despite chiropractic treatment and x-rays showing no abnormalities, her pain persists. She has been attempting to manage the pain through stretching and strengthening exercises.    She underwent a colonoscopy in June 2024, which showed no abnormalities. She had an eye examination a few weeks ago.    She reports no chest pain, breathing difficulties, fevers, or chills in recent days.    She has osteoporosis and has developed tenderness in her arm, which is improving.    SOCIAL HISTORY  She is a teacher.    FAMILY HISTORY  She has a family history of heart disease, heart attacks, and strokes. She denies any family history of diabetes.    Health Maintenance: Completed    Objective:     Exam:  /68 (BP Location: Left arm, Patient Position: Sitting, BP Cuff Size: Adult)   Pulse 74   Temp 36.4 °C (97.6 °F) (Temporal)   Resp 16   Ht 1.676 m (5' 6\")   Wt 75.8 kg (167 lb)   SpO2 94%   BMI 26.95 kg/m²  Body mass index is 26.95 kg/m².    Physical Exam  Constitutional:       Appearance: Normal appearance.   Cardiovascular:      Rate and Rhythm: Normal rate and regular rhythm.      Heart sounds: Normal heart " sounds.   Pulmonary:      Effort: Pulmonary effort is normal.      Breath sounds: Normal breath sounds.   Musculoskeletal:      Cervical back: Normal range of motion and neck supple.   Lymphadenopathy:      Cervical: No cervical adenopathy.   Neurological:      Mental Status: She is alert.             Results  Laboratory Studies  A1c is 8.1%.    Imaging  X-ray of the back shows no abnormalities.    Assessment & Plan:     1. Type 2 diabetes mellitus with hyperglycemia, without long-term current use of insulin ([facility])  POCT Hemoglobin A1C      2. Chronic bilateral thoracic back pain            Assessment & Plan  1. Diabetes Mellitus.  Chronic, uncontrolled. Her A1c level has increased to 8.1 percent, despite the addition of Jardiance to her treatment regimen. The discontinuation of Mounjaro two weeks prior to her last visit may have contributed to this increase. It was clarified that her back pain is not a side effect of either Mounjaro or Trulicity. The plan is to discontinue Jardiance and return to Mounjaro, which should be more effective in lowering her A1c levels. She will continue with metformin 500 mg twice a day. A prescription for Mounjaro 2.5 mg once a week for a month will be provided. After a month, the dosage will be increased to 5 mg, and a two-month supply will be prescribed. Her A1c levels will be rechecked in three months.    2. Mid-thoracic back pain.  Her x-ray results are normal. The chiropractic treatment does not appear to be significantly beneficial. The pain seems to be muscular in nature. A handout detailing upper back exercises to alleviate the pain will be provided. If the exercises do not provide relief, a referral to physical therapy will be considered.        Return in about 3 months (around 11/21/2024) for f/u DM, get A1c.    Please note that this dictation was created using voice recognition software. I have made every reasonable attempt to correct obvious errors, but I expect that there  are errors of grammar and possibly content that I did not discover before finalizing the note.

## 2024-08-28 NOTE — TELEPHONE ENCOUNTER
Was the patient seen in the last year in this department? Yes     Does patient have an active prescription for medications requested? No     Received Request Via: Patient     Hi Dr. De Guzman- Been trying to get my Metformin refilled this week. Pharmacy says that they sent it to your office at the beginning of the week. Not too sure what the hold up is. I am now down to one pill.     Thanks, Christa Ramirez    yes

## 2024-09-07 ENCOUNTER — OFFICE VISIT (OUTPATIENT)
Dept: URGENT CARE | Facility: CLINIC | Age: 69
End: 2024-09-07
Payer: COMMERCIAL

## 2024-09-07 VITALS
RESPIRATION RATE: 14 BRPM | SYSTOLIC BLOOD PRESSURE: 110 MMHG | HEART RATE: 78 BPM | DIASTOLIC BLOOD PRESSURE: 78 MMHG | BODY MASS INDEX: 25.81 KG/M2 | HEIGHT: 66 IN | TEMPERATURE: 96.3 F | OXYGEN SATURATION: 94 % | WEIGHT: 160.6 LBS

## 2024-09-07 DIAGNOSIS — R19.7 DIARRHEA, UNSPECIFIED TYPE: ICD-10-CM

## 2024-09-07 PROCEDURE — 99214 OFFICE O/P EST MOD 30 MIN: CPT | Performed by: FAMILY MEDICINE

## 2024-09-07 PROCEDURE — 3074F SYST BP LT 130 MM HG: CPT | Performed by: FAMILY MEDICINE

## 2024-09-07 PROCEDURE — 3078F DIAST BP <80 MM HG: CPT | Performed by: FAMILY MEDICINE

## 2024-09-07 ASSESSMENT — FIBROSIS 4 INDEX: FIB4 SCORE: 1.22

## 2024-09-07 NOTE — PROGRESS NOTES
"  Subjective:      69 y.o. female presents to urgent care for diarrhea that started on Monday.  There is no blood in her stool.  No associated abdominal pain, nausea, or vomiting. No changes to urinary urgency, frequency, dysuria, or hematuria.  No recent travel, antibiotic use, change in diet, or exposure to exotic animals.  No prior history of abdominal surgery.    She denies any other questions or concerns at this time.    Current problem list, medication, and past medical/surgical history were reviewed in Epic.    ROS  See HPI     Objective:      /78 (BP Location: Left arm, Patient Position: Sitting, BP Cuff Size: Adult)   Pulse 78   Temp (!) 35.7 °C (96.3 °F) (Temporal)   Resp 14   Ht 1.676 m (5' 6\")   Wt 72.8 kg (160 lb 9.6 oz)   SpO2 94%   BMI 25.92 kg/m²     Physical Exam  Constitutional:       General: She is not in acute distress.     Appearance: She is not diaphoretic.   Cardiovascular:      Rate and Rhythm: Normal rate and regular rhythm.      Heart sounds: Normal heart sounds.   Pulmonary:      Effort: Pulmonary effort is normal. No respiratory distress.      Breath sounds: Normal breath sounds.   Abdominal:      General: Bowel sounds are normal.      Palpations: Abdomen is soft.      Tenderness: There is no abdominal tenderness.   Neurological:      Mental Status: She is alert.   Psychiatric:         Mood and Affect: Affect normal.         Judgment: Judgment normal.       Assessment/Plan:     1. Diarrhea, unspecified type  Patient has a history of intestinal bacteria that required antibiotic treatment.  Full stool study has been ordered.  Will treat appropriately once resulted.  - CRYPTO/GIARDIA RAPID ASSAY; Future  - Cdiff By PCR Rflx Toxin; Future  - CULTURE STOOL; Future      Instructed to return to Urgent Care or nearest Emergency Department if symptoms fail to improve, for any change in condition, further concerns, or new concerning symptoms. Patient states understanding of the plan " of care and discharge instructions.    Monet Bello M.D.

## 2024-09-30 RX ORDER — LISINOPRIL 10 MG/1
10 TABLET ORAL DAILY
Qty: 90 TABLET | Refills: 0 | Status: SHIPPED | OUTPATIENT
Start: 2024-09-30

## 2024-09-30 RX ORDER — METFORMIN HCL 500 MG
TABLET, EXTENDED RELEASE 24 HR ORAL
Qty: 360 TABLET | Refills: 0 | Status: SHIPPED | OUTPATIENT
Start: 2024-09-30

## 2024-09-30 NOTE — TELEPHONE ENCOUNTER
Received request via: Pharmacy    Was the patient seen in the last year in this department? Yes    Does the patient have an active prescription (recently filled or refills available) for medication(s) requested? No    Pharmacy Name:     Does the patient have [facility] Plus and need 100-day supply? (This applies to ALL medications) Patient does not have SCP

## 2024-12-09 ENCOUNTER — OFFICE VISIT (OUTPATIENT)
Dept: MEDICAL GROUP | Facility: PHYSICIAN GROUP | Age: 69
End: 2024-12-09
Payer: COMMERCIAL

## 2024-12-09 VITALS
SYSTOLIC BLOOD PRESSURE: 112 MMHG | BODY MASS INDEX: 24.75 KG/M2 | TEMPERATURE: 97.8 F | OXYGEN SATURATION: 94 % | HEART RATE: 85 BPM | HEIGHT: 66 IN | DIASTOLIC BLOOD PRESSURE: 68 MMHG | WEIGHT: 154 LBS

## 2024-12-09 DIAGNOSIS — E06.3 HYPOTHYROIDISM DUE TO HASHIMOTO THYROIDITIS: ICD-10-CM

## 2024-12-09 DIAGNOSIS — E11.22 TYPE 2 DIABETES MELLITUS WITH STAGE 2 CHRONIC KIDNEY DISEASE, WITHOUT LONG-TERM CURRENT USE OF INSULIN (HCC): Primary | ICD-10-CM

## 2024-12-09 DIAGNOSIS — N18.2 TYPE 2 DIABETES MELLITUS WITH STAGE 2 CHRONIC KIDNEY DISEASE, WITHOUT LONG-TERM CURRENT USE OF INSULIN (HCC): Primary | ICD-10-CM

## 2024-12-09 DIAGNOSIS — Z76.89 ENCOUNTER FOR WEIGHT MANAGEMENT: ICD-10-CM

## 2024-12-09 LAB
HBA1C MFR BLD: 6.6 % (ref ?–5.8)
POCT INT CON NEG: NEGATIVE
POCT INT CON POS: POSITIVE

## 2024-12-09 PROCEDURE — 3074F SYST BP LT 130 MM HG: CPT | Performed by: FAMILY MEDICINE

## 2024-12-09 PROCEDURE — 83036 HEMOGLOBIN GLYCOSYLATED A1C: CPT | Performed by: FAMILY MEDICINE

## 2024-12-09 PROCEDURE — 99214 OFFICE O/P EST MOD 30 MIN: CPT | Performed by: FAMILY MEDICINE

## 2024-12-09 PROCEDURE — 3078F DIAST BP <80 MM HG: CPT | Performed by: FAMILY MEDICINE

## 2024-12-09 ASSESSMENT — FIBROSIS 4 INDEX: FIB4 SCORE: 1.22

## 2024-12-10 NOTE — PROGRESS NOTES
Verbal consent was acquired by the patient to use [employer] ambient listening note generation during this visit     Subjective:     HPI:   History of Present Illness  The patient is here today to follow up on diabetes. At a previous appointment, she discontinued Mounjaro as she thought it might be contributing to her back pain, but she determined that the back pain did not resolve after stopping the Mounjaro. At her last appointment her A1c was 8.1%, so we discontinued her Jardiance and restarted Mounjaro.     Diabetes Management  She reports a significant improvement in her condition following the resumption of Mounjaro, with her A1c levels decreasing from 8.1% to 6.6%. She has also experienced a weight loss of 10 pounds, which she attributes to the medication. She expresses concern about potential excessive weight loss due to the continued use of Mounjaro. 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 She has found that the weight loss has alleviated some of the pressure on her knees. She has lost a total of 65 pounds since 2011 when she weighed 210 pounds. She also incorporates the use of an elliptical bike into her exercise routine. She is also taking metformin 1000 mg twice daily. She reports no recent episodes of chest pain, respiratory distress, fever, or chills.  - Onset: Improvement noted after resuming Mounjaro.  - Duration: Since resumption of Mounjaro.  - Character: Decrease in A1c levels, weight loss.  - Alleviating/Aggravating Factors: Active lifestyle, use of Mounjaro, metformin, and Prolia.  - Severity: Significant improvement with A1c levels decreasing from 8.1% to 6.6%, weight loss of 65 pounds.    Supplemental Information  She is on Prolia. She is going to have knee replacements done, but she is waiting until she joints Medicare and they can pay for it.    MEDICATIONS  Current:  "Metformin, Mounjaro, Prolia.  Discontinued: Jardiance.    IMMUNIZATIONS  She has had her COVID-19 and influenza vaccines on 11/11/2023. She has received 2 doses of the pneumonia vaccine.    Health Maintenance: Completed    Objective:     Exam:  /68 (BP Location: Left arm, Patient Position: Sitting, BP Cuff Size: Adult)   Pulse 85   Temp 36.6 °C (97.8 °F) (Temporal)   Ht 1.676 m (5' 6\")   Wt 69.9 kg (154 lb)   SpO2 94%   BMI 24.86 kg/m²  Body mass index is 24.86 kg/m².    Physical Exam  Constitutional:       Appearance: Normal appearance.   Cardiovascular:      Rate and Rhythm: Normal rate and regular rhythm.      Heart sounds: Normal heart sounds.   Pulmonary:      Effort: Pulmonary effort is normal.      Breath sounds: Normal breath sounds.   Musculoskeletal:      Cervical back: Normal range of motion and neck supple.   Lymphadenopathy:      Cervical: No cervical adenopathy.   Neurological:      Mental Status: She is alert.             Results  Laboratory Studies  A1c is 6.6%.    Assessment & Plan:     1. Type 2 diabetes mellitus with stage 2 chronic kidney disease, without long-term current use of insulin ([facility])  POCT A1C    Microalbumin Creat Ratio Urine (Clinic Collect)    Lipid Profile    CBC WITHOUT DIFFERENTIAL    CANCELED: Microalbumin Creat Ratio Urine (Clinic Collect)      2. Encounter for weight management        3. Hypothyroidism due to Hashimoto thyroiditis  TSH WITH REFLEX TO FT4          Assessment & Plan  1. Diabetes mellitus - Chronic.  A1c improved to 6.6% from 8.1%.  - Continue Mounjaro 5 mg every 7 days and metformin 1000 mg twice daily  - Maintain a healthy diet rich in protein  - Incorporate strength training exercises twice weekly  - Blood work with microalbumin to creatinine ratio ordered to be completed at the lab  - Consider reducing metformin dosage if A1c decreases below 6.5%    2 weight management - Chronic. She has lost 7 lbs since August. BMI is now under 25.   - continue " healthy lifestyle    3. Health maintenance - Received COVID-19 and influenza vaccines on 11/11/2023, no further pneumonia vaccine needed, consider RSV vaccine due to diabetes and slight decrease in kidney function.      Return in about 3 months (around 3/9/2025) for f/u DM, get A1c.    Please note that this dictation was created using voice recognition software. I have made every reasonable attempt to correct obvious errors, but I expect that there are errors of grammar and possibly content that I did not discover before finalizing the note.

## 2024-12-16 ENCOUNTER — HOSPITAL ENCOUNTER (OUTPATIENT)
Dept: LAB | Facility: MEDICAL CENTER | Age: 69
End: 2024-12-16
Attending: FAMILY MEDICINE
Payer: COMMERCIAL

## 2024-12-16 DIAGNOSIS — N18.2 TYPE 2 DIABETES MELLITUS WITH STAGE 2 CHRONIC KIDNEY DISEASE, WITHOUT LONG-TERM CURRENT USE OF INSULIN (HCC): ICD-10-CM

## 2024-12-16 DIAGNOSIS — E06.3 HYPOTHYROIDISM DUE TO HASHIMOTO THYROIDITIS: ICD-10-CM

## 2024-12-16 DIAGNOSIS — E11.22 TYPE 2 DIABETES MELLITUS WITH STAGE 2 CHRONIC KIDNEY DISEASE, WITHOUT LONG-TERM CURRENT USE OF INSULIN (HCC): ICD-10-CM

## 2024-12-16 LAB
CHOLEST SERPL-MCNC: 146 MG/DL (ref 100–199)
ERYTHROCYTE [DISTWIDTH] IN BLOOD BY AUTOMATED COUNT: 51.8 FL (ref 35.9–50)
FASTING STATUS PATIENT QL REPORTED: NORMAL
HCT VFR BLD AUTO: 39.8 % (ref 37–47)
HDLC SERPL-MCNC: 47 MG/DL
HGB BLD-MCNC: 12.6 G/DL (ref 12–16)
LDLC SERPL CALC-MCNC: 81 MG/DL
MCH RBC QN AUTO: 27.5 PG (ref 27–33)
MCHC RBC AUTO-ENTMCNC: 31.7 G/DL (ref 32.2–35.5)
MCV RBC AUTO: 86.7 FL (ref 81.4–97.8)
PLATELET # BLD AUTO: 411 K/UL (ref 164–446)
PMV BLD AUTO: 9 FL (ref 9–12.9)
RBC # BLD AUTO: 4.59 M/UL (ref 4.2–5.4)
TRIGL SERPL-MCNC: 88 MG/DL (ref 0–149)
TSH SERPL DL<=0.005 MIU/L-ACNC: 0.6 UIU/ML (ref 0.38–5.33)
WBC # BLD AUTO: 7.4 K/UL (ref 4.8–10.8)

## 2024-12-16 PROCEDURE — 82570 ASSAY OF URINE CREATININE: CPT

## 2024-12-16 PROCEDURE — 80061 LIPID PANEL: CPT

## 2024-12-16 PROCEDURE — 84443 ASSAY THYROID STIM HORMONE: CPT

## 2024-12-16 PROCEDURE — 85027 COMPLETE CBC AUTOMATED: CPT

## 2024-12-16 PROCEDURE — 36415 COLL VENOUS BLD VENIPUNCTURE: CPT

## 2024-12-16 PROCEDURE — 82043 UR ALBUMIN QUANTITATIVE: CPT

## 2024-12-17 LAB
CREAT UR-MCNC: 152.76 MG/DL
MICROALBUMIN UR-MCNC: 15.8 MG/DL
MICROALBUMIN/CREAT UR: 103 MG/G (ref 0–30)

## 2024-12-30 RX ORDER — LISINOPRIL 10 MG/1
10 TABLET ORAL DAILY
Qty: 90 TABLET | Refills: 3 | Status: SHIPPED | OUTPATIENT
Start: 2024-12-30

## 2024-12-30 RX ORDER — TIRZEPATIDE 5 MG/.5ML
INJECTION, SOLUTION SUBCUTANEOUS
Qty: 6 ML | Refills: 0 | Status: SHIPPED | OUTPATIENT
Start: 2024-12-30

## 2025-01-30 ENCOUNTER — HOSPITAL ENCOUNTER (OUTPATIENT)
Dept: RADIOLOGY | Facility: MEDICAL CENTER | Age: 70
End: 2025-01-30
Attending: FAMILY MEDICINE
Payer: COMMERCIAL

## 2025-01-30 DIAGNOSIS — Z12.31 VISIT FOR SCREENING MAMMOGRAM: ICD-10-CM

## 2025-01-30 PROCEDURE — 77067 SCR MAMMO BI INCL CAD: CPT

## 2025-03-03 RX ORDER — TIRZEPATIDE 5 MG/.5ML
1 INJECTION, SOLUTION SUBCUTANEOUS
Qty: 8 ML | Refills: 0 | Status: SHIPPED | OUTPATIENT
Start: 2025-03-03 | End: 2025-03-31 | Stop reason: SDUPTHER

## 2025-03-03 NOTE — TELEPHONE ENCOUNTER
Chief Complaint   Patient presents with   • Follow-up     labs    • Medication Refill     pt needs a new glucose meter      Patient is here for follow up on multiple medical problems.    1. Diabetes mellitus, type 2  2. Chronic kidney disease, stage 2  3. Hypertension  4. Dyslipidemia   5. Gastroesophageal reflux disease  6. Obstructive sleep apnea on CPAP  7. Body mass index 30.0-30.9  8. Colon cancer screening  9. Erectile dysfunction      HISTORY OF PRESENT ILLNESS:  The patient is a 59 year old male who is here today for follow up of multiple medical problems:    1. Diabetes mellitus type 2:  Patient's blood sugar was   Glucose   Date Value Ref Range Status   06/06/2018 126 (H) 65 - 99 mg/dL Final   02/22/2018 128 (H) 65 - 99 mg/dL Final    and hemoglobin A1c was   Hemoglobin A1C   Date Value Ref Range Status   06/06/2018 5.7 (H) 4.5 - 5.6 % Final     Comment:        ----DIABETIC SCREENING---  NON DIABETIC                 <5.7%  INCREASED RISK                5.7-6.4%  DIAGNOSTIC FOR DIABETES      >6.4%     ----DIABETIC CONTROL---     A1C%           eAG mg/dL  6.0            126  6.5            140  7.0            154  7.5            169  8.0            183  8.5            197  9.0            212  9.5            226  10.0           240     02/22/2018 6.1 (H) 4.5 - 5.6 % Final     Comment:        ----DIABETIC SCREENING---  NON DIABETIC                 <5.7%  INCREASED RISK                5.7-6.4%  DIAGNOSTIC FOR DIABETES      >6.4%     ----DIABETIC CONTROL---     A1C%           eAG mg/dL  6.0            126  6.5            140  7.0            154  7.5            169  8.0            183  8.5            197  9.0            212  9.5            226  10.0           240     .  No polyuria, polydipsia, or polyphagia. No hypoglycemic symptoms.  Patient is tolerating medications well.  Medications reviewed in Smart Chart.  Discussed with patient annual diabetic eye exam.    2. Chronic kidney disease, stage 2:  Patient is  Received request via: Patient    Was the patient seen in the last year in this department? Yes    Does the patient have an active prescription (recently filled or refills available) for medication(s) requested? No    Pharmacy Name: [facility] [address] [place] [address], [place] - [id number]     Does the patient have FPC Plus and need 100-day supply? (This applies to ALL medications) Patient does not have SCP   clinically stable, denies any dysuria or hematuria, no flank pain.  Patient is currently not taking any nonsteroidal anti-inflammatory drugs on regular basis.  Most recent lab work shows   Creatinine   Date Value Ref Range Status   06/06/2018 1.08 0.67 - 1.17 mg/dL Final   02/22/2018 1.05 0.67 - 1.17 mg/dL Final   ,   BUN   Date Value Ref Range Status   06/06/2018 19 6 - 20 mg/dL Final   02/22/2018 17 6 - 20 mg/dL Final   ,   GFR Estimate, Non    Date Value Ref Range Status   06/06/2018 75  Final     Comment:     eGFR 60 - 89 mL/min/1.73m2 = Mild decrease in kidney function.   02/22/2018 78  Final     Comment:     eGFR 60 - 89 mL/min/1.73m2 = Mild decrease in kidney function.       GFR Estimate,    Date Value Ref Range Status   06/06/2018 87  Final     Comment:     eGFR 60 - 89 mL/min/1.73m2 = Mild decrease in kidney function.   02/22/2018 90  Final     Comment:     eGFR results = or >90 mL/min/1.73m2 = Normal kidney function.      Potassium   Date Value Ref Range Status   06/06/2018 3.9 3.4 - 5.1 mmol/L Final   02/22/2018 3.6 3.4 - 5.1 mmol/L Final   ,   Sodium   Date Value Ref Range Status   06/06/2018 141 135 - 145 mmol/L Final   02/22/2018 138 135 - 145 mmol/L Final   ,   MICROALBUMIN/CREATININE   Date Value Ref Range Status   04/28/2017 8.8 <30 mcg/mg Final   09/19/2016 7.6 <30 mcg/mg Final    and were discussed with the patient.  Medications were reviewed in Smart Chart.    3. Hypertension:  Blood pressure today is 120/70.  Medications are reviewed in Smart Chart.  Patient is tolerating medications well.  Patient denies any chest pain, palpitations or shortness of breath.  No dizziness, syncope or near syncope symptoms. No TIA (transient ischemic attack) or CVA (cerebrovascular accident)-like symptoms. Most recent lab work shows   Creatinine   Date Value Ref Range Status   06/06/2018 1.08 0.67 - 1.17 mg/dL Final   02/22/2018 1.05 0.67 - 1.17 mg/dL Final   ,   BUN   Date Value  Ref Range Status   06/06/2018 19 6 - 20 mg/dL Final   02/22/2018 17 6 - 20 mg/dL Final   ,   Sodium   Date Value Ref Range Status   06/06/2018 141 135 - 145 mmol/L Final   02/22/2018 138 135 - 145 mmol/L Final   ,   Potassium   Date Value Ref Range Status   06/06/2018 3.9 3.4 - 5.1 mmol/L Final   02/22/2018 3.6 3.4 - 5.1 mmol/L Final   .  The rest of lab work was reviewed per Smart Chart.    4. Dyslipidemia:  Patient's most recent lipid panel shows   CHOLESTEROL   Date Value Ref Range Status   06/06/2018 104 <200 mg/dL Final     Comment:        Desirable            <200  Borderline High      200 to 239  High                 >=240        02/22/2018 111 <200 mg/dL Final     Comment:        Desirable            <200  Borderline High      200 to 239  High                 >=240           CALCULATED LDL   Date Value Ref Range Status   06/06/2018 46 <130 mg/dL Final     Comment:     OPTIMAL               <100  NEAR OPTIMAL          100-129  BORDERLINE HIGH       130-159  HIGH                  160-189  VERY HIGH             >=190     02/22/2018 53 <130 mg/dL Final     Comment:     OPTIMAL               <100  NEAR OPTIMAL          100-129  BORDERLINE HIGH       130-159  HIGH                  160-189  VERY HIGH             >=190     ,   HDL   Date Value Ref Range Status   06/06/2018 31 (L) >39 mg/dL Final     Comment:     Low            <40  Borderline Low 40 to 49  Near Optimal   50 to 59  Optimal        >=60     02/22/2018 33 (L) >39 mg/dL Final     Comment:     Low            <40  Borderline Low 40 to 49  Near Optimal   50 to 59  Optimal        >=60     ,   TRIGLYCERIDE   Date Value Ref Range Status   06/06/2018 134 <150 mg/dL Final     Comment:     Normal                   <150  Borderline High          150 to 199  High                     200 to 499  Very High                >=500     02/22/2018 123 <150 mg/dL Final     Comment:     Normal                   <150  Borderline High          150 to 199  High                      200 to 499  Very High                >=500     ,   CHOL/HDL   Date Value Ref Range Status   06/06/2018 3.4 <4.5 Final   02/22/2018 3.4 <4.5 Final   ,   ALT/SGPT   Date Value Ref Range Status   06/06/2018 45 <79 Units/L Final   02/22/2018 54 <79 Units/L Final   ,   AST/SGOT   Date Value Ref Range Status   06/06/2018 31 <38 Units/L Final   02/22/2018 34 <38 Units/L Final    were reviewed and discussed with patient.  Medications reviewed in Smart Chart, patient is tolerating medications well.  Patient is trying to watch the diet.  No abdominal pain or jaundice.  No muscle aches or pains.    5. Gastroesophageal reflux disease:  Patient is doing generally well, denies any recent flare-up of acid reflux.  No abdominal pain, no melena, no hematochezia, no hematemesis or hemoptysis.  Patient is tolerating medications well.  This was reviewed in Smart Chart.  Patient is currently not taking any nonsteroidal anti-inflammatory drugs on a regular basis.    6. Obstructive sleep apnea on CPAP: Patient is currently on CPAP and is tolerating CPAP well. The patient has not had any problems, has no specific concerns or complaints. Patient is doing better on CPAP.    7. Body mass index 30.0-30.9: Patient's weight and BMI were noted in the Smart Chart.  Patient has been working on the diet but admits that more can be done with diet control and exercise. Most recent lab work was reviewed in Epic.  No heat or cold intolerance.    8. Colon cancer screening: Patient is due for a colonoscopy. Patient had a colonoscopy in 2013, and polyps were removed. He was counseled on the importance of colonoscopy, and is agreeable to scheduling one at this time.    9. Erectile dysfunction: Patient states he has had difficulty maintaining an erection for the past year. He has never tried medication in this regards, but would to try something at this time. He does not take nitroglycerin, no chest pain.      Lab Services on 06/06/2018   Component Date  Value Ref Range Status   • WBC 06/06/2018 5.7  4.2 - 11.0 K/mcL Final   • RBC 06/06/2018 4.63  4.50 - 5.90 mil/mcL Final   • HGB 06/06/2018 15.4  13.0 - 17.0 g/dL Final   • HCT 06/06/2018 42.5  39.0 - 51.0 % Final   • MCV 06/06/2018 91.8  78.0 - 100.0 fl Final   • MCH 06/06/2018 33.3  26.0 - 34.0 pg Final   • MCHC 06/06/2018 36.2  32.0 - 36.5 g/dL Final   • RDW-CV 06/06/2018 12.9  11.0 - 15.0 % Final   • PLT 06/06/2018 166  140 - 450 K/mcL Final   • DIFF TYPE 06/06/2018 AUTOMATED DIFFERENTIAL   Final   • Neutrophil 06/06/2018 65  % Final   • LYMPH 06/06/2018 22  % Final   • MONO 06/06/2018 10  % Final   • EOSIN 06/06/2018 3  % Final   • BASO 06/06/2018 0  % Final   • Absolute Neutrophil 06/06/2018 3.7  1.8 - 7.7 K/mcL Final   • Absolute Lymph 06/06/2018 1.2  1.0 - 4.0 K/mcL Final   • Absolute Mono 06/06/2018 0.6  0.3 - 0.9 K/mcL Final   • Absolute Eos 06/06/2018 0.2  0.1 - 0.5 K/mcL Final   • Absolute Baso 06/06/2018 0.0  0.0 - 0.3 K/mcL Final   • Fasting Status 06/06/2018 12  hrs Final   • Sodium 06/06/2018 141  135 - 145 mmol/L Final   • Potassium 06/06/2018 3.9  3.4 - 5.1 mmol/L Final   • Chloride 06/06/2018 102  98 - 107 mmol/L Final   • Carbon Dioxide 06/06/2018 31  21 - 32 mmol/L Final   • Anion Gap 06/06/2018 12  10 - 20 mmol/L Final   • Glucose 06/06/2018 126* 65 - 99 mg/dL Final   • BUN 06/06/2018 19  6 - 20 mg/dL Final   • Creatinine 06/06/2018 1.08  0.67 - 1.17 mg/dL Final   • GFR Estimate,  06/06/2018 87   Final    eGFR 60 - 89 mL/min/1.73m2 = Mild decrease in kidney function.   • GFR Estimate, Non  06/06/2018 75   Final    eGFR 60 - 89 mL/min/1.73m2 = Mild decrease in kidney function.   • BUN/Creatinine Ratio 06/06/2018 18  7 - 25 Final   • CALCIUM 06/06/2018 9.5  8.4 - 10.2 mg/dL Final   • TOTAL BILIRUBIN 06/06/2018 1.6* 0.2 - 1.0 mg/dL Final   • AST/SGOT 06/06/2018 31  <38 Units/L Final   • ALT/SGPT 06/06/2018 45  <79 Units/L Final   • ALK PHOSPHATASE 06/06/2018 70  45  - 117 Units/L Final   • TOTAL PROTEIN 06/06/2018 7.1  6.4 - 8.2 g/dL Final   • Albumin 06/06/2018 4.1  3.6 - 5.1 g/dL Final   • GLOBULIN 06/06/2018 3.0  2.0 - 4.0 g/dL Final   • A/G Ratio, Serum 06/06/2018 1.4  1.0 - 2.4 Final   • Hemoglobin A1C 06/06/2018 5.7* 4.5 - 5.6 % Final    Comment:    ----DIABETIC SCREENING---  NON DIABETIC                 <5.7%  INCREASED RISK                5.7-6.4%  DIAGNOSTIC FOR DIABETES      >6.4%     ----DIABETIC CONTROL---     A1C%           eAG mg/dL  6.0            126  6.5            140  7.0            154  7.5            169  8.0            183  8.5            197  9.0            212  9.5            226  10.0           240     • COLOR 06/06/2018 YELLOW  YELLOW Final   • APPEARANCE 06/06/2018 CLEAR   Final   • GLUCOSE(URINE) 06/06/2018 NEGATIVE  NEGATIVE mg/dL Final   • BILIRUBIN 06/06/2018 NEGATIVE  NEGATIVE Final   • KETONES 06/06/2018 NEGATIVE  NEGATIVE mg/dL Final   • SPECIFIC GRAVITY 06/06/2018 1.014  1.005 - 1.030 Final   • BLOOD 06/06/2018 NEGATIVE  NEGATIVE Final   • pH 06/06/2018 6.5  5.0 - 7.0 Units Final   • PROTEIN(URINE) 06/06/2018 NEGATIVE  NEGATIVE mg/dL Final   • UROBILINOGEN 06/06/2018 0.2  0.0 - 1.0 mg/dL Final   • NITRITE 06/06/2018 NEGATIVE  NEGATIVE Final   • LEUKOCYTE ESTERASE 06/06/2018 NEGATIVE  NEGATIVE Final   • Squamous EPI'S 06/06/2018 NONE SEEN  0 - 5 /hpf Final   • RBC 06/06/2018 1 to 3  0 - 3 /hpf Final   • WBC 06/06/2018 NONE SEEN  0 - 5 /hpf Final   • BACTERIA 06/06/2018 NONE SEEN  NONE SEEN /hpf Final   • Hyaline Casts 06/06/2018 NONE SEEN  0 - 5 /lpf Final   • SPECIMEN TYPE 06/06/2018 URINE, CLEAN CATCH/MIDSTREAM   Final   • FASTING STATUS 06/06/2018 12  hrs Final   • CHOLESTEROL 06/06/2018 104  <200 mg/dL Final    Comment:    Desirable            <200  Borderline High      200 to 239  High                 >=240        • CALCULATED LDL 06/06/2018 46  <130 mg/dL Final    Comment: OPTIMAL               <100  NEAR OPTIMAL           100-129  BORDERLINE HIGH       130-159  HIGH                  160-189  VERY HIGH             >=190     • HDL 06/06/2018 31* >39 mg/dL Final    Comment: Low            <40  Borderline Low 40 to 49  Near Optimal   50 to 59  Optimal        >=60     • TRIGLYCERIDE 06/06/2018 134  <150 mg/dL Final    Comment: Normal                   <150  Borderline High          150 to 199  High                     200 to 499  Very High                >=500     • CALCULATED NON HDL 06/06/2018 73  mg/dL Final    Comment:    Therapeutic Target:  CHD and risk equivalents <130  Multiple risk factors    <160  0 to 1 risk factors      <190        • CHOL/HDL 06/06/2018 3.4  <4.5 Final       No results found for this or any previous visit.    REVIEW OF SYSTEM:  All systems are reviewed and are essentially negative except for as per history of present illness.    PAST MEDICAL HISTORY:  Past Medical History:   Diagnosis Date   • Acute bronchitis    • Bronchitis    • CKD (chronic kidney disease) stage 2, GFR 60-89 ml/min 6/20/2016   • Dyslipidemia    • Esophageal reflux    • Gallbladder attack    • Gallstones    • GERD (gastroesophageal reflux disease)    • Gilbert's syndrome    • Hearing impairment    • HTN (hypertension)    • Liver disease     gilberts syndrome   • Malignant neoplasm (CMS/HCC)     basal skin   • Other and unspecified hyperlipidemia    • Sleep apnea     uses CPAP   • Type 2 diabetes mellitus (CMS/HCC)    • Unspecified sinusitis (chronic)        PAST SURGICAL HISTORY:  Past Surgical History:   Procedure Laterality Date   • Appendectomy     • Removal gallbladder         SOCIAL HISTORY:  Social History     Social History   • Marital status:      Spouse name: N/A   • Number of children: N/A   • Years of education: N/A     Occupational History   • Not on file.     Social History Main Topics   • Smoking status: Never Smoker   • Smokeless tobacco: Never Used   • Alcohol use 0.0 oz/week      Comment: 1/mth   • Drug use: No   •  Sexual activity: Yes     Partners: Female      Comment:      Other Topics Concern   • Not on file     Social History Narrative   • No narrative on file       FAMILY HISTORY:   History reviewed. No pertinent family history.    Past medical history, past surgical history, social history and family history were reviewed per my note for the visit of April 2, 2014.     Current Outpatient Prescriptions   Medication Sig Dispense Refill   • blood glucose test strip TEST BLOOD SUGARS DAILY 50 each 2   • glimepiride (AMARYL) 4 MG tablet Take 1 tablet by mouth daily (before breakfast). 30 tablet 3   • metFORMIN (GLUCOPHAGE) 850 MG tablet Take 1 tablet by mouth 3 times daily (with meals). 90 tablet 3   • losartan-hydrochlorothiazide (HYZAAR) 100-25 MG per tablet Take 1 tablet by mouth daily. 30 tablet 3   • omeprazole (PRILOSEC) 40 MG capsule Take 1 capsule by mouth daily. 30 capsule 3   • pravastatin (PRAVACHOL) 20 MG tablet Take 1 tablet by mouth daily. 30 tablet 3   • ONETOUCH DELICA LANCETS 33G Misc TEST BLOOD SUGARS DAILY 100 each 1   • Saw Palmetto 160 MG Cap Take by mouth 2 times daily.     • DISPENSE Life scan One Touch glucometer 1 each 0   • albuterol (PROAIR HFA) 108 (90 BASE) MCG/ACT inhaler Inhale 2 puffs into the lungs every 4 hours as needed for Shortness of Breath, Wheezing or Other (cough). 1 Inhaler 0   • Omega 3 1000 MG capsule Take 1,000 mg by mouth.     • sildenafil (VIAGRA) 100 MG tablet Take 0.5 tablets by mouth as needed for Erectile Dysfunction. 3 tablet 3     No current facility-administered medications for this visit.        ALLERGIES:   Allergen Reactions   • Lisinopril Cough         PHYSICAL EXAMINATION:   GENERAL:  The patient is awake, alert, oriented x3, in no acute distress.   Blood pressure 120/70, pulse 60, temperature 97.9 °F (36.6 °C), temperature source Oral, resp. rate 17, height 5' 7.5\" (1.715 m), weight 90.9 kg, SpO2 96 %.  HEENT: Pupils equal, round, reactive to light and  accommodation. Extraocular muscles are intact. Sclerae anicteric. Conjunctivae clear. No significant redness or swelling of the eyelids were noted. Oropharynx is clear. No ulcers, no exudate, no erythema, no abscess formation. Tympanic membranes are clear and intact bilaterally. No frontal or maxillary sinus tenderness bilaterally. No mastoid tenderness bilaterally. External auditory canals are clear bilaterally. Airway is intact. Bilateral hearing aid in place.  NECK: Supple, no jugular venous distention. No submandibular, cervical or supraclavicular lymphadenopathy. No carotid bruits. Normal symmetric carotid upstrokes. No thyromegaly. No thyroid nodules. No palpable mass.    HEART: Regular rate and rhythm. S1 and S2 is normal. No S3, S4, no murmur, no rub.    LUNGS: Clear to auscultation bilaterally. No rales or rhonchi with good air entry bilaterally. No respiratory distress.    ABDOMEN: Soft, normoactive bowel sounds. No tenderness. No rebound tenderness. No palpable mass he does however have a reducible umbilical hernia no tenderness. No pulsatile mass. No hepatosplenomegaly. No inguinal lymphadenopathy. No CVA tenderness bilaterally.    EXTREMITIES: All 4 extremities with full range of movement. No edema no calf tenderness bilateral, posterior tibial 2+ and equal bilateral.    NEUROLOGICAL: Cranial nerves 2-12 are intact. Motor is 5/5 and equal in bilateral upper and lower extremities. Deep tendon reflexes 2+ and equal in bilateral upper and lower extremities and symmetric. Gait is steady. Sensation is intact. No acute motor or sensory deficits.    SKIN: Warm and dry. No rash. No petechiae.      ASSESSMENT:     Orders Placed This Encounter   • OPEN ACCESS COLONOSCOPY   • CBC & Auto Differential   • Comprehensive Metabolic Panel   • Glycohemoglobin   • Urinalysis with Micro & Culture if Indicated   • Lipid Panel with Reflex   • Thyroid Stimulating Hormone   • sildenafil (VIAGRA) 100 MG tablet         (E11.21)  Type 2 diabetes mellitus with diabetic nephropathy, without long-term current use of insulin (CMS/[facility])  (primary encounter diagnosis)  Plan: CBC & AUTO DIFFERENTIAL, COMPREHENSIVE         METABOLIC PANEL, GLYCOHEMOGLOBIN, URINALYSIS         WITH MICRO & CULTURE IF INDICATED, LIPID PANEL         WITH REFLEX, THYROID STIMULATING HORMONE  Patient is doing well on current regimen. Continue current management. Patient is to watch for any hypoglycemic symptoms. Advised patient with annual diabetic eye exam. Reevaluate in 3 months after lab work.     (I10) Benign essential hypertension  Plan: CBC & AUTO DIFFERENTIAL, COMPREHENSIVE         METABOLIC PANEL, URINALYSIS WITH MICRO &         CULTURE IF INDICATED, LIPID PANEL WITH REFLEX  Patient is clinically stable. Continue current management. Advised patient to monitor blood pressure. Reevaluate after lab work in 3 months.     (E11.22,  N18.2) CKD stage 2 due to type 2 diabetes mellitus (CMS/[facility])  Plan: CBC & AUTO DIFFERENTIAL, COMPREHENSIVE         METABOLIC PANEL, URINALYSIS WITH MICRO &         CULTURE IF INDICATED  Patient is clinically stable. Advised patient to avoid nephrotoxic medications, particularly nonsteroidal anti-inflammatory drugs and maintain hydration with oral fluids. Reevaluate in 3 months.     (E78.2) Mixed dyslipidemia  Plan: COMPREHENSIVE METABOLIC PANEL, LIPID PANEL WITH        REFLEX  Advised patient to continue to watch diet. Continue current management. Reevaluate in 3 months.     (K21.9) Gastroesophageal reflux disease, esophagitis presence not specified  Patient is clinically stable and asymptomatic. Continue current management. Reevaluate in 3 months.    (G47.33,  Z99.89) DAVID on CPAP  Patient is clinically stable and asymptomatic. Continue current management. Reevaluate in 3 months.    (Z68.30) Body mass index (BMI) of 30.0-30.9 in adult  Discussed with patient weight loss with diet and exercise, all patient questions were answered. Reevaluate  in 3 months.     (Z12.11) Colon cancer screening  Plan: OPEN ACCESS COLONOSCOPY  Patient was counseled on the importance of colonoscopy, and is agreeable to scheduling one at this time.    (N52.9) Erectile dysfunction, unspecified erectile dysfunction type  Plan: I am starting the patient on sildenafil (VIAGRA) 100 MG tablet, Take 0.5 tablets by mouth as needed for Erectile Dysfunction.  I have prescribed sildenafil. Instructed patient to take 1/2 of a 100mg pill at time. Advised patient not to take more than 50mg at a time. Advised patient not to drink alcohol or drive while on this medication.      On 6/12/2018, Kristyn WADE scribed the services personally performed by Geovanny Christy MD.     The documentation recorded by the scribe accurately and completely reflects the service(s) I personally performed and the decisions made by me.

## 2025-03-11 RX ORDER — HYDROCORTISONE 25 MG/G
CREAM TOPICAL
Qty: 28 G | Refills: 0 | Status: SHIPPED | OUTPATIENT
Start: 2025-03-11

## 2025-03-11 NOTE — TELEPHONE ENCOUNTER
Received request via: Pharmacy    Was the patient seen in the last year in this department? Yes    Does the patient have an active prescription (recently filled or refills available) for medication(s) requested? No    Pharmacy Name: [facility]    Does the patient have assisted Plus and need 100-day supply? (This applies to ALL medications) Patient does not have SCP

## 2025-03-31 ENCOUNTER — OFFICE VISIT (OUTPATIENT)
Dept: MEDICAL GROUP | Facility: PHYSICIAN GROUP | Age: 70
End: 2025-03-31
Payer: COMMERCIAL

## 2025-03-31 VITALS
HEIGHT: 66 IN | HEART RATE: 84 BPM | TEMPERATURE: 97.3 F | BODY MASS INDEX: 24.78 KG/M2 | WEIGHT: 154.2 LBS | OXYGEN SATURATION: 96 % | DIASTOLIC BLOOD PRESSURE: 72 MMHG | SYSTOLIC BLOOD PRESSURE: 118 MMHG

## 2025-03-31 DIAGNOSIS — S86.001A INJURY OF RIGHT ACHILLES TENDON, INITIAL ENCOUNTER: ICD-10-CM

## 2025-03-31 DIAGNOSIS — E11.29 TYPE 2 DIABETES MELLITUS WITH DIABETIC MICROALBUMINURIA, WITHOUT LONG-TERM CURRENT USE OF INSULIN (HCC): Primary | ICD-10-CM

## 2025-03-31 DIAGNOSIS — R80.9 TYPE 2 DIABETES MELLITUS WITH DIABETIC MICROALBUMINURIA, WITHOUT LONG-TERM CURRENT USE OF INSULIN (HCC): Primary | ICD-10-CM

## 2025-03-31 DIAGNOSIS — I10 PRIMARY HYPERTENSION: ICD-10-CM

## 2025-03-31 LAB
HBA1C MFR BLD: 6.5 % (ref ?–5.8)
POCT INT CON NEG: NEGATIVE
POCT INT CON POS: POSITIVE

## 2025-03-31 RX ORDER — TIRZEPATIDE 5 MG/.5ML
1 INJECTION, SOLUTION SUBCUTANEOUS
Qty: 2 ML | Refills: 5 | Status: SHIPPED | OUTPATIENT
Start: 2025-03-31

## 2025-03-31 ASSESSMENT — FIBROSIS 4 INDEX: FIB4 SCORE: 1.06

## 2025-03-31 ASSESSMENT — PATIENT HEALTH QUESTIONNAIRE - PHQ9: CLINICAL INTERPRETATION OF PHQ2 SCORE: 0

## 2025-03-31 NOTE — PROGRESS NOTES
"Verbal consent was acquired by the patient to use [employer] ambient listening note generation during this visit     Subjective:     HPI:   History of Present Illness  The patient presents for evaluation of diabetes, blood pressure management, and Achilles tendon injury.    Diabetes Management  - She has been managing her diabetes with Mounjaro 5 mg every 7 days and metformin 500 mg twice daily.  - Recently reduced metformin intake due to gastrointestinal discomfort, taking one dose in the morning and another in the evening.  - Reports that this adjusted regimen is more tolerable for her stomach.  - Consuming a significant amount of protein drinks and is curious if this could be contributing to her elevated protein levels.  - Seeking advice on potential dietary modifications to manage her condition.  - Requests a refill of Mounjaro for 1 month supply.  - Reports no recent episodes of chest pain, respiratory distress, fevers, or chills.    Achilles Tendon Injury  - Developed a large lump around her Achilles tendon on the right side, attributed to wearing a new pair of shoes that caused friction.  - The lump initially increased in size but has since decreased over a period of 2 months.  - Reports no associated pain.  - Recalls a similar incident in the past where the lump resolved completely, but notes that the current lump has not fully subsided.    Blood Pressure Management  - She is on chlorthalidone 25 mg once a day and lisinopril 10 mg a day.    MEDICATIONS  Current: Metformin, Mounjaro, chlorthalidone, lisinopril    Health Maintenance: Completed    Objective:     Exam:  /72 (BP Location: Left arm, Patient Position: Sitting, BP Cuff Size: Adult)   Pulse 84   Temp 36.3 °C (97.3 °F) (Temporal)   Ht 1.676 m (5' 6\")   Wt 69.9 kg (154 lb 3.2 oz)   SpO2 96%   BMI 24.89 kg/m²  Body mass index is 24.89 kg/m².    Physical Exam  Constitutional:       Appearance: Normal appearance.   Cardiovascular:      Rate " and Rhythm: Normal rate and regular rhythm.      Heart sounds: Normal heart sounds.   Pulmonary:      Effort: Pulmonary effort is normal.      Breath sounds: Normal breath sounds.   Musculoskeletal:      Cervical back: Normal range of motion and neck supple.   Lymphadenopathy:      Cervical: No cervical adenopathy.   Neurological:      Mental Status: She is alert.             Results  Laboratory Studies  A1c was 6.5%. In December 2024, A1c was 6.6%. Urine test showed protein level of 102.    Assessment & Plan:     1. Type 2 diabetes mellitus with diabetic microalbuminuria, without long-term current use of insulin ([facility])  POCT Hemoglobin A1C      2. Injury of right Achilles tendon, initial encounter        3. Primary hypertension            Assessment & Plan  1. Diabetes mellitus complicated by microalbuminuria: Chronic, well-managed. A1c 6.5% today, down from 6.6% in December. Microalbumin to creatinine ratio was 102 in 12/2024.  - Continue Mounjaro 5 mg every 7 days  - Continue metformin extended release 500 mg twice a day  - Monitor A1c levels every 3 months    2. Achilles tendon injury: Lump around Achilles tendon on the right side, likely due to irritation from new shoes. No associated pain.  - No intervention required as it is not causing pain or functional issues    4. Hypertension: Chronic, well-controlled. Blood pressure 118/72 mmHg.  - Continue chlorthalidone 25 mg once a day  - Continue lisinopril 10 mg a day    Return in about 3 months (around 6/30/2025) for f/u DM, get A1c.    Please note that this dictation was created using voice recognition software. I have made every reasonable attempt to correct obvious errors, but I expect that there are errors of grammar and possibly content that I did not discover before finalizing the note.

## 2025-04-08 DIAGNOSIS — E78.49 OTHER HYPERLIPIDEMIA: ICD-10-CM

## 2025-04-09 RX ORDER — ROSUVASTATIN CALCIUM 40 MG/1
40 TABLET, COATED ORAL DAILY
Qty: 90 TABLET | Refills: 3 | Status: SHIPPED | OUTPATIENT
Start: 2025-04-09

## 2025-04-24 RX ORDER — LEVOTHYROXINE SODIUM 112 UG/1
TABLET ORAL
Qty: 90 TABLET | Refills: 2 | Status: SHIPPED | OUTPATIENT
Start: 2025-04-24

## 2025-04-24 NOTE — TELEPHONE ENCOUNTER
Received request via: Patient    Was the patient seen in the last year in this department? Yes    Does the patient have an active prescription (recently filled or refills available) for medication(s) requested? No    Pharmacy Name: [facility] [address] [place] [address], [place] - [address]      Does the patient have custodial Plus and need 100-day supply? (This applies to ALL medications) Patient does not have SCP

## 2025-05-29 RX ORDER — FLUTICASONE PROPIONATE 50 MCG
1 SPRAY, SUSPENSION (ML) NASAL DAILY
Qty: 16 G | Refills: 0 | Status: SHIPPED | OUTPATIENT
Start: 2025-05-29

## 2025-05-29 NOTE — TELEPHONE ENCOUNTER
Received request via: Pharmacy    Was the patient seen in the last year in this department? Yes    Does the patient have an active prescription (recently filled or refills available) for medication(s) requested? No    Pharmacy Name:     Does the patient have snf Plus and need 100-day supply? (This applies to ALL medications) Patient does not have SCP

## 2025-06-18 ENCOUNTER — RESULTS FOLLOW-UP (OUTPATIENT)
Dept: MEDICAL GROUP | Facility: PHYSICIAN GROUP | Age: 70
End: 2025-06-18
Payer: COMMERCIAL

## 2025-06-18 ENCOUNTER — HOSPITAL ENCOUNTER (OUTPATIENT)
Dept: LAB | Facility: MEDICAL CENTER | Age: 70
End: 2025-06-18
Attending: FAMILY MEDICINE
Payer: COMMERCIAL

## 2025-06-18 DIAGNOSIS — M81.0 AGE-RELATED OSTEOPOROSIS WITHOUT CURRENT PATHOLOGICAL FRACTURE: ICD-10-CM

## 2025-06-18 LAB
CA-I SERPL-SCNC: 1.3 MMOL/L (ref 1.1–1.3)
CALCIUM SERPL-MCNC: 9.9 MG/DL (ref 8.5–10.5)

## 2025-06-18 PROCEDURE — 82310 ASSAY OF CALCIUM: CPT

## 2025-06-18 PROCEDURE — 36415 COLL VENOUS BLD VENIPUNCTURE: CPT

## 2025-06-18 PROCEDURE — 82330 ASSAY OF CALCIUM: CPT

## 2025-07-07 ENCOUNTER — OFFICE VISIT (OUTPATIENT)
Dept: MEDICAL GROUP | Facility: PHYSICIAN GROUP | Age: 70
End: 2025-07-07
Payer: COMMERCIAL

## 2025-07-07 ENCOUNTER — RESULTS FOLLOW-UP (OUTPATIENT)
Dept: MEDICAL GROUP | Facility: PHYSICIAN GROUP | Age: 70
End: 2025-07-07

## 2025-07-07 VITALS
HEART RATE: 76 BPM | WEIGHT: 156 LBS | BODY MASS INDEX: 25.07 KG/M2 | TEMPERATURE: 97.7 F | DIASTOLIC BLOOD PRESSURE: 72 MMHG | SYSTOLIC BLOOD PRESSURE: 120 MMHG | HEIGHT: 66 IN

## 2025-07-07 DIAGNOSIS — E11.29 TYPE 2 DIABETES MELLITUS WITH DIABETIC MICROALBUMINURIA, WITHOUT LONG-TERM CURRENT USE OF INSULIN (HCC): Primary | ICD-10-CM

## 2025-07-07 DIAGNOSIS — I10 PRIMARY HYPERTENSION: ICD-10-CM

## 2025-07-07 DIAGNOSIS — R80.9 TYPE 2 DIABETES MELLITUS WITH DIABETIC MICROALBUMINURIA, WITHOUT LONG-TERM CURRENT USE OF INSULIN (HCC): Primary | ICD-10-CM

## 2025-07-07 DIAGNOSIS — J30.2 SEASONAL ALLERGIES: ICD-10-CM

## 2025-07-07 PROBLEM — R05.9 COUGH: Status: RESOLVED | Noted: 2022-08-04 | Resolved: 2025-07-07

## 2025-07-07 LAB
HBA1C MFR BLD: 6.5 % (ref ?–5.8)
POCT INT CON NEG: NEGATIVE
POCT INT CON POS: POSITIVE

## 2025-07-07 PROCEDURE — 3078F DIAST BP <80 MM HG: CPT | Performed by: FAMILY MEDICINE

## 2025-07-07 PROCEDURE — 99214 OFFICE O/P EST MOD 30 MIN: CPT | Performed by: FAMILY MEDICINE

## 2025-07-07 PROCEDURE — 3074F SYST BP LT 130 MM HG: CPT | Performed by: FAMILY MEDICINE

## 2025-07-07 PROCEDURE — 83036 HEMOGLOBIN GLYCOSYLATED A1C: CPT | Performed by: FAMILY MEDICINE

## 2025-07-07 RX ORDER — CHLORTHALIDONE 25 MG/1
25 TABLET ORAL DAILY
Qty: 90 TABLET | Refills: 3 | Status: SHIPPED | OUTPATIENT
Start: 2025-07-07

## 2025-07-07 ASSESSMENT — FIBROSIS 4 INDEX: FIB4 SCORE: 1.07

## 2025-07-07 NOTE — PROGRESS NOTES
"Verbal consent was acquired by the patient to use [employer] ambient listening note generation during this visit     Subjective:     HPI:   History of Present Illness  The patient presents for evaluation of diabetes, hypertension, and allergies.    Diabetes  - She is currently on a regimen of Mounjaro 5 mg every 7 days and metformin 500 mg twice daily.    Hypertension  - Her blood pressure is well-controlled with chlorthalidone 25 mg once daily and lisinopril 10 mg once daily.    Allergies  - She continues to experience a cough at night, which she attributes to allergies.  - The cough is intermittent and is managed with medication.  - She also reports a sensation of drainage down the back of her throat.  - She uses Flonase as needed for symptom relief.    Supplemental information: Her last eye examination was conducted in 07/2024 at [facility], where she was informed that her eyes were in excellent condition. She has not experienced any chest pain, breathing difficulties, fevers, or chills in recent days.    Health Maintenance: Completed    Objective:     Exam:  /72 (BP Location: Left arm, Patient Position: Sitting, BP Cuff Size: Adult)   Pulse 76   Temp 36.5 °C (97.7 °F) (Temporal)   Ht 1.676 m (5' 6\")   Wt 70.8 kg (156 lb)   BMI 25.18 kg/m²  Body mass index is 25.18 kg/m².    Physical Exam  Constitutional:       Appearance: Normal appearance.   Cardiovascular:      Rate and Rhythm: Normal rate and regular rhythm.      Heart sounds: Normal heart sounds.   Pulmonary:      Effort: Pulmonary effort is normal.      Breath sounds: Normal breath sounds.   Musculoskeletal:      Cervical back: Normal range of motion and neck supple.   Lymphadenopathy:      Cervical: No cervical adenopathy.   Neurological:      Mental Status: She is alert.             Results      Assessment & Plan:     1. Type 2 diabetes mellitus with diabetic microalbuminuria, without long-term current use of insulin ([facility])  POCT A1C "    Diabetic Monofilament LE Exam    Comp Metabolic Panel      2. Primary hypertension        3. Seasonal allergies            Assessment & Plan  1. Diabetes mellitus: Chronic, controlled.  Hemoglobin A1c is under 7.0%.  She is up-to-date with all diabetic screenings, on an ACE inhibitor for renal protection, and is on a statin for cardiovascular protection.  We will continue the current regimen.  - Treatment plan: She is currently on Mounjaro 5 mg every 7 days and metformin 500 mg twice a day.     2. Hypertension: Her blood pressure is well-controlled at 120/72 mmHg. She is currently on chlorthalidone 25 mg once a day and lisinopril 10 mg once a day.   - Treatment plan: Both medications are effective, with refills available. A refill for chlorthalidone will be sent to the pharmacy.     3. Allergies: Chronic, unstable. She reports a nighttime cough likely due to allergies, with postnasal drip. Physical exam findings include clear lung sounds and no enlarged lymph nodes. She uses Flonase as needed but is advised to use it daily before bed and can increase to twice a day if symptoms worsen.   - Treatment plan: Counseling provided on the use of Flonase and management of allergy symptoms.     4. Health maintenance: She will sign a record release for [facility] to obtain the last colonoscopy report. The [employer] was discussed, and she will consider participation. No immediate concerns or additional symptoms reported.   - Treatment plan: Follow-up visit scheduled in 3 months for diabetes management.      Return in about 3 months (around 10/7/2025) for f/u DM, get A1c.    Please note that this dictation was created using voice recognition software. I have made every reasonable attempt to correct obvious errors, but I expect that there are errors of grammar and possibly content that I did not discover before finalizing the note.

## 2025-07-07 NOTE — LETTER
[facility]  Arlin Cota M.D.  [address]  [place] [address]  Fax: [phone]   Authorization for Release/Disclosure of   Protected Health Information   Name: RADHA MCQUEEN : 1955 SSN: [id number]   Address: [facility] [address]   [place] [address] Phone:    [phone] (home)    I authorize the entity listed below to release/disclose the PHI below to:   [facility]/Arlin Cota M.D. and Arlin Cota M.D.   Provider or Entity Name:  [facility]   Address   City, State, Zip   Phone:      Fax:     Reason for request: continuity of care   Information to be released:    [XX] LAST COLONOSCOPY,  including any PATH REPORT and follow-up  [  ] LAST FIT/COLOGUARD RESULT [  ] LAST DEXA  [  ] LAST MAMMOGRAM  [  ] LAST PAP  [  ] LAST LABS [  ] RETINA EXAM REPORT  [  ] IMMUNIZATION RECORDS  [  ] Release all info      [  ] Check here and initial the line next to each item to release ALL health information INCLUDING  _____ Care and treatment for drug and / or alcohol abuse  _____ HIV testing, infection status, or AIDS  _____ Genetic Testing    DATES OF SERVICE OR TIME PERIOD TO BE DISCLOSED: _____________  I understand and acknowledge that:  * This Authorization may be revoked at any time by you in writing, except if your health information has already been used or disclosed.  * Your health information that will be used or disclosed as a result of you signing this authorization could be re-disclosed by the recipient. If this occurs, your re-disclosed health information may no longer be protected by State or Federal laws.  * You may refuse to sign this Authorization. Your refusal will not affect your ability to obtain treatment.  * This Authorization becomes effective upon signing and will  on (date) __________.      If no date is indicated, this Authorization will  one (1) year from the signature date.    Name: Radha Mcqueen  Signature: Date:   2025      PLEASE FAX REQUESTED RECORDS BACK TO: [phone]

## 2025-07-07 NOTE — LETTER
[facility]  Arlin Cota M.D.  [address]  [place] [address]  Fax: [phone]   Authorization for Release/Disclosure of   Protected Health Information   Name: RADHA MCQUEEN : 1955 SSN: [id number]   Address: [facility] [place] [address] Phone:    [phone] (home)    I authorize the entity listed below to release/disclose the PHI below to:   [facility]/Arlin Cota M.D. and Arlin Cota M.D.   Provider or Entity Name:  [facility]   Address   City, State, Zip   Phone:      Fax:     Reason for request: continuity of care   Information to be released:    [  ] LAST COLONOSCOPY,  including any PATH REPORT and follow-up  [  ] LAST FIT/COLOGUARD RESULT [  ] LAST DEXA  [  ] LAST MAMMOGRAM  [  ] LAST PAP  [  ] LAST LABS [XX] RETINA EXAM REPORT  [  ] IMMUNIZATION RECORDS  [  ] Release all info      [  ] Check here and initial the line next to each item to release ALL health information INCLUDING  _____ Care and treatment for drug and / or alcohol abuse  _____ HIV testing, infection status, or AIDS  _____ Genetic Testing    DATES OF SERVICE OR TIME PERIOD TO BE DISCLOSED: _____________  I understand and acknowledge that:  * This Authorization may be revoked at any time by you in writing, except if your health information has already been used or disclosed.  * Your health information that will be used or disclosed as a result of you signing this authorization could be re-disclosed by the recipient. If this occurs, your re-disclosed health information may no longer be protected by State or Federal laws.  * You may refuse to sign this Authorization. Your refusal will not affect your ability to obtain treatment.  * This Authorization becomes effective upon signing and will  on (date) __________.      If no date is indicated, this Authorization will  one (1) year from the signature date.    Name: Radha Mcqueen  Signature: Date:   2025      PLEASE FAX REQUESTED RECORDS BACK TO: [phone]

## 2025-07-08 RX ORDER — METFORMIN HYDROCHLORIDE 500 MG/1
500 TABLET, EXTENDED RELEASE ORAL 2 TIMES DAILY
Qty: 180 TABLET | Refills: 1 | Status: SHIPPED | OUTPATIENT
Start: 2025-07-08